# Patient Record
Sex: FEMALE | Race: WHITE | NOT HISPANIC OR LATINO | Employment: UNEMPLOYED | URBAN - METROPOLITAN AREA
[De-identification: names, ages, dates, MRNs, and addresses within clinical notes are randomized per-mention and may not be internally consistent; named-entity substitution may affect disease eponyms.]

---

## 2017-01-02 ENCOUNTER — OFFICE VISIT (OUTPATIENT)
Dept: FAMILY MEDICINE | Facility: CLINIC | Age: 57
End: 2017-01-02
Payer: COMMERCIAL

## 2017-01-02 VITALS
BODY MASS INDEX: 46.98 KG/M2 | DIASTOLIC BLOOD PRESSURE: 80 MMHG | SYSTOLIC BLOOD PRESSURE: 122 MMHG | HEART RATE: 73 BPM | OXYGEN SATURATION: 96 % | TEMPERATURE: 96.7 F | RESPIRATION RATE: 16 BRPM | HEIGHT: 65 IN | WEIGHT: 282 LBS

## 2017-01-02 DIAGNOSIS — E11.9 TYPE 2 DIABETES MELLITUS WITHOUT COMPLICATION, WITHOUT LONG-TERM CURRENT USE OF INSULIN (H): ICD-10-CM

## 2017-01-02 DIAGNOSIS — E66.01 MORBID OBESITY DUE TO EXCESS CALORIES (H): ICD-10-CM

## 2017-01-02 DIAGNOSIS — R10.13 ABDOMINAL PAIN, EPIGASTRIC: Primary | ICD-10-CM

## 2017-01-02 DIAGNOSIS — K21.9 GASTROESOPHAGEAL REFLUX DISEASE, ESOPHAGITIS PRESENCE NOT SPECIFIED: ICD-10-CM

## 2017-01-02 PROCEDURE — 99213 OFFICE O/P EST LOW 20 MIN: CPT | Performed by: NURSE PRACTITIONER

## 2017-01-02 RX ORDER — LANSOPRAZOLE 30 MG/1
CAPSULE, DELAYED RELEASE ORAL
COMMUNITY
Start: 2016-03-20 | End: 2017-01-02

## 2017-01-02 RX ORDER — POTASSIUM CHLORIDE 1500 MG/1
TABLET, EXTENDED RELEASE ORAL
COMMUNITY
Start: 2012-09-15 | End: 2017-01-18

## 2017-01-02 ASSESSMENT — PAIN SCALES - GENERAL: PAINLEVEL: MODERATE PAIN (5)

## 2017-01-02 NOTE — NURSING NOTE
"Chief Complaint   Patient presents with     Abdominal Pain     gas, bloating and burping       Initial /80 mmHg  Pulse 73  Temp(Src) 96.7  F (35.9  C) (Oral)  Resp 16  Ht 5' 5.25\" (1.657 m)  Wt 282 lb (127.914 kg)  BMI 46.59 kg/m2  SpO2 96%  LMP 02/01/2010  Breastfeeding? No Estimated body mass index is 46.59 kg/(m^2) as calculated from the following:    Height as of this encounter: 5' 5.25\" (1.657 m).    Weight as of this encounter: 282 lb (127.914 kg).  BP completed using cuff size: destin Mariscal CMA      "

## 2017-01-02 NOTE — PATIENT INSTRUCTIONS
[facility]    If you have any questions regarding to your visit please contact your care team:     Team Pink:   Clinic Hours Telephone Number   Internal Medicine:  Dr. Octavia Monroe NP       7am-7pm  Monday - Thursday   7am-5pm  Fridays  [phone]  (Appointment scheduling available 24/7)    Questions about your visit?  Team Line  [phone]   Urgent Care - [place] Gomez and [facility] - 11am-9pm Monday-Friday Saturday-Sunday- 9am-5pm   [place] - 5pm-9pm Monday-Friday Saturday-Sunday- 9am-5pm  [phone] - [place]   [phone] - [place]       What options do I have for visits at the clinic other than the traditional office visit?  To expand how we care for you, many of our providers are utilizing electronic visits (e-visits) and telephone visits, when medically appropriate, for interactions with their patients rather than a visit in the clinic.   We also offer nurse visits for many medical concerns. Just like any other service, we will bill your insurance company for this type of visit based on time spent on the phone with your provider. Not all insurance companies cover these visits. Please check with your medical insurance if this type of visit is covered. You will be responsible for any charges that are not paid by your insurance.      E-visits via [email or web address]:  generally incur a $35.00 fee.  Telephone visits:  Time spent on the phone: *charged based on time that is spent on the phone in increments of 10 minutes. Estimated cost:   5-10 mins $30.00   11-20 mins. $59.00   21-30 mins. $85.00   Use The Bakeryt (secure email communication and access to your chart) to send your primary care provider a message or make an appointment. Ask someone on your Team how to sign up for [email or web address].    For a Price Quote for your services, please call our Consumer Price Line at [phone].    As always, Thank you for trusting us with your health care  needs!    BRIT

## 2017-01-02 NOTE — PROGRESS NOTES
SUBJECTIVE:                                                    Gladys Singh is a 56 year old female who presents to clinic today for the following health issues:    Abdominal Pain      Duration: 2 months    Description (location/character/radiation): upper gastric       Associated flank pain: None    Intensity:  moderate    Accompanying signs and symptoms:        Fever/Chills: no        Gas/Bloating: YES       Nausea/vomitting: YES       Diarrhea: NO       Dysuria or Hematuria: no     History (previous similar pain/trauma/previous testing): hiatal hernia    Precipitating or alleviating factors:       Pain worse with eating/BM/urination: eating       Pain relieved by BM: no     Therapies tried and outcome: prevacid, zantac and prilosec    LMP:  not applicable     Patient denies melena, hematochezia.  Patient denies weight loss.  She has loose stools.  She has noticed some improvement with zantac, but wonders if she can take it more frequently during the day.  Patient is a vegan and mostly avoid acidic foods.  She does drink 2 cups of coffee daily.  She last had EGD 5 years ago and has a known hiatal hernia.  Patient had normal ultrasound in 10/16.    Problem list and histories reviewed & adjusted, as indicated.  Additional history: as documented    Patient Active Problem List   Diagnosis     GERD (gastroesophageal reflux disease)     Pedal edema     Major depressive disorder, recurrent episode, severe (H)     Fatigue     Hypothyroidism     Anxiety     Vegetarian     KHUSHI (obstructive sleep apnea)     Hyperparathyroidism (H)     Iron deficiency     Hyperlipidemia LDL goal <100     Papanicolaou smear of cervix with atypical squamous cells cannot exclude high grade squamous intraepithelial lesion (ASC-H)     Constipation, chronic     Hiatal hernia     Thumb pain     New daily persistent headache     Left knee pain     Morbid obesity due to excess calories (H)     Iliotibial band tendinitis of left side      Essential hypertension     Type 2 diabetes mellitus without complication, without long-term current use of insulin (H)     Past Surgical History   Procedure Laterality Date     Arthroscopy knee rt/lt       Tonsillectomy & adenoidectomy       Appendectomy       Hernia repair, umbilical       Hc repair of nasal septum       Pe tubes       Colonoscopy  , ,      early Crohn's     Hc esophagoscopy, diagnostic  , ,      benign, fungal infection     C total knee arthroplasty  2004     bilateral     C  delivery only       x 2     Hc colp cervix/upper vagina w bx cervix  ,      neg       Social History   Substance Use Topics     Smoking status: Former Smoker     Quit date: 1994     Smokeless tobacco: Never Used     Alcohol Use: No     Family History   Problem Relation Age of Onset     DIABETES Mother      CANCER Mother      endometrial     DIABETES Father      Prostate Cancer Father      DIABETES Maternal Grandmother      CEREBROVASCULAR DISEASE Paternal Grandfather      Cancer - colorectal Sister      Neurologic Disorder Daughter      anxiety     Neurologic Disorder Daughter      anxiety, bipolar     C.A.D. Father          Current Outpatient Prescriptions   Medication Sig Dispense Refill     potassium chloride SA (K-DUR/KLOR-CON M) 20 MEQ CR tablet        ranitidine (ZANTAC) 150 MG tablet Take 1 tablet (150 mg) by mouth 2 times daily 60 tablet 3     levothyroxine (SYNTHROID/LEVOTHROID) 75 MCG tablet Take 1 tablet (75 mcg) by mouth daily 90 tablet 3     vitamin D (ERGOCALCIFEROL) 42233 UNIT capsule TAKE 1 CAPSULE BY MOUTH ONCE WEEKLY. 8 capsule 3     blood glucose monitoring (NO BRAND SPECIFIED) meter device kit Use to test blood sugar one times daily or as directed. 1 kit 0     blood glucose (NO BRAND SPECIFIED) lancets standard Use to test blood sugar one times daily or as directed. 1 Box 0     blood glucose monitoring (NO BRAND SPECIFIED) test strip Use to test blood sugars  one times daily or as directed 100 strip 3     lisinopril (PRINIVIL,ZESTRIL) 10 MG tablet TAKE 1 TABLET (10 MG) BY MOUTH DAILY 90 tablet 1     docusate sodium (COLACE) 100 MG capsule TAKE 1 CAPSULE (100 MG) BY MOUTH 2 TIMES DAILY 180 capsule 1     Oyster Shell Calcium (CALCIUM CARBONATE) 500 MG tablet TAKE 1 TABLET BY MOUTH. THREE TIMES DAILY. 270 tablet 1     Ferrous Sulfate (CVS SLOW RELEASE IRON) 143 (45 FE) MG TBCR Take 1 tablet by mouth 2 times daily 180 tablet 1     furosemide (LASIX) 40 MG tablet TAKE 1 TABLET (40 MG) BY MOUTH DAILY 90 tablet 1     omeprazole (PRILOSEC) 20 MG capsule Take 1 capsule (20 mg) by mouth 2 times daily 180 capsule 0     simvastatin (ZOCOR) 20 MG tablet TAKE 1 TABLET (20 MG) BY MOUTH AT BEDTIME 90 tablet 1     cyanocobalamin (VITAMIN  B-12) 1000 MCG tablet TAKE 1 TABLET (1,000 MCG) BY MOUTH DAILY 90 tablet 1     aspirin 81 MG EC tablet TAKE 1 TABLET (81 MG) BY MOUTH DAILY 100 tablet 1     insulin pen needle (B-D U/F) 31G X 5 MM Use once daily or as directed. 100 each prn     order for DME Test strips for pt's glucometer, brand as covered by insurance. Test twice daily or PRN. 1 Box prn     Multiple Vitamins-Minerals (CENTROVITE) TABS TAKE 1 TABLET BY MOUTH DAILY 90 tablet 3     POTASSIUM CHLORIDE 20 MEQ tablet TAKE 1 TABLET (20 MEQ) BY MOUTH 2 TIMES DAILY 180 tablet 1     FLUoxetine (PROZAC) 20 MG capsule Take 40 mg by mouth daily        LORazepam (ATIVAN) 1 MG tablet Take 1 mg by mouth At Bedtime        modafinil (PROVIGIL) 200 MG tablet Take 0.5 tablets by mouth daily.       AMBIEN 10 MG OR TABS 1 TABLET DAILY AT BEDTIME       Allergies   Allergen Reactions     Augmentin GI Disturbance     Victoza Other (See Comments)     Abdominal pain     BP Readings from Last 3 Encounters:   01/02/17 122/80   12/19/16 132/82   11/21/16 130/80    Wt Readings from Last 3 Encounters:   01/02/17 282 lb (127.914 kg)   12/19/16 280 lb 3.2 oz (127.098 kg)   11/21/16 281 lb (127.461 kg)                 "  Problem list, Medication list, Allergies, and Medical/Social/Surgical histories reviewed in [facility] and updated as appropriate.    ROS:  Constitutional, HEENT, cardiovascular, pulmonary, gi and gu systems are negative, except as otherwise noted.    OBJECTIVE:                                                    /80 mmHg  Pulse 73  Temp(Src) 96.7  F (35.9  C) (Oral)  Resp 16  Ht 5' 5.25\" (1.657 m)  Wt 282 lb (127.914 kg)  BMI 46.59 kg/m2  SpO2 96%  LMP 02/01/2010  Breastfeeding? No  Body mass index is 46.59 kg/(m^2).  GENERAL: healthy, alert and no distress  RESP: lungs clear to auscultation - no rales, rhonchi or wheezes  CV: regular rate and rhythm, normal S1 S2, no S3 or S4, no murmur, click or rub, no peripheral edema and peripheral pulses strong  ABDOMEN: soft, nontender, no hepatosplenomegaly, no masses and bowel sounds normal  MS: no gross musculoskeletal defects noted, no edema    Diagnostic Test Results:  none      ASSESSMENT/PLAN:                                                      1. Abdominal pain, epigastric  Will refer to GI for further evaluation- possibly esophagitis, hiatal hernia, uncontrolled GERD.  - GASTROENTEROLOGY ADULT REFERRAL +/- PROCEDURE    2. Morbid obesity due to excess calories (H)  I suspect that weight loss will help with above issues and with diabetes as well.  Patient feels her diet is currently well controlled, but she is unable to exercise due to chronic pain.  Patient was recently taken off her meds due to potential side effects and she is concerned about her blood sugars.  They range from 130-140 fasting and patient will follow-up with endocrinology.  - [facility] PT, HAND, AND CHIROPRACTIC REFERRAL    3. Type 2 diabetes mellitus without complication, without long-term current use of insulin (H)  As above.   - [facility] PT, HAND, AND CHIROPRACTIC REFERRAL    4. Gastroesophageal reflux disease, esophagitis presence not specified  Will try switching to 150 mg BID, in addition to " omeprazole 20 mg BID to see if patient can get increased relief of symptoms.  - ranitidine (ZANTAC) 150 MG tablet; Take 1 tablet (150 mg) by mouth 2 times daily  Dispense: 60 tablet; Refill: 3  - GASTROENTEROLOGY ADULT REFERRAL +/- PROCEDURE    FUTURE APPOINTMENTS:       - Follow-up for annual visit or as needed    JACKELINE Hare CNP  [facility]

## 2017-01-02 NOTE — MR AVS SNAPSHOT
After Visit Summary   1/2/2017    Gladys Singh    MRN: [id number]           Patient Information     Date Of Birth          1960        Visit Information        Provider Department      1/2/2017 9:00 AM Vania Monroe APRN [facility]        Today's Diagnoses     Abdominal pain, epigastric    -  1     Morbid obesity due to excess calories (H)         Type 2 diabetes mellitus without complication, without long-term current use of insulin (H)         Gastroesophageal reflux disease, esophagitis presence not specified           Care Instructions    [facility]    If you have any questions regarding to your visit please contact your care team:     Team Pink:   Clinic Hours Telephone Number   Internal Medicine:  Dr. Octavia Monroe, NP       7am-7pm  Monday - Thursday   7am-5pm  Fridays  [phone]  (Appointment scheduling available 24/7)    Questions about your visit?  Team Line  [phone]   Urgent Care - Michelle Gomez and [facility] - 11am-9pm Monday-Friday Saturday-Sunday- 9am-5pm   [place] - 5pm-9pm Monday-Friday Saturday-Sunday- 9am-5pm  [phone] - Michelle   [phone] - [place]       What options do I have for visits at the clinic other than the traditional office visit?  To expand how we care for you, many of our providers are utilizing electronic visits (e-visits) and telephone visits, when medically appropriate, for interactions with their patients rather than a visit in the clinic.   We also offer nurse visits for many medical concerns. Just like any other service, we will bill your insurance company for this type of visit based on time spent on the phone with your provider. Not all insurance companies cover these visits. Please check with your medical insurance if this type of visit is covered. You will be responsible for any charges that are not paid by your insurance.       E-visits via Touch[employer]2 Technologieshart:  generally incur a $35.00 fee.  Telephone visits:  Time spent on the phone: *charged based on time that is spent on the phone in increments of 10 minutes. Estimated cost:   5-10 mins $30.00   11-20 mins. $59.00   21-30 mins. $85.00   Use Touch[employer]hart (secure email communication and access to your chart) to send your primary care provider a message or make an appointment. Ask someone on your Team how to sign up for [employer].    For a Price Quote for your services, please call our xkoto Price Line at [phone].    As always, Thank you for trusting us with your health care needs!    SERJIO/MA          Follow-ups after your visit        Additional Services     GASTROENTEROLOGY ADULT REFERRAL +/- PROCEDURE       Your provider has referred you to Gastroenterology Services.    English    Procedure/Referral: REFERRAL ONLY - [facility]: [facility] [place] [phone]   [email or web address]    Please be aware that coverage of these services is subject to the terms and limitations of your health insurance plan.  Call member services at your health plan with any benefit or coverage questions.  Any procedures must be performed at a [place] facility OR coordinated by your clinic's referral office.    Please bring the following with you to your appointment:    (1) Any X-Rays, CTs or MRIs which have been performed.  Contact the facility where they were done to arrange for  prior to your scheduled appointment.    (2) List of current medications   (3) This referral request   (4) Any documents/labs given to you for this referral            [facility] PT, HAND, AND CHIROPRACTIC REFERRAL       **This order will print in the [facility] Scheduling Office**    Physical Therapy, Hand Therapy and Chiropractic Care are available through:    *[facility]  *[facility]  *[facility]    Call one number to schedule at any of the above locations: [phone].    Your provider has referred you to: Physical Therapy at [facility] or [facility]    Indication/Reason for Referral: Morbid obesity, chronic pain, diabetes  Onset of Illness:   Therapy Orders: Evaluate and Treat  Special Programs: Diabetes Focus Exercise and None  Special Request: None    Dario Bishop      Additional Comments for the Therapist or Chiropractor:     Please be aware that coverage of these services is subject to the terms and limitations of your health insurance plan.  Call member services at your health plan with any benefit or coverage questions.      Please bring the following to your appointment:    *Your personal calendar for scheduling future appointments  *Comfortable clothing                  Your next 10 appointments already scheduled     Jun 19, 2017 10:50 AM   Return Visit with Roseline Fajardo MD, [facility] ENDO NURSE   [facility]    [id number] [place] [id number] [phone]              Who to contact     If you have questions or need follow up information about today's clinic visit or your schedule please contact [facility] directly at [phone].  Normal or non-critical lab and imaging results will be communicated to you by [employer]op[employer]art, letter or phone within 4 business days after the clinic has received the results. If you do not hear from us within 7 days, please contact the clinic through [employer] or phone. If you have a critical or abnormal lab result, we will notify you by phone as soon as possible.  Submit refill requests through Intentiva or call your pharmacy and they will forward the refill request to us. Please allow 3 business days for your refill to be completed.          Additional Information About Your Visit        [employer]op[employer]ar[employer]ival[employer] Information     [employer] gives you secure access to your electronic health record. If you see a primary care provider, you can also send messages to your care team and make  "appointments. If you have questions, please call your primary care clinic.  If you do not have a primary care provider, please call [phone] and they will assist you.        Your Vitals Were     Pulse Temperature Respirations    73 96.7  F (35.9  C) (Oral) 16    Height BMI (Body Mass Index) Pulse Oximetry    5' 5.25\" (1.657 m) 46.59 kg/m2 96%    Last Period Breastfeeding?       02/01/2010 No        Blood Pressure from Last 3 Encounters:   01/02/17 122/80   12/19/16 132/82   11/21/16 130/80    Weight from Last 3 Encounters:   01/02/17 282 lb (127.914 kg)   12/19/16 280 lb 3.2 oz (127.098 kg)   11/21/16 281 lb (127.461 kg)              We Performed the Following     GASTROENTEROLOGY ADULT REFERRAL +/- PROCEDURE     [facility] PT, HAND, AND CHIROPRACTIC REFERRAL          Today's Medication Changes          These changes are accurate as of: 1/2/17  9:41 AM.  If you have any questions, ask your nurse or doctor.               These medicines have changed or have updated prescriptions.        Dose/Directions    ranitidine 150 MG tablet   Commonly known as:  ZANTAC   This may have changed:    - medication strength  - how much to take  - when to take this   Used for:  Gastroesophageal reflux disease, esophagitis presence not specified   Changed by:  Vania Monroe APRN CNP        Dose:  150 mg   Take 1 tablet (150 mg) by mouth 2 times daily   Quantity:  60 tablet   Refills:  3            Where to get your medicines      These medications were sent to [facility]/pharmacy #[id number] - [place] - [address] [address]     Phone:  [phone]    - ranitidine 150 MG tablet             Primary Care Provider Office Phone # Fax #    JACKELINE [facility] [phone]       [address] [address]        Thank you!     Thank you for choosing [facility]  for your care. Our goal is always to provide you with " excellent care. Hearing back from our patients is one way we can continue to improve our services. Please take a few minutes to complete the written survey that you may receive in the mail after your visit with us. Thank you!             Your Updated Medication List - Protect others around you: Learn how to safely use, store and throw away your medicines at www.disposemymeds.org.          This list is accurate as of: 1/2/17  9:41 AM.  Always use your most recent med list.                   Brand Name Dispense Instructions for use    AMBIEN 10 MG tablet   Generic drug:  zolpidem      1 TABLET DAILY AT BEDTIME       aspirin 81 MG EC tablet     100 tablet    TAKE 1 TABLET (81 MG) BY MOUTH DAILY       blood glucose lancets standard    no brand specified    1 Box    Use to test blood sugar one times daily or as directed.       blood glucose monitoring meter device kit    no brand specified    1 kit    Use to test blood sugar one times daily or as directed.       blood glucose monitoring test strip    no brand specified    100 strip    Use to test blood sugars one times daily or as directed       CENTROVITE Tabs     90 tablet    TAKE 1 TABLET BY MOUTH DAILY       cyanocobalamin 1000 MCG tablet    vitamin  B-12    90 tablet    TAKE 1 TABLET (1,000 MCG) BY MOUTH DAILY       docusate sodium 100 MG capsule    COLACE    180 capsule    TAKE 1 CAPSULE (100 MG) BY MOUTH 2 TIMES DAILY       Ferrous Sulfate 143 (45 FE) MG Tbcr    CVS SLOW RELEASE IRON    180 tablet    Take 1 tablet by mouth 2 times daily       furosemide 40 MG tablet    LASIX    90 tablet    TAKE 1 TABLET (40 MG) BY MOUTH DAILY       insulin pen needle 31G X 5 MM    B-D U/F    100 each    Use once daily or as directed.       levothyroxine 75 MCG tablet    SYNTHROID/LEVOTHROID    90 tablet    Take 1 tablet (75 mcg) by mouth daily       lisinopril 10 MG tablet    PRINIVIL/ZESTRIL    90 tablet    TAKE 1 TABLET (10 MG) BY MOUTH DAILY       LORazepam 1 MG tablet     ATIVAN     Take 1 mg by mouth At Bedtime       omeprazole 20 MG CR capsule    priLOSEC    180 capsule    Take 1 capsule (20 mg) by mouth 2 times daily       order for DME     1 Box    Test strips for pt's glucometer, brand as covered by insurance. Test twice daily or PRN.       Oyster Shell Calcium 500 MG tablet    calcium carbonate    270 tablet    TAKE 1 TABLET BY MOUTH. THREE TIMES DAILY.       * potassium chloride SA 20 MEQ CR tablet    K-DUR/KLOR-CON M         * potassium chloride 20 MEQ CR tablet   Generic drug:  potassium chloride SA     180 tablet    TAKE 1 TABLET (20 MEQ) BY MOUTH 2 TIMES DAILY       PROVIGIL 200 MG tablet   Generic drug:  modafinil      Take 0.5 tablets by mouth daily.       PROZAC 20 MG capsule   Generic drug:  FLUoxetine      Take 40 mg by mouth daily       ranitidine 150 MG tablet    ZANTAC    60 tablet    Take 1 tablet (150 mg) by mouth 2 times daily       simvastatin 20 MG tablet    ZOCOR    90 tablet    TAKE 1 TABLET (20 MG) BY MOUTH AT BEDTIME       vitamin D 53327 UNIT capsule    ERGOCALCIFEROL    8 capsule    TAKE 1 CAPSULE BY MOUTH ONCE WEEKLY.       * Notice:  This list has 2 medication(s) that are the same as other medications prescribed for you. Read the directions carefully, and ask your doctor or other care provider to review them with you.

## 2017-01-03 ENCOUNTER — TELEPHONE (OUTPATIENT)
Dept: ENDOCRINOLOGY | Facility: CLINIC | Age: 57
End: 2017-01-03

## 2017-01-03 NOTE — TELEPHONE ENCOUNTER
Pt faxed in bg log sheet on Monday. I received it after Dr Fajardo had left for the day. Called pt on Tues. Let her know we received bg log sheet but will not be able to give to Dr Fajardo until next Mon, when he is due back in clinic. Bg readings only slightlylty elevated with several in target range. Is she okay with that? Pt verbalized that would be fine. Advised once bg log is reviewed by Dr Fajardo, I will call her with is recomendations.Pt verbalized understading.     Elizabeth Lujan, RN, BSN, CDE   [facility]

## 2017-01-10 ENCOUNTER — TELEPHONE (OUTPATIENT)
Dept: ENDOCRINOLOGY | Facility: CLINIC | Age: 57
End: 2017-01-10

## 2017-01-10 NOTE — TELEPHONE ENCOUNTER
Left vm advising pt, bg log sheet ws reviewed by Dr Fajardo. Per his recommendatioin: okay to remain off all diabetes medication. Please send in more readings in 2 months. Reminded of appt with Dr Fajardo in June, 2017.     Elizabeth Lujan, RN, BSN, CDE   [facility]

## 2017-01-16 DIAGNOSIS — R35.89 DIURESIS: Primary | ICD-10-CM

## 2017-01-16 NOTE — TELEPHONE ENCOUNTER
Potassium       Last Written Prescription Date: 3/4/16  Last Fill Quantity: 180, # refills: 1  Last Office Visit with [facility] or [facility] prescribing provider: 01/02/2017       POTASSIUM   Date Value Ref Range Status   11/21/2016 3.5 3.4 - 5.3 mmol/L Final     CREATININE   Date Value Ref Range Status   11/21/2016 0.81 0.52 - 1.04 mg/dL Final     BP Readings from Last 3 Encounters:   01/02/17 122/80   12/19/16 132/82   11/21/16 130/80

## 2017-01-18 RX ORDER — POTASSIUM CHLORIDE 1500 MG/1
TABLET, EXTENDED RELEASE ORAL
Qty: 180 TABLET | Refills: 0 | Status: SHIPPED | OUTPATIENT
Start: 2017-01-18 | End: 2017-04-15

## 2017-01-18 NOTE — TELEPHONE ENCOUNTER
Prescription approved per [facility] Refill Protocol.  Re Resendiz RN

## 2017-01-19 DIAGNOSIS — K44.9 HIATAL HERNIA: Primary | ICD-10-CM

## 2017-01-19 NOTE — TELEPHONE ENCOUNTER
omeprazole (PRILOSEC) 20 MG capsule     Last Written Prescription Date: 10-27-16  Last Fill Quantity: 180,  # refills: 0   Last Office Visit with [facility], [facility] or [facility] prescribing provider: 1-2-17

## 2017-01-19 NOTE — TELEPHONE ENCOUNTER
Reason for call: Medication   If this is a refill request, has the caller requested the refill from the pharmacy already? Yes  Will the patient be using a [facility]? No  Name of the pharmacy and phone number for the current request: [facility]/PHARMACY #[id number] - [place], [place] - [address]    Name of the medication requested: Omeprazole 20 mg capsule; 1 by mouth 2x daily; qty 180    Other request: send electroniclally    Phone Number Pt can be reached at: Other phone number:  [phone]  Best Time: anytime  Can we leave a detailed message on this number? YES

## 2017-01-20 ENCOUNTER — TRANSFERRED RECORDS (OUTPATIENT)
Dept: HEALTH INFORMATION MANAGEMENT | Facility: CLINIC | Age: 57
End: 2017-01-20

## 2017-01-30 ENCOUNTER — OFFICE VISIT (OUTPATIENT)
Dept: FAMILY MEDICINE | Facility: CLINIC | Age: 57
End: 2017-01-30
Payer: COMMERCIAL

## 2017-01-30 VITALS
TEMPERATURE: 97.8 F | BODY MASS INDEX: 45.89 KG/M2 | HEART RATE: 65 BPM | DIASTOLIC BLOOD PRESSURE: 80 MMHG | WEIGHT: 277.8 LBS | SYSTOLIC BLOOD PRESSURE: 141 MMHG | OXYGEN SATURATION: 95 %

## 2017-01-30 DIAGNOSIS — J01.90 ACUTE SINUSITIS WITH SYMPTOMS > 10 DAYS: Primary | ICD-10-CM

## 2017-01-30 PROCEDURE — 99213 OFFICE O/P EST LOW 20 MIN: CPT | Performed by: PHYSICIAN ASSISTANT

## 2017-01-30 RX ORDER — DOXYCYCLINE 100 MG/1
100 CAPSULE ORAL 2 TIMES DAILY
Qty: 20 CAPSULE | Refills: 0 | Status: SHIPPED | OUTPATIENT
Start: 2017-01-30 | End: 2017-02-09

## 2017-01-30 NOTE — PATIENT INSTRUCTIONS
Doxycycline 100 mg twice a day for 10 days   Increase fluids  Nasal wash  Mucinex DM  F/u if not better after the antibiotic course.       Sinusitis (Antibiotic Treatment)    The sinuses are air-filled spaces within the bones of the face. They connect to the inside of the nose. Sinusitis is an inflammation of the tissue lining the sinus cavity. Sinus inflammation can occur during a cold. It can also be due to allergies to pollens and other particles in the air. Sinusitis can cause symptoms of sinus congestion and fullness. A sinus infection causes fever, headache and facial pain. There is often green or yellow drainage from the nose or into the back of the throat (post-nasal drip). You have been given antibiotics to treat this condition.  Home care:    Take the full course of antibiotics as instructed. Do not stop taking them, even if you feel better.    Drink plenty of water, hot tea, and other liquids. This may help thin mucus. It also may promote sinus drainage.    Heat may help soothe painful areas of the face. Use a towel soaked in hot water. Or,  the shower and direct the hot spray onto your face. Using a vaporizer along with a menthol rub at night may also help.     An expectorant containing guaifenesin may help thin the mucus and promote drainage from the sinuses.    Over-the-counter decongestants may be used unless a similar medicine was prescribed. Nasal sprays work the fastest. Use one that contains phenylephrine or oxymetazoline. First blow the nose gently. Then use the spray. Do not use these medicines more often than directed on the label or symptoms may get worse. You may also use tablets containing pseudoephedrine. Avoid products that combine ingredients, because side effects may be increased. Read labels. You can also ask the pharmacist for help. (NOTE: Persons with high blood pressure should not use decongestants. They can raise blood pressure.)    Over-the-counter antihistamines may help  if allergies contributed to your sinusitis.      Do not use nasal rinses or irrigation during an acute sinus infection, unless told to by your health care provider. Rinsing may spread the infection to other sinuses.    Use acetaminophen or ibuprofen to control pain, unless another pain medicine was prescribed. (If you have chronic liver or kidney disease or ever had a stomach ulcer, talk with your doctor before using these medicines. Aspirin should never be used in anyone under 18 years of age who is ill with a fever. It may cause severe liver damage.)    Don't smoke. This can worsen symptoms.  Follow-up care  Follow up with your healthcare provider or our staff if you are not improving within the next week.  When to seek medical advice  Call your healthcare provider if any of these occur:    Facial pain or headache becoming more severe    Stiff neck    Unusual drowsiness or confusion    Swelling of the forehead or eyelids    Vision problems, including blurred or double vision    Fever of 100.4 F (38 C) or higher, or as directed by your healthcare provider    Seizure    Breathing problems    Symptoms not resolving within 10 days    0929-0425 [employer]. [address] [place] [address], [place] [address]. All rights reserved. This information is not intended as a substitute for professional medical care. Always follow your healthcare professional's instructions.

## 2017-01-30 NOTE — NURSING NOTE
"Chief Complaint   Patient presents with     URI       Initial /80 mmHg  Pulse 65  Temp(Src) 97.8  F (36.6  C) (Oral)  Wt 277 lb 12.8 oz (126.009 kg)  SpO2 95%  LMP 02/01/2010 Estimated body mass index is 45.89 kg/(m^2) as calculated from the following:    Height as of 1/2/17: 5' 5.25\" (1.657 m).    Weight as of this encounter: 277 lb 12.8 oz (126.009 kg).  BP completed using cuff size: liyah Bazan, CMA      "

## 2017-01-30 NOTE — MR AVS SNAPSHOT
After Visit Summary   1/30/2017    Gladys Singh    MRN: [id number]           Patient Information     Date Of Birth          1960        Visit Information        Provider Department      1/30/2017 4:00 PM [facility]        Today's Diagnoses     Acute sinusitis with symptoms > 10 days    -  1       Care Instructions    Doxycycline 100 mg twice a day for 10 days   Increase fluids  Nasal wash  Mucinex DM  F/u if not better after the antibiotic course.       Sinusitis (Antibiotic Treatment)    The sinuses are air-filled spaces within the bones of the face. They connect to the inside of the nose. Sinusitis is an inflammation of the tissue lining the sinus cavity. Sinus inflammation can occur during a cold. It can also be due to allergies to pollens and other particles in the air. Sinusitis can cause symptoms of sinus congestion and fullness. A sinus infection causes fever, headache and facial pain. There is often green or yellow drainage from the nose or into the back of the throat (post-nasal drip). You have been given antibiotics to treat this condition.  Home care:    Take the full course of antibiotics as instructed. Do not stop taking them, even if you feel better.    Drink plenty of water, hot tea, and other liquids. This may help thin mucus. It also may promote sinus drainage.    Heat may help soothe painful areas of the face. Use a towel soaked in hot water. Or,  the shower and direct the hot spray onto your face. Using a vaporizer along with a menthol rub at night may also help.     An expectorant containing guaifenesin may help thin the mucus and promote drainage from the sinuses.    Over-the-counter decongestants may be used unless a similar medicine was prescribed. Nasal sprays work the fastest. Use one that contains phenylephrine or oxymetazoline. First blow the nose gently. Then use the spray. Do not use these  medicines more often than directed on the label or symptoms may get worse. You may also use tablets containing pseudoephedrine. Avoid products that combine ingredients, because side effects may be increased. Read labels. You can also ask the pharmacist for help. (NOTE: Persons with high blood pressure should not use decongestants. They can raise blood pressure.)    Over-the-counter antihistamines may help if allergies contributed to your sinusitis.      Do not use nasal rinses or irrigation during an acute sinus infection, unless told to by your health care provider. Rinsing may spread the infection to other sinuses.    Use acetaminophen or ibuprofen to control pain, unless another pain medicine was prescribed. (If you have chronic liver or kidney disease or ever had a stomach ulcer, talk with your doctor before using these medicines. Aspirin should never be used in anyone under 18 years of age who is ill with a fever. It may cause severe liver damage.)    Don't smoke. This can worsen symptoms.  Follow-up care  Follow up with your healthcare provider or our staff if you are not improving within the next week.  When to seek medical advice  Call your healthcare provider if any of these occur:    Facial pain or headache becoming more severe    Stiff neck    Unusual drowsiness or confusion    Swelling of the forehead or eyelids    Vision problems, including blurred or double vision    Fever of 100.4 F (38 C) or higher, or as directed by your healthcare provider    Seizure    Breathing problems    Symptoms not resolving within 10 days    3232-1526 [employer]. [address] [place] [address]. All rights reserved. This information is not intended as a substitute for professional medical care. Always follow your healthcare professional's instructions.              Follow-ups after your visit        Your next 10 appointments already scheduled     Jun 19, 2017 10:50 AM   Return Visit with Roseline Padilla  MD Scotty, MG ENDO NURSE   [facility]    [address] [address] [place] [id number] [phone]              Who to contact     If you have questions or need follow up information about today's clinic visit or your schedule please contact [facility] directly at [phone].  Normal or non-critical lab and imaging results will be communicated to you by MyChart, letter or phone within 4 business days after the clinic has received the results. If you do not hear from us within 7 days, please contact the clinic through [employer] or phone. If you have a critical or abnormal lab result, we will notify you by phone as soon as possible.  Submit refill requests through [employer] or call your pharmacy and they will forward the refill request to us. Please allow 3 business days for your refill to be completed.          Additional Information About Your Visit        [employer] Information     [employer] gives you secure access to your electronic health record. If you see a primary care provider, you can also send messages to your care team and make appointments. If you have questions, please call your primary care clinic.  If you do not have a primary care provider, please call [phone] and they will assist you.        Care EveryWhere ID     This is your Care EveryWhere ID. This could be used by other organizations to access your [facility] medical records  [id number]        Your Vitals Were     Pulse Temperature Pulse Oximetry Last Period          65 97.8  F (36.6  C) (Oral) 95% 02/01/2010         Blood Pressure from Last 3 Encounters:   01/30/17 141/80   01/02/17 122/80   12/19/16 132/82    Weight from Last 3 Encounters:   01/30/17 277 lb 12.8 oz (126.009 kg)   01/02/17 282 lb (127.914 kg)   12/19/16 280 lb 3.2 oz (127.098 kg)              Today, you had the following     No orders found for display         Today's Medication Changes          These  changes are accurate as of: 1/30/17  4:10 PM.  If you have any questions, ask your nurse or doctor.               Start taking these medicines.        Dose/Directions    doxycycline Monohydrate 100 MG Caps   Used for:  Acute sinusitis with symptoms > 10 days   Started by:  Jahaira Aviles PA-C        Dose:  100 mg   Take 1 capsule (100 mg) by mouth 2 times daily for 10 days   Quantity:  20 capsule   Refills:  0            Where to get your medicines      These medications were sent to [facility]/pharmacy #[id number] - [place] - [address] [facility]  [address] [place], [place] [address] [address]     Phone:  [phone]    - doxycycline Monohydrate 100 MG Caps             Primary Care Provider Office Phone # Fax #    Vania JACKELINE [facility] [phone]       [facility] [address] [facility] [address]        Thank you!     Thank you for choosing [facility]  for your care. Our goal is always to provide you with excellent care. Hearing back from our patients is one way we can continue to improve our services. Please take a few minutes to complete the written survey that you may receive in the mail after your visit with us. Thank you!             Your Updated Medication List - Protect others around you: Learn how to safely use, store and throw away your medicines at www.disposemymeds.org.          This list is accurate as of: 1/30/17  4:10 PM.  Always use your most recent med list.                   Brand Name Dispense Instructions for use    AMBIEN 10 MG tablet   Generic drug:  zolpidem      1 TABLET DAILY AT BEDTIME       aspirin 81 MG EC tablet     100 tablet    TAKE 1 TABLET (81 MG) BY MOUTH DAILY       blood glucose lancets standard    no brand specified    1 Box    Use to test blood sugar one times daily or as directed.       blood glucose monitoring meter device kit    no brand specified    1 kit    Use to test blood sugar one times daily or  as directed.       blood glucose monitoring test strip    no brand specified    100 strip    Use to test blood sugars one times daily or as directed       CENTROVITE Tabs     90 tablet    TAKE 1 TABLET BY MOUTH DAILY       cyanocobalamin 1000 MCG tablet    vitamin  B-12    90 tablet    TAKE 1 TABLET (1,000 MCG) BY MOUTH DAILY       docusate sodium 100 MG capsule    COLACE    180 capsule    TAKE 1 CAPSULE (100 MG) BY MOUTH 2 TIMES DAILY       doxycycline Monohydrate 100 MG Caps     20 capsule    Take 1 capsule (100 mg) by mouth 2 times daily for 10 days       Ferrous Sulfate 143 (45 FE) MG Tbcr    [facility] SLOW RELEASE IRON    180 tablet    Take 1 tablet by mouth 2 times daily       furosemide 40 MG tablet    LASIX    90 tablet    TAKE 1 TABLET (40 MG) BY MOUTH DAILY       insulin pen needle 31G X 5 MM    B-D U/F    100 each    Use once daily or as directed.       levothyroxine 75 MCG tablet    SYNTHROID/LEVOTHROID    90 tablet    Take 1 tablet (75 mcg) by mouth daily       lisinopril 10 MG tablet    PRINIVIL/ZESTRIL    90 tablet    TAKE 1 TABLET (10 MG) BY MOUTH DAILY       LORazepam 1 MG tablet    ATIVAN     Take 1 mg by mouth At Bedtime       omeprazole 20 MG CR capsule    priLOSEC    180 capsule    Take 1 capsule (20 mg) by mouth 2 times daily       order for DME     1 Box    Test strips for pt's glucometer, brand as covered by insurance. Test twice daily or PRN.       Oyster Shell Calcium 500 MG tablet    calcium carbonate    270 tablet    TAKE 1 TABLET BY MOUTH. THREE TIMES DAILY.       potassium chloride 20 MEQ CR tablet   Generic drug:  potassium chloride SA     180 tablet    TAKE 1 TABLET (20 MEQ) BY MOUTH 2 TIMES DAILY       PROVIGIL 200 MG tablet   Generic drug:  modafinil      Take 0.5 tablets by mouth daily.       PROZAC 20 MG capsule   Generic drug:  FLUoxetine      Take 30 mg by mouth daily       ranitidine 150 MG tablet    ZANTAC    60 tablet    Take 1 tablet (150 mg) by mouth 2 times daily        simvastatin 20 MG tablet    ZOCOR    90 tablet    TAKE 1 TABLET (20 MG) BY MOUTH AT BEDTIME       vitamin D 00370 UNIT capsule    ERGOCALCIFEROL    8 capsule    TAKE 1 CAPSULE BY MOUTH ONCE WEEKLY.

## 2017-01-30 NOTE — PROGRESS NOTES
SUBJECTIVE:                                                    Gladys Singh is a 56 year old female who presents to clinic today for the following health issues:    Acute Illness   Acute illness concerns: Chest pressure, sinus press, cough   Onset: x9-11 days      Fever: no     Chills/Sweats: YES- both     Headache (location?): YES    Sinus Pressure:YES    Conjunctivitis:  no    Ear Pain: YES: left    Rhinorrhea: YES    Congestion: YES    Sore Throat: PT states she feels like there is something stuck in her throat. Pt states she has an appt with GI on 2/8/2017   Cough: YES-productive of clear sputum    Wheeze: YES    Decreased Appetite: YES    Nausea: no     Vomiting: no     Diarrhea:  YES- 1/27/2017    Dysuria/Freq.: no     Fatigue/Achiness: YES- fatigue     Sick/Strep Exposure: no      Therapies Tried and outcome: tylenol last taken at 1pm     Problem list and histories reviewed & adjusted, as indicated.  Additional history: as documented    Patient Active Problem List   Diagnosis     GERD (gastroesophageal reflux disease)     Pedal edema     Major depressive disorder, recurrent episode, severe (H)     Fatigue     Hypothyroidism     Anxiety     Vegetarian     KHUSHI (obstructive sleep apnea)     Hyperparathyroidism (H)     Iron deficiency     Hyperlipidemia LDL goal <100     Papanicolaou smear of cervix with atypical squamous cells cannot exclude high grade squamous intraepithelial lesion (ASC-H)     Constipation, chronic     Hiatal hernia     Thumb pain     New daily persistent headache     Left knee pain     Morbid obesity due to excess calories (H)     Iliotibial band tendinitis of left side     Essential hypertension     Type 2 diabetes mellitus without complication, without long-term current use of insulin (H)     Past Surgical History   Procedure Laterality Date     Arthroscopy knee rt/lt       Tonsillectomy & adenoidectomy       Appendectomy       Hernia repair, umbilical       Hc repair of nasal  septum       Pe tubes       Colonoscopy  , ,      early Crohn's     Hc esophagoscopy, diagnostic  , ,      benign, fungal infection     C total knee arthroplasty  2004     bilateral     C  delivery only       x 2     Hc colp cervix/upper vagina w bx cervix  ,      neg       Social History   Substance Use Topics     Smoking status: Former Smoker     Quit date: 1994     Smokeless tobacco: Never Used     Alcohol Use: No     Family History   Problem Relation Age of Onset     DIABETES Mother      CANCER Mother      endometrial     DIABETES Father      Prostate Cancer Father      DIABETES Maternal Grandmother      CEREBROVASCULAR DISEASE Paternal Grandfather      Cancer - colorectal Sister      Neurologic Disorder Daughter      anxiety     Neurologic Disorder Daughter      anxiety, bipolar     C.A.D. Father          Current Outpatient Prescriptions   Medication Sig Dispense Refill     doxycycline Monohydrate 100 MG CAPS Take 1 capsule (100 mg) by mouth 2 times daily for 10 days 20 capsule 0     omeprazole (PRILOSEC) 20 MG CR capsule Take 1 capsule (20 mg) by mouth 2 times daily 180 capsule 3     POTASSIUM CHLORIDE 20 MEQ CR tablet TAKE 1 TABLET (20 MEQ) BY MOUTH 2 TIMES DAILY 180 tablet 0     ranitidine (ZANTAC) 150 MG tablet Take 1 tablet (150 mg) by mouth 2 times daily 60 tablet 3     levothyroxine (SYNTHROID/LEVOTHROID) 75 MCG tablet Take 1 tablet (75 mcg) by mouth daily 90 tablet 3     vitamin D (ERGOCALCIFEROL) 23843 UNIT capsule TAKE 1 CAPSULE BY MOUTH ONCE WEEKLY. 8 capsule 3     blood glucose monitoring (NO BRAND SPECIFIED) meter device kit Use to test blood sugar one times daily or as directed. 1 kit 0     blood glucose (NO BRAND SPECIFIED) lancets standard Use to test blood sugar one times daily or as directed. 1 Box 0     blood glucose monitoring (NO BRAND SPECIFIED) test strip Use to test blood sugars one times daily or as directed 100 strip 3     lisinopril  (PRINIVIL,ZESTRIL) 10 MG tablet TAKE 1 TABLET (10 MG) BY MOUTH DAILY 90 tablet 1     docusate sodium (COLACE) 100 MG capsule TAKE 1 CAPSULE (100 MG) BY MOUTH 2 TIMES DAILY 180 capsule 1     Oyster Shell Calcium (CALCIUM CARBONATE) 500 MG tablet TAKE 1 TABLET BY MOUTH. THREE TIMES DAILY. 270 tablet 1     Ferrous Sulfate (CVS SLOW RELEASE IRON) 143 (45 FE) MG TBCR Take 1 tablet by mouth 2 times daily 180 tablet 1     furosemide (LASIX) 40 MG tablet TAKE 1 TABLET (40 MG) BY MOUTH DAILY 90 tablet 1     simvastatin (ZOCOR) 20 MG tablet TAKE 1 TABLET (20 MG) BY MOUTH AT BEDTIME 90 tablet 1     cyanocobalamin (VITAMIN  B-12) 1000 MCG tablet TAKE 1 TABLET (1,000 MCG) BY MOUTH DAILY 90 tablet 1     aspirin 81 MG EC tablet TAKE 1 TABLET (81 MG) BY MOUTH DAILY 100 tablet 1     insulin pen needle (B-D U/F) 31G X 5 MM Use once daily or as directed. 100 each prn     order for DME Test strips for pt's glucometer, brand as covered by insurance. Test twice daily or PRN. 1 Box prn     Multiple Vitamins-Minerals (CENTROVITE) TABS TAKE 1 TABLET BY MOUTH DAILY 90 tablet 3     FLUoxetine (PROZAC) 20 MG capsule Take 30 mg by mouth daily        LORazepam (ATIVAN) 1 MG tablet Take 1 mg by mouth At Bedtime        modafinil (PROVIGIL) 200 MG tablet Take 0.5 tablets by mouth daily.       AMBIEN 10 MG OR TABS 1 TABLET DAILY AT BEDTIME       Problem list, Medication list, Allergies, and Medical/Social/Surgical histories reviewed in [facility] and updated as appropriate.    ROS:  Constitutional, HEENT, cardiovascular, pulmonary, gi and gu systems are negative, except as otherwise noted.    OBJECTIVE:                                                    /80 mmHg  Pulse 65  Temp(Src) 97.8  F (36.6  C) (Oral)  Wt 277 lb 12.8 oz (126.009 kg)  SpO2 95%  LMP 02/01/2010  Body mass index is 45.89 kg/(m^2).  GENERAL: healthy, alert and no distress  EYES: Eyes grossly normal to inspection, PERRL and conjunctivae and sclerae normal  HENT: normal  cephalic/atraumatic, ear canals and TM's normal, nasal mucosa edematous , rhinorrhea purulent, oral mucous membranes moist,  but thick postanal drainage is present and sinuses: maxillary tenderness on bilaterally  NECK: no adenopathy, no asymmetry, masses, or scars and thyroid normal to palpation  RESP: lungs clear to auscultation - no rales, rhonchi or wheezes  CV: regular rate and rhythm, normal S1 S2, no S3 or S4, no murmur, click or rub, no peripheral edema and peripheral pulses strong  ABDOMEN: soft, nontender, no hepatosplenomegaly, no masses and bowel sounds normal  MS: no gross musculoskeletal defects noted, no edema    Diagnostic Test Results:  none      ASSESSMENT/PLAN:                                                        ICD-10-CM    1. Acute sinusitis with symptoms > 10 days J01.90 doxycycline Monohydrate 100 MG CAPS     Doxycycline 100 mg twice a day for 10 days   Increase fluids  Nasal wash  Mucinex DM  F/u if not better after the antibiotic course.       Jahaira Aviles PA-C  [facility]

## 2017-02-08 ENCOUNTER — TRANSFERRED RECORDS (OUTPATIENT)
Dept: HEALTH INFORMATION MANAGEMENT | Facility: CLINIC | Age: 57
End: 2017-02-08

## 2017-02-09 ENCOUNTER — TELEPHONE (OUTPATIENT)
Dept: FAMILY MEDICINE | Facility: CLINIC | Age: 57
End: 2017-02-09

## 2017-02-09 DIAGNOSIS — E11.9 TYPE 2 DIABETES MELLITUS WITHOUT COMPLICATION, WITHOUT LONG-TERM CURRENT USE OF INSULIN (H): Primary | ICD-10-CM

## 2017-02-09 NOTE — TELEPHONE ENCOUNTER
Per pharmacy, insurance now covers One Touch Ultra. Please send new Rx for test strips, meters, and lancets.  Fariba THOMPSON CMA ([facility])

## 2017-02-10 ENCOUNTER — OFFICE VISIT (OUTPATIENT)
Dept: URGENT CARE | Facility: URGENT CARE | Age: 57
End: 2017-02-10
Payer: COMMERCIAL

## 2017-02-10 VITALS
HEART RATE: 67 BPM | OXYGEN SATURATION: 95 % | TEMPERATURE: 98.3 F | DIASTOLIC BLOOD PRESSURE: 86 MMHG | BODY MASS INDEX: 45.43 KG/M2 | SYSTOLIC BLOOD PRESSURE: 132 MMHG | WEIGHT: 275 LBS

## 2017-02-10 DIAGNOSIS — J34.89 SINUS PRESSURE: Primary | ICD-10-CM

## 2017-02-10 PROCEDURE — 99213 OFFICE O/P EST LOW 20 MIN: CPT | Performed by: INTERNAL MEDICINE

## 2017-02-10 RX ORDER — AZITHROMYCIN 250 MG/1
TABLET, FILM COATED ORAL
Qty: 6 TABLET | Refills: 1 | Status: SHIPPED | OUTPATIENT
Start: 2017-02-10 | End: 2017-08-18

## 2017-02-10 NOTE — PROGRESS NOTES
SUBJECTIVE:                                                    Gladys Singh is a 56 year old female who presents to clinic today for the following health issues:      ED/UC Followup:    Facility:   [facility]  Date of visit: 1/30/2017  Reason for visit: Sinusitis  Current Status: Pt has finished her doxycyline. Pt states that she completed her ABX on Wednesday evening and that her sx seem to be returning.

## 2017-02-10 NOTE — PROGRESS NOTES
[facility]         Truman Zhu MD, MPH  02/10/2017        History:      Gladys Singh is a pleasant 56 year old year old female with a chief complaint of  Nasal congestion,facial and sinus pressure and pain since 4 weeks ago.   No fever or chills.   No dyspnea or chest pain.   No smoking history.   No headache or neck pain.  No GI or  symptoms.   No MSK symptoms.         Assessment and Plan:        Acute sinusitis:  - azithromycin (ZITHROMAX) 250 MG tablet; Two tablets first day, then one tablet daily for four days.  Dispense: 6 tablet; Refill: 1  Discussed supportive care with the patient  Advised to increase fluid intake and rest.  Tylenol for pain q 6 hours prn  F/u w PCP in 1 week, earlier if symptoms worsen.                   Physical Exam:      /86 mmHg  Pulse 67  Temp(Src) 98.3  F (36.8  C) (Oral)  Wt 275 lb (124.739 kg)  SpO2 95%  LMP 02/01/2010  Breastfeeding? No     Constitutional: Patient is in no distress The patient is pleasant and cooperative.   HEENT: Head:  Head is atraumatic, normocephalic.    Eyes: Pupils are equal, round and reactive to light and accomodation.  Sclera is non-icteric. No conjunctival injection, or exudate noted. Extraocular motion is intact. Visual acuity is intact bilaterally.  Ears:  External acoustic canals are patent and clear.  There is no erythema and bulging( exudate)  of the ( R/L ) tympanic membrane(s ).   Nose:  Nasal congestion w/o drainage or mucosal ulceration is noted. The patient experiences pain upon gentle percussion of malar and maxillary prominences causes . No pain upon palpation of mastoid processes.  Throat:  Oral mucosa is moist.  No oral lesions are noted.  No posterior pharyngeal hyperemia nor exudate noted.     Neck Supple.  There is no cervical lymphadenopathy.  No nuchal rigidity noted.  There is no meningismus.     Cardiovascular: Heart is regular to rate and rhythm.  No murmur is noted.     Lungs:  Clear in the anterior and posterior pulmonary fields.   Abdomen: Soft and non-tender.    Back No flank tenderness is noted.   Extremeties No edema, no calf tenderness.   Neuro: No focal deficit.   Skin No petechiae or purpura is noted.  There is no rash.   Mood Normal              Data:      All new lab and imaging data was reviewed.   Results for orders placed or performed in visit on 12/19/16   Tissue transglutaminase antibody IgA   Result Value Ref Range    Tissue Transglutaminase Antibody IgA <1  Negative   <7 U/mL

## 2017-02-10 NOTE — NURSING NOTE
"Chief Complaint   Patient presents with     RECHECK     Pt c/o sinus problem that has returned following completing ABX.       Initial /86 mmHg  Pulse 67  Temp(Src) 98.3  F (36.8  C) (Oral)  Wt 275 lb (124.739 kg)  SpO2 95%  LMP 02/01/2010  Breastfeeding? No Estimated body mass index is 45.43 kg/(m^2) as calculated from the following:    Height as of 1/2/17: 5' 5.25\" (1.657 m).    Weight as of this encounter: 275 lb (124.739 kg).  Medication Reconciliation: complete     Estee Coleman CMA (AAMA)  .    "

## 2017-02-18 DIAGNOSIS — K59.09 CONSTIPATION, CHRONIC: ICD-10-CM

## 2017-02-21 NOTE — TELEPHONE ENCOUNTER
Multiple Vitamins-Minerals (CENTROVITE) TABS      Last Written Prescription Date: 03/22/2016  Last Fill Quantity: 90,  # refills: 3   Last Office Visit with [facility], [facility] or [facility] prescribing provider: 01/30/2017                                             Lynn Bedolla MA

## 2017-02-22 RX ORDER — MULTIVITAMIN/IRON/FOLIC ACID 18MG-0.4MG
TABLET ORAL
Qty: 90 TABLET | Refills: 3 | Status: SHIPPED | OUTPATIENT
Start: 2017-02-22 | End: 2018-02-18

## 2017-02-22 NOTE — TELEPHONE ENCOUNTER
Prescription approved per [facility] Refill Protocol.    Signed Prescriptions:                        Disp   Refills    Multiple Vitamins-Minerals ([facility]S SPECTRAVIT*90 tab*3        Sig: TAKE 1 TABLET BY MOUTH DAILY  Authorizing Provider: CELSO VILLA  Ordering User: JOHANNA PAINTING RN, BSN

## 2017-03-17 DIAGNOSIS — E53.8 VITAMIN B12 DEFICIENCY (NON ANEMIC): ICD-10-CM

## 2017-03-17 RX ORDER — OMEGA-3/DHA/EPA/FISH OIL 35-113.5MG
TABLET,CHEWABLE ORAL
Qty: 90 TABLET | Refills: 1 | Status: SHIPPED | OUTPATIENT
Start: 2017-03-17 | End: 2017-09-01

## 2017-03-17 NOTE — TELEPHONE ENCOUNTER
cyanocobalamin (VITAMIN  B-12) 1000 MCG tablet        Last Written Prescription Date: 8/15/16  Last Fill Quantity: 90,    # refills: 1  Last Office Visit with G, UMP or [facility] prescribing provider:  2/9/17      Lab Results   Component Value Date    WBC 5.9 10/28/2016     Lab Results   Component Value Date    RBC 4.83 10/28/2016     Lab Results   Component Value Date    HGB 14.5 10/28/2016     Lab Results   Component Value Date    HCT 44.8 10/28/2016     No components found for: MCT  Lab Results   Component Value Date    MCV 93 10/28/2016     Lab Results   Component Value Date    MCH 30.0 10/28/2016     Lab Results   Component Value Date    MCHC 32.4 10/28/2016     Lab Results   Component Value Date    RDW 13.4 10/28/2016     Lab Results   Component Value Date     10/28/2016     Lab Results   Component Value Date    AST 22 11/21/2016     Lab Results   Component Value Date    ALT 38 11/21/2016     Creatinine   Date Value Ref Range Status   11/21/2016 0.81 0.52 - 1.04 mg/dL Final

## 2017-03-17 NOTE — TELEPHONE ENCOUNTER
Prescription approved per [facility] Refill Protocol.  Amber Scott RN

## 2017-03-22 ENCOUNTER — OFFICE VISIT (OUTPATIENT)
Dept: ORTHOPEDICS | Facility: CLINIC | Age: 57
End: 2017-03-22
Payer: COMMERCIAL

## 2017-03-22 ENCOUNTER — RADIANT APPOINTMENT (OUTPATIENT)
Dept: GENERAL RADIOLOGY | Facility: CLINIC | Age: 57
End: 2017-03-22
Attending: ORTHOPAEDIC SURGERY
Payer: COMMERCIAL

## 2017-03-22 VITALS — RESPIRATION RATE: 14 BRPM | HEIGHT: 65 IN | WEIGHT: 281.8 LBS | BODY MASS INDEX: 46.95 KG/M2

## 2017-03-22 DIAGNOSIS — M76.32 ILIOTIBIAL BAND FRICTION SYNDROME OF BOTH KNEES: ICD-10-CM

## 2017-03-22 DIAGNOSIS — M76.31 ILIOTIBIAL BAND FRICTION SYNDROME OF BOTH KNEES: ICD-10-CM

## 2017-03-22 DIAGNOSIS — Z96.653 STATUS POST TOTAL BILATERAL KNEE REPLACEMENT: Primary | ICD-10-CM

## 2017-03-22 DIAGNOSIS — Z96.653 STATUS POST TOTAL BILATERAL KNEE REPLACEMENT: ICD-10-CM

## 2017-03-22 PROCEDURE — 73562 X-RAY EXAM OF KNEE 3: CPT | Mod: LT

## 2017-03-22 PROCEDURE — 20610 DRAIN/INJ JOINT/BURSA W/O US: CPT | Mod: LT | Performed by: ORTHOPAEDIC SURGERY

## 2017-03-22 PROCEDURE — 99213 OFFICE O/P EST LOW 20 MIN: CPT | Mod: 25 | Performed by: ORTHOPAEDIC SURGERY

## 2017-03-22 ASSESSMENT — PAIN SCALES - GENERAL: PAINLEVEL: MODERATE PAIN (5)

## 2017-03-22 NOTE — MR AVS SNAPSHOT
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  Normal or non-critical lab and imaging results will be communicated to you by MyChart, letter or phone within 4 business days after the clinic has received the results. If you do not hear from us within 7 days, please contact the clinic through [employer] or phone. If you have a critical or abnormal lab result, we will notify you by phone as soon as possible.  Submit refill requests through [employer] or call your pharmacy and they will forward the refill request to us. Please allow 3 business days for your refill to be completed.          Additional Information About Your Visit        MyChart Information     [employer] gives you secure access to your electronic health record. If you see a primary care provider, you can also send messages to your care team and make appointments. If you have questions, please call your primary care clinic.  If you do not have a primary care provider, please call [phone] and they will assist you.        Care EveryWhere ID     This is your Care EveryWhere ID. This could be used by other  "organizations to access your [place] medical records  [id number]        Your Vitals Were     Respirations Height Last Period BMI (Body Mass Index)          14 1.657 m (5' 5.24\") 02/01/2010 46.55 kg/m2         Blood Pressure from Last 3 Encounters:   02/10/17 132/86   01/30/17 141/80   01/02/17 122/80    Weight from Last 3 Encounters:   03/22/17 127.8 kg (281 lb 12.8 oz)   02/10/17 124.7 kg (275 lb)   01/30/17 126 kg (277 lb 12.8 oz)               Primary Care Provider Office Phone # Fax #    Vania Ramsey JACKELINE Monroe [facility] [phone]       [facility] [address] [place] [phone]        Thank you!     Thank you for choosing [facility]  for your care. Our goal is always to provide you with excellent care. Hearing back from our patients is one way we can continue to improve our services. Please take a few minutes to complete the written survey that you may receive in the mail after your visit with us. Thank you!             Your Updated Medication List - Protect others around you: Learn how to safely use, store and throw away your medicines at www.disposemymeds.org.          This list is accurate as of: 3/22/17  3:24 PM.  Always use your most recent med list.                   Brand Name Dispense Instructions for use    AMBIEN 10 MG tablet   Generic drug:  zolpidem      1 TABLET DAILY AT BEDTIME       aspirin 81 MG EC tablet     100 tablet    TAKE 1 TABLET (81 MG) BY MOUTH DAILY       azithromycin 250 MG tablet    ZITHROMAX    6 tablet    Two tablets first day, then one tablet daily for four days.       * blood glucose lancets standard    no brand specified    1 Box    Use to test blood sugar one times daily or as directed.       * blood glucose lancets standard    no brand specified    1 Box    Use to test blood sugar one times daily or as directed.       * blood glucose monitoring meter device kit    no brand specified    1 kit    Use to test blood sugar " one times daily or as directed.       * blood glucose monitoring meter device kit    no brand specified    1 kit    Use to test blood sugar one times daily or as directed.       * blood glucose monitoring test strip    no brand specified    100 strip    Use to test blood sugars one times daily or as directed       * blood glucose monitoring test strip    no brand specified    100 strip    Use to test blood sugars one times daily or as directed       CVS SPECTRAVITE ULTRA WOMEN Tabs     90 tablet    TAKE 1 TABLET BY MOUTH DAILY       CVS vitamin  B12 1000 MCG Tabs   Generic drug:  cyanocobalamin     90 tablet    TAKE 1 TABLET (1,000 MCG) BY MOUTH DAILY       docusate sodium 100 MG capsule    COLACE    180 capsule    TAKE 1 CAPSULE (100 MG) BY MOUTH 2 TIMES DAILY       Ferrous Sulfate 143 (45 FE) MG Tbcr    CVS SLOW RELEASE IRON    180 tablet    Take 1 tablet by mouth 2 times daily       furosemide 40 MG tablet    LASIX    90 tablet    TAKE 1 TABLET (40 MG) BY MOUTH DAILY       insulin pen needle 31G X 5 MM    B-D U/F    100 each    Use once daily or as directed.       levothyroxine 75 MCG tablet    SYNTHROID/LEVOTHROID    90 tablet    Take 1 tablet (75 mcg) by mouth daily       lisinopril 10 MG tablet    PRINIVIL/ZESTRIL    90 tablet    TAKE 1 TABLET (10 MG) BY MOUTH DAILY       LORazepam 1 MG tablet    ATIVAN     Take 1 mg by mouth At Bedtime       omeprazole 20 MG CR capsule    priLOSEC    180 capsule    Take 1 capsule (20 mg) by mouth 2 times daily       order for DME     1 Box    Test strips for pt's glucometer, brand as covered by insurance. Test twice daily or PRN.       Oyster Shell Calcium 500 MG tablet    calcium carbonate    270 tablet    TAKE 1 TABLET BY MOUTH. THREE TIMES DAILY.       potassium chloride 20 MEQ CR tablet   Generic drug:  potassium chloride SA     180 tablet    TAKE 1 TABLET (20 MEQ) BY MOUTH 2 TIMES DAILY       PROVIGIL 200 MG tablet   Generic drug:  modafinil      Take 0.5 tablets by  mouth daily.       PROZAC 20 MG capsule   Generic drug:  FLUoxetine      Take 30 mg by mouth daily       ranitidine 150 MG tablet    ZANTAC    60 tablet    Take 1 tablet (150 mg) by mouth 2 times daily       simvastatin 20 MG tablet    ZOCOR    90 tablet    TAKE 1 TABLET (20 MG) BY MOUTH AT BEDTIME       vitamin D 99225 UNIT capsule    ERGOCALCIFEROL    8 capsule    TAKE 1 CAPSULE BY MOUTH ONCE WEEKLY.       * Notice:  This list has 6 medication(s) that are the same as other medications prescribed for you. Read the directions carefully, and ask your doctor or other care provider to review them with you.

## 2017-03-22 NOTE — PROGRESS NOTES
"HISTORY OF PRESENT ILLNESS    Gladys Singh is a 57 year old female follow up of painful left TKA 2004. She had a lateral release a couple of years ago on the left knee. We did an IT band injection about 1 year ago that helped until 1.5 week ago.     Present symptoms: same location of pain, but is not as severe compared to last year. She does have occasional hip and low back pain.     Previous history of bilateral TKA in 2004 with Dr. Prasad @ [facility] . Following surgery, had plain films that demonstrated atraumatic patellar dislocation. Had arthroscopic lateral retinacular release with post op hematoma    Resp 14  Ht 1.657 m (5' 5.24\")  Wt 127.8 kg (281 lb 12.8 oz)  LMP 02/01/2010  BMI 46.55 kg/m2    KNEE EXAM:   ROM: 0-120 degrees (L), good (R)  Effusion: mild (bilateral)   Tender: IT band (L)   Patellofemoral joint: minimal crepitations in the patellofemoral joint it seems to be coming from the lateral aspect of the knee.   There maybe some increased lateral laxity (L)  No pain with hip ROM    X-RAY: Obtained today of the BILATERAL KNEE: 3-views, reviewed in the office with the patient by myself today and show the components are all well fixed. No evidence of loosening.      Impression:   1. 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 Another possibility is referred pain from her back since she has had some right hip and leg pain in the past as well.     Plan:   Injection therapy: Discussed findings and diagnosis with patient. We talked about treatment options. We decided that a left knee cortisone injection would be the best option. Thus, With the patient's consent injected the left knee in the area of the IT band with Depo Medrol 80 mg, 4cc anesthetic after sterile prep. I had trouble getting down to the bone of the lateral epicondyle with a 1.5\" needle, but following the injection she felt immediate relief.     Return to clinic PRN.     KTAHRINE Wong MD  Dept. Orthopedic " Surgery  [facility]    This document serves as a record of the services and decisions personally performed and made by Dr. KATHRINE Wnog MD. It was created on his behalf by Luz Dill, a trained medical scribe. The creation of this record is based on the provider's personal observations and the statements of the patient. This document has been checked and approved by the attending provider.   Luz Dill March 22, 2017 3:48 PM

## 2017-03-22 NOTE — NURSING NOTE
"A steroid and or Hylan G - F 20 injection was performed on 3/22/2017 at 4:34 PM. Risks,benefits and complications of the injection were discussed with the patient and the patient has elected to proceed after verbal consent. Using sterile technique, the area was prepped with betadine . A 25 Gauge 1.5\" was used to inject the medication(s) listed below.This was well tolerated.    The following medication(s) was given:     MEDICATION: Depo Medrol 80mg per mL  ROUTE: local  SITE:IT band knee left   : s0cket (Depo-Medrol)  DOSE: 1 ml  LOT #: [id number]  EXPIRATION DATE:  6/2019    MEDICATION: Lidocaine HCL  1% per mL  : Hospira (Marcaine,Lidoncaine)  DOSE: 4 ml  LOT #: [id number]  EXPIRATION DATE:  4/19    Justin DELGADO    "

## 2017-03-22 NOTE — LETTER
"  3/22/2017       RE: Gladys Singh  [address]  [place] [address]           Dear Colleague,    Thank you for referring your patient, Gladys Singh, to the [facility]. Please see a copy of my visit note below.    HISTORY OF PRESENT ILLNESS    Gladys Singh is a 57 year old female follow up of painful left TKA 2004. She had a lateral release a couple of years ago on the left knee. We did an IT band injection about 1 year ago that helped until 1.5 week ago.     Present symptoms: same location of pain, but is not as severe compared to last year. She does have occasional hip and low back pain.     Previous history of bilateral TKA in 2004 with Dr. Prasad @ [facility] . Following surgery, had plain films that demonstrated atraumatic patellar dislocation. Had arthroscopic lateral retinacular release with post op hematoma    Resp 14  Ht 1.657 m (5' 5.24\")  Wt 127.8 kg (281 lb 12.8 oz)  LMP 02/01/2010  BMI 46.55 kg/m2    KNEE EXAM:   ROM: 0-120 degrees (L), good (R)  Effusion: mild (bilateral)   Tender: IT band (L)   Patellofemoral joint: minimal crepitations in the patellofemoral joint it seems to be coming from the lateral aspect of the knee.   There maybe some increased lateral laxity (L)  No pain with hip ROM    X-RAY: Obtained today of the BILATERAL KNEE: 3-views, reviewed in the office with the patient by myself today and show the components are all well fixed. No evidence of loosening.      Impression:   1. 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 Another possibility is referred pain from her back since she has had some right hip and leg pain in the past as well.     Plan:   Injection therapy: Discussed findings and diagnosis with patient. We talked about treatment options. We decided that a left knee cortisone injection would be the best option. Thus, With the patient's consent injected the left knee in the area of the IT band with Depo Medrol " "80 mg, 4cc anesthetic after sterile prep. I had trouble getting down to the bone of the lateral epicondyle with a 1.5\" needle, but following the injection she felt immediate relief.     Return to clinic PRN.     KATHRINE Wong MD  Dept. Orthopedic Surgery  [facility]    This document serves as a record of the services and decisions personally performed and made by Dr. KATHRINE Wong MD. It was created on his behalf by Luz Dill, a trained medical scribe. The creation of this record is based on the provider's personal observations and the statements of the patient. This document has been checked and approved by the attending provider.   Luz Dill March 22, 2017 3:48 PM    Again, thank you for allowing me to participate in the care of your patient.        Sincerely,    Dante Wong MD    "

## 2017-03-29 DIAGNOSIS — I10 BENIGN ESSENTIAL HYPERTENSION: ICD-10-CM

## 2017-03-30 NOTE — TELEPHONE ENCOUNTER
Aspirin 81 mg      Last Written Prescription Date: 8/15/16  Last Fill Quantity: 100,  # refills: 1   Last Office Visit with G, [facility]MP or [facility] prescribing provider: 1/2/17                                         Next 5 appointments (look out 90 days)     Jun 19, 2017 10:50 AM CDT   Return Visit with Roseline Fajardo MD, MG ENDO NURSE   [facility]    [address] [place] [id number]   [phone]

## 2017-04-13 ENCOUNTER — TELEPHONE (OUTPATIENT)
Dept: ENDOCRINOLOGY | Facility: CLINIC | Age: 57
End: 2017-04-13

## 2017-04-13 NOTE — TELEPHONE ENCOUNTER
"Call placed to patient to review that Dr. Fajardo will be able to see her before her originally scheduled appointment in June.  Patient verbalized understanding and stated if it works for him, that would be great.  Reviewed with patient that I will touch base with the endocrinology staff to secure a new appointment.  Patient expressed her gratefulness for Dr. Fajardo and team.    Patient did report that the symptoms that she was experiencing \"thyroid feeling larger\" has gotten better and she doesn't feel \"a lump in my throat anymore.\"  Patient states this feeling comes and goes.    Will follow up with patient once a new appointment date/time has been determined.    Routing to Endocrinology pool.          RE: Thyroid Ultrasound (?)  Received: 1 week ago       Roseline Fajardo MD Blue, Wendy, RN                   i can see her in clinic sooner to review her concerns            Previous Messages       ----- Message -----      From: Elizabeth Lujan RN      Sent: 3/27/2017   5:04 PM        To: Roseline Fajardo MD   Subject: Thyroid Ultrasound (?)                           Hi Dr Fajardo,   I just spoke with this patient who is concerned about seeing you prior to June appt. States she feels \"like her thyroid is getting larger\" and wonders if she should have an ultrasound done. Please advise.   Thanks, Elizabeth            "

## 2017-04-14 NOTE — TELEPHONE ENCOUNTER
Left message for patient to return call. Please transfer to Endocrine Clinic for scheduling of appt.     Alecia Deleon LPN  Adult Endocrinology  [facility]

## 2017-04-15 DIAGNOSIS — R35.89 DIURESIS: ICD-10-CM

## 2017-04-17 RX ORDER — POTASSIUM CHLORIDE 1500 MG/1
TABLET, EXTENDED RELEASE ORAL
Qty: 180 TABLET | Refills: 0 | Status: SHIPPED | OUTPATIENT
Start: 2017-04-17 | End: 2017-08-13

## 2017-04-17 NOTE — TELEPHONE ENCOUNTER
Potassium-chloride       Last Written Prescription Date: 01/18/2017  Last Fill Quantity: 180,  # refills: 0   Last Office Visit with G, [facility] or [facility] prescribing provider: 01/30/2017                                         Next 5 appointments (look out 90 days)     May 08, 2017 11:20 AM CDT   Return Visit with Roseline Fajardo MD, MG ENDO NURSE   [facility] ([facility])    [address] [place] [address]   [phone]

## 2017-04-18 ENCOUNTER — TRANSFERRED RECORDS (OUTPATIENT)
Dept: HEALTH INFORMATION MANAGEMENT | Facility: CLINIC | Age: 57
End: 2017-04-18

## 2017-04-19 ENCOUNTER — TRANSFERRED RECORDS (OUTPATIENT)
Dept: HEALTH INFORMATION MANAGEMENT | Facility: CLINIC | Age: 57
End: 2017-04-19

## 2017-04-22 DIAGNOSIS — R60.0 PEDAL EDEMA: ICD-10-CM

## 2017-04-22 DIAGNOSIS — E78.5 HYPERLIPIDEMIA LDL GOAL <100: ICD-10-CM

## 2017-04-23 DIAGNOSIS — K21.9 GASTROESOPHAGEAL REFLUX DISEASE, ESOPHAGITIS PRESENCE NOT SPECIFIED: ICD-10-CM

## 2017-04-24 RX ORDER — SIMVASTATIN 20 MG
TABLET ORAL
Qty: 90 TABLET | Refills: 0 | Status: SHIPPED | OUTPATIENT
Start: 2017-04-24 | End: 2017-07-18

## 2017-04-24 RX ORDER — FUROSEMIDE 40 MG
TABLET ORAL
Qty: 90 TABLET | Refills: 0 | Status: SHIPPED | OUTPATIENT
Start: 2017-04-24 | End: 2017-07-18

## 2017-04-24 NOTE — TELEPHONE ENCOUNTER
Simvastatin 20mg     Last Written Prescription Date: 3/17/17  Last Fill Quantity: 90, # refills: 3  Last Office Visit with [facility], [facility] or [facility] prescribing provider: 1/2/17  Next 5 appointments (look out 90 days)     May 08, 2017 11:20 AM CDT   Return Visit with Roseline Fajardo MD, MG ENDO NURSE   [facility] ([facility]    [address] [address] [address]   [phone]                   Lab Results   Component Value Date    CHOL 153 07/07/2016     Lab Results   Component Value Date    HDL 39 07/07/2016     Lab Results   Component Value Date    LDL 77 07/07/2016     Lab Results   Component Value Date    TRIG 185 07/07/2016     Lab Results   Component Value Date      CHOLHDLRATIO 3.2 02/27/2015     Furosemide 40mg      Last Written Prescription Date: 3/25/17  Last Fill Quantity: 90, # refills: 1  Last Office Visit with [facility], [facility] or [facility] prescribing provider: 1/2/17  Next 5 appointments (look out 90 days)     May 08, 2017 11:20 AM CDT   Return Visit with Roseline Fajardo MD, MG ENDO NURSE   [facility] ([facility])    [address] [address] [place] [id number] [phone]                   Potassium   Date Value Ref Range Status   11/21/2016 3.5 3.4 - 5.3 mmol/L Final     Creatinine   Date Value Ref Range Status   11/21/2016 0.81 0.52 - 1.04 mg/dL Final     BP Readings from Last 3 Encounters:   02/10/17 132/86   01/30/17 141/80   01/02/17 122/80     SERJIO/MA

## 2017-04-24 NOTE — TELEPHONE ENCOUNTER
Prescription approved per [facility] Refill Protocol.  Judith Cobos RN

## 2017-04-25 DIAGNOSIS — I10 ESSENTIAL HYPERTENSION WITH GOAL BLOOD PRESSURE LESS THAN 140/90: ICD-10-CM

## 2017-04-25 DIAGNOSIS — E11.9 TYPE 2 DIABETES MELLITUS WITHOUT COMPLICATION, WITHOUT LONG-TERM CURRENT USE OF INSULIN (H): ICD-10-CM

## 2017-04-25 DIAGNOSIS — J34.89 SINUS PRESSURE: ICD-10-CM

## 2017-04-25 RX ORDER — AZITHROMYCIN 250 MG/1
TABLET, FILM COATED ORAL
Qty: 6 TABLET | Refills: 1 | OUTPATIENT
Start: 2017-04-25

## 2017-04-25 NOTE — TELEPHONE ENCOUNTER
Prescription approved per [facility] Refill Protocol.    Signed Prescriptions:                        Disp   Refills    ranitidine (ZANTAC) 150 MG tablet          60 tab*5        Sig: TAKE 1 TABLET (150 MG) BY MOUTH 2 TIMES DAILY  Authorizing Provider: NAKUL MONK  Ordering User: JOHANNA PAINTING, RN, BSN

## 2017-04-25 NOTE — TELEPHONE ENCOUNTER
lisinopril (PRINIVIL,ZESTRIL) 10 MG tablet      Last Written Prescription Date: 11/10/16  Last Fill Quantity: 90, # refills: 1  Last Office Visit with [facility], [facility] or [facility] prescribing provider: 1/30/17  Next 5 appointments (look out 90 days)     May 08, 2017 11:20 AM CDT   Return Visit with Roseline Fajardo MD, MG ENDO NURSE   [facility] ([facility])    [address] [place] [address] [phone]                   Potassium   Date Value Ref Range Status   11/21/2016 3.5 3.4 - 5.3 mmol/L Final     Creatinine   Date Value Ref Range Status   11/21/2016 0.81 0.52 - 1.04 mg/dL Final     BP Readings from Last 3 Encounters:   02/10/17 132/86   01/30/17 141/80   01/02/17 122/80

## 2017-04-25 NOTE — TELEPHONE ENCOUNTER
Patient needs an appointment for antibiotics to be prescribed.  They may not be prescribed at an appointment.    Vania Monroe, CNP

## 2017-04-25 NOTE — TELEPHONE ENCOUNTER
ranitidine (ZANTAC) 150 MG tablet      Last Written Prescription Date: 1/2/17  Last Fill Quantity: 60,  # refills: 3   Last Office Visit with [facility], [facility] or [facility] prescribing provider: 1/30/17                                         Next 5 appointments (look out 90 days)     May 08, 2017 11:20 AM CDT   Return Visit with Roseline Fajardo MD, MG ENDO NURSE   [facility] ([facility])    [address] [place] [phone]   [phone]

## 2017-04-25 NOTE — TELEPHONE ENCOUNTER
Spoke to patient in regards to message below. Patient states she tried to order her meds online and meant to take this med off but accidentally reordered. Patient says to please disregard.    Sofía Carter CMA

## 2017-04-26 ENCOUNTER — TELEPHONE (OUTPATIENT)
Dept: ENDOCRINOLOGY | Facility: CLINIC | Age: 57
End: 2017-04-26

## 2017-04-26 ENCOUNTER — TRANSFERRED RECORDS (OUTPATIENT)
Dept: HEALTH INFORMATION MANAGEMENT | Facility: CLINIC | Age: 57
End: 2017-04-26

## 2017-04-26 DIAGNOSIS — R79.89 ELEVATED PTHRP LEVEL: ICD-10-CM

## 2017-04-26 RX ORDER — LISINOPRIL 10 MG/1
TABLET ORAL
Qty: 90 TABLET | Refills: 1 | Status: SHIPPED | OUTPATIENT
Start: 2017-04-26 | End: 2017-10-05

## 2017-04-26 NOTE — TELEPHONE ENCOUNTER
Patient rescheduled for 8/28/17.    Alecia Deleon LPN  Adult Endocrinology  [facility]

## 2017-04-26 NOTE — TELEPHONE ENCOUNTER
[facility] Call Center    Phone Message    Name of Caller: Gladys    Phone Number: Home number on file [phone] (home)    Best time to return call: any    May a detailed message be left on voicemail: yes    Relation to patient: Self    Reason for Call: Other: Patient may have an issue with upcoming scheduled appt with Dr may.  can someone call her to find out if her appt can be changed close to this appt?      Action Taken: Message routed to:  Adult Clinics: Endocrinology p [phone]

## 2017-04-26 NOTE — TELEPHONE ENCOUNTER
Prescription approved per [facility] Refill Protocol.  Sondra Cohen, RN - BC

## 2017-04-27 RX ORDER — IBUPROFEN 200 MG
CAPSULE ORAL
Qty: 270 TABLET | Refills: 0 | Status: SHIPPED | OUTPATIENT
Start: 2017-04-27 | End: 2017-12-14

## 2017-04-27 NOTE — TELEPHONE ENCOUNTER
Calcium Carbonate      Last Written Prescription Date: 11/10/16  Last Fill Quantity: 270,  # refills: 1   Last Office Visit with [facility], [facility] or [facility] prescribing provider: 1/30/17

## 2017-04-27 NOTE — TELEPHONE ENCOUNTER
Prescription approved per [facility] Refill Protocol.  Sondra Cohen, RN - BC

## 2017-05-11 ENCOUNTER — TRANSFERRED RECORDS (OUTPATIENT)
Dept: HEALTH INFORMATION MANAGEMENT | Facility: CLINIC | Age: 57
End: 2017-05-11

## 2017-05-15 ENCOUNTER — TRANSFERRED RECORDS (OUTPATIENT)
Dept: HEALTH INFORMATION MANAGEMENT | Facility: CLINIC | Age: 57
End: 2017-05-15

## 2017-05-19 ENCOUNTER — TRANSFERRED RECORDS (OUTPATIENT)
Dept: HEALTH INFORMATION MANAGEMENT | Facility: CLINIC | Age: 57
End: 2017-05-19

## 2017-05-30 ENCOUNTER — TRANSFERRED RECORDS (OUTPATIENT)
Dept: HEALTH INFORMATION MANAGEMENT | Facility: CLINIC | Age: 57
End: 2017-05-30

## 2017-06-03 DIAGNOSIS — E11.9 TYPE 2 DIABETES MELLITUS WITHOUT COMPLICATION, WITHOUT LONG-TERM CURRENT USE OF INSULIN (H): Primary | ICD-10-CM

## 2017-06-05 NOTE — TELEPHONE ENCOUNTER
blood glucose (NO BRAND SPECIFIED) lancets standard      Last Written Prescription Date: 12/2/2016  Last Fill Quantity: 1 box,  # refills: 0   Last Office Visit with [facility], [facility] or [facility] prescribing provider: 1/30/2017

## 2017-06-06 NOTE — TELEPHONE ENCOUNTER
Prescription approved per [facility] Refill Protocol.  Sondra Cohen, RN - BC

## 2017-06-28 ENCOUNTER — CARE COORDINATION (OUTPATIENT)
Dept: CARE COORDINATION | Facility: CLINIC | Age: 57
End: 2017-06-28

## 2017-06-28 ENCOUNTER — TELEPHONE (OUTPATIENT)
Dept: FAMILY MEDICINE | Facility: CLINIC | Age: 57
End: 2017-06-28

## 2017-06-28 NOTE — LETTER
[address]  [place] [address]  Phone: [phone]            June 28, 2017          Gladys Singh,  [address]  [place] [address]        Dear Gladys Singh      Monitoring and managing your preventative and chronic health conditions are very important to us. Our records indicate that you have not scheduled for Diabetic Check and HgbA1C  which was recommended by Vania Monroe.      If you have received your health care elsewhere, please call the clinic so the information can be documented in your chart.    Please call [phone] or message us through your [employer] account to schedule an appointment or provide information for your chart.     Feel free to contact us if you have any questions or concerns!    I look forward to seeing you and working with you on your health care needs.     Sincerely,         Vania Monroe / Caitlyn CHISHOLM CMA ([facility])

## 2017-06-28 NOTE — LETTER
[facility]  [address]  [place] [address]  [phone]      June 28, 2017      Gladys Singh  [address]  [place] [address]        Dear Gladys,    I am a SW Care Coordinator, working with the care team at your clinic including your primary care provider,Vania Monroe.  I have been trying to reach you to introduce you to [facility] Care Coordination Program. The Care Coordinator is a nurse or  who understands the health care system. The goal of Care Coordination is to help you manage your health and improve access to the [facility] system in the most efficient manner.      The registered nurse (RN) assists you in meeting your health care goals by providing education, coordinating services, and strengthening the communication among your providers. The  (SW) is available to assist in financial, behavioral, psychosocial, and chemical dependency and counseling/psychiatric resources.     Please feel free to contact me at [phone]. I look forward to your call and partnering with you to achieve your optimal state of wellness.  We at [facility] are focused on providing you with the highest-quality healthcare experience possible and that all starts with you.       Sincerely,         Deborah Cortes, BETTYE  Care Coordination  [facility] [place]  [phone]  [email or web address]

## 2017-06-28 NOTE — PROGRESS NOTES
Clinic Care Coordination Contact  [facility]/Voicemail    Referral Source: Pro-Active Outreach  Clinical Data: Care Coordinator Outreach  Outreach attempted x 1.  Left message on voicemail with call back information and requested return call.  Plan: Care Coordinator will mail out care coordination introduction letter with care coordinator contact information and explanation of care coordination services. Care Coordinator will try to reach patient again in 3-5 business days.    BETTYE Cabrera  Care Coordination  [place]  [phone]  [email or web address]

## 2017-06-28 NOTE — TELEPHONE ENCOUNTER
Panel Management Review      Patient has the following on her problem list:     Diabetes    ASA: Failed    Last A1C  Lab Results   Component Value Date    A1C 6.0 12/06/2016    A1C 5.8 08/09/2016    A1C 6.4 04/07/2016    A1C 5.9 06/15/2015    A1C 5.9 02/27/2015     A1C tested: FAILED    Last LDL:    Lab Results   Component Value Date    CHOL 153 07/07/2016     Lab Results   Component Value Date    HDL 39 07/07/2016     Lab Results   Component Value Date    LDL 77 07/07/2016     Lab Results   Component Value Date    TRIG 185 07/07/2016     Lab Results   Component Value Date    CHOLHDLRATIO 3.2 02/27/2015     Lab Results   Component Value Date    NHDL 114 07/07/2016       Is the patient on a Statin? YES             Is the patient on Aspirin? YES    Medications     HMG CoA Reductase Inhibitors    simvastatin (ZOCOR) 20 MG tablet    Salicylates    aspirin 81 MG EC tablet          Last three blood pressure readings:  BP Readings from Last 3 Encounters:   02/10/17 132/86   01/30/17 141/80   01/02/17 122/80       Date of last diabetes office visit: 12/06/2016     Tobacco History:     History   Smoking Status     Former Smoker     Quit date: 1/1/1994   Smokeless Tobacco     Never Used           Composite cancer screening  Chart review shows that this patient is due/due soon for the following None  Summary:    Patient is due/failing the following:   A1C and PHQ9    Action needed:   Patient needs office visit for Diabetes follow up.    Type of outreach:    Sent letter.    Questions for provider review:    None                                                                                                                                    Caitlyn CHISHOLM CMA ([facility])

## 2017-07-18 DIAGNOSIS — E78.5 HYPERLIPIDEMIA LDL GOAL <100: ICD-10-CM

## 2017-07-18 DIAGNOSIS — R60.0 PEDAL EDEMA: ICD-10-CM

## 2017-07-18 NOTE — TELEPHONE ENCOUNTER
simvastatin (ZOCOR) 20 MG tablet  Last Written Prescription Date: 4/24/17  Last Fill Quantity: 90, # refills: 0  Last Office Visit with [facility], [facility] or [facility] prescribing provider: na       Lab Results   Component Value Date    CHOL 153 07/07/2016     Lab Results   Component Value Date    HDL 39 07/07/2016     Lab Results   Component Value Date    LDL 77 07/07/2016     Lab Results   Component Value Date    TRIG 185 07/07/2016     Lab Results   Component Value Date    CHOLHDLRATIO 3.2 02/27/2015

## 2017-07-18 NOTE — TELEPHONE ENCOUNTER
furosemide (LASIX) 40 MG tablet  Last Written Prescription Date: 4/24/17  Last Fill Quantity: 90, # refills: 0  Last Office Visit with [facility] or [facility] prescribing provider: na       Potassium   Date Value Ref Range Status   11/21/2016 3.5 3.4 - 5.3 mmol/L Final     Creatinine   Date Value Ref Range Status   11/21/2016 0.81 0.52 - 1.04 mg/dL Final     BP Readings from Last 3 Encounters:   02/10/17 132/86   01/30/17 141/80   01/02/17 122/80

## 2017-07-19 RX ORDER — SIMVASTATIN 20 MG
TABLET ORAL
Qty: 30 TABLET | Refills: 0 | Status: SHIPPED | OUTPATIENT
Start: 2017-07-19 | End: 2017-08-14

## 2017-07-19 RX ORDER — FUROSEMIDE 40 MG
TABLET ORAL
Qty: 90 TABLET | Refills: 0 | Status: SHIPPED | OUTPATIENT
Start: 2017-07-19 | End: 2017-11-07

## 2017-07-19 NOTE — TELEPHONE ENCOUNTER
Prescription approved per [facility] Refill Protocol.  Sondra Cohen, RN - BC

## 2017-07-19 NOTE — TELEPHONE ENCOUNTER
Prescription approved per [facility] Refill Protocol.  Patient due for labs for further refills.  Sondra Cohen RN - BC

## 2017-08-13 DIAGNOSIS — E21.3 HYPERPARATHYROIDISM (H): ICD-10-CM

## 2017-08-13 DIAGNOSIS — R10.13 ABDOMINAL PAIN, EPIGASTRIC: ICD-10-CM

## 2017-08-13 DIAGNOSIS — E55.9 VITAMIN D DEFICIENCY: ICD-10-CM

## 2017-08-13 DIAGNOSIS — E03.9 ACQUIRED HYPOTHYROIDISM: ICD-10-CM

## 2017-08-13 DIAGNOSIS — R35.89 DIURESIS: ICD-10-CM

## 2017-08-14 DIAGNOSIS — E78.5 HYPERLIPIDEMIA LDL GOAL <100: ICD-10-CM

## 2017-08-14 RX ORDER — ERGOCALCIFEROL 1.25 MG/1
CAPSULE, LIQUID FILLED ORAL
Qty: 8 CAPSULE | Refills: 1 | Status: SHIPPED | OUTPATIENT
Start: 2017-08-14 | End: 2017-11-07

## 2017-08-14 NOTE — TELEPHONE ENCOUNTER
Potassium-Chloride       Last Written Prescription Date: 04/17/2017  Last Fill Quantity: 180,  # refills: 0   Last Office Visit with [facility], [facility] or [facility] prescribing provider: 01/30/2017

## 2017-08-14 NOTE — TELEPHONE ENCOUNTER
Simvastatin      Last Written Prescription Date: 07/19/2017  Last Fill Quantity: 30, # refills: 0  Last Office Visit with [facility] or [facility] prescribing provider: 01/30/2017       Lab Results   Component Value Date    CHOL 153 07/07/2016     Lab Results   Component Value Date    HDL 39 07/07/2016     Lab Results   Component Value Date    LDL 77 07/07/2016     Lab Results   Component Value Date    TRIG 185 07/07/2016     Lab Results   Component Value Date    CHOLHDLRATIO 3.2 02/27/2015

## 2017-08-15 ENCOUNTER — ALLIED HEALTH/NURSE VISIT (OUTPATIENT)
Dept: NURSING | Facility: CLINIC | Age: 57
End: 2017-08-15
Payer: COMMERCIAL

## 2017-08-15 DIAGNOSIS — F33.1 MAJOR DEPRESSIVE DISORDER, RECURRENT EPISODE, MODERATE (H): Primary | ICD-10-CM

## 2017-08-15 PROCEDURE — 93005 ELECTROCARDIOGRAM TRACING: CPT

## 2017-08-15 RX ORDER — SIMVASTATIN 20 MG
TABLET ORAL
Qty: 30 TABLET | Refills: 0 | Status: SHIPPED | OUTPATIENT
Start: 2017-08-15 | End: 2017-09-01

## 2017-08-15 RX ORDER — POTASSIUM CHLORIDE 1500 MG/1
TABLET, EXTENDED RELEASE ORAL
Qty: 180 TABLET | Refills: 0 | Status: SHIPPED | OUTPATIENT
Start: 2017-08-15 | End: 2017-12-18

## 2017-08-15 NOTE — TELEPHONE ENCOUNTER
Spoke to patient in regards to message below. Patient states understanding and will schedule appointment when she comes in today for ancillary appt.      Sofía Carter, CMA

## 2017-08-15 NOTE — TELEPHONE ENCOUNTER
Called patient and left VM to call clinic.  Fariba THOMPSON CMA ([facility])

## 2017-08-15 NOTE — TELEPHONE ENCOUNTER
Routing refill request to provider for review/approval because:  Roxy given x1 and patient did not follow up, please advise  Labs not current:  WINIFRED Cohen RN - BC

## 2017-08-15 NOTE — TELEPHONE ENCOUNTER
Medication is being filled for 1 time refill only due to:  Patient needs to be seen because pt OVERDUE for diabetic recheck. was due in June..   Please call to schedule appointment.  Judith Cobos RN

## 2017-08-15 NOTE — MR AVS SNAPSHOT
After Visit Summary   8/15/2017    Gladys Singh    MRN: [id number]           Patient Information     Date Of Birth          1960        Visit Information        Provider Department      8/15/2017 11:15 AM [facility] ANCILLARY [facility]        Today's Diagnoses     Major depressive disorder, recurrent episode, moderate (H)    -  1       Follow-ups after your visit        Your next 10 appointments already scheduled     Aug 16, 2017  9:40 AM CDT   Office Visit with JACKELINE Arenas CNP   [facility]    [address] [address] [phone]           Bring a current list of meds and any records pertaining to this visit. For Physicals, please bring immunization records and any forms needing to be filled out. Please arrive 10 minutes early to complete paperwork.              Who to contact     If you have questions or need follow up information about today's clinic visit or your schedule please contact [facility] directly at [phone].  Normal or non-critical lab and imaging results will be communicated to you by [employer], letter or phone within 4 business days after the clinic has received the results. If you do not hear from us within 7 days, please contact the clinic through [employer] or phone. If you have a critical or abnormal lab result, we will notify you by phone as soon as possible.  Submit refill requests through [employer] or call your pharmacy and they will forward the refill request to us. Please allow 3 business days for your refill to be completed.          Additional Information About Your Visit        S[employer] Information     [employer] gives you secure access to your electronic health record. If you see a primary care provider, you can also send messages to your care team and make appointments. If you have questions, please call your primary care clinic.  If you do not have a primary care  provider, please call [phone] and they will assist you.        Care EveryWhere ID     This is your Care EveryWhere ID. This could be used by other organizations to access your [facility] medical records  [id number]        Your Vitals Were     Last Period                   02/01/2010            Blood Pressure from Last 3 Encounters:   02/10/17 132/86   01/30/17 141/80   01/02/17 122/80    Weight from Last 3 Encounters:   03/22/17 281 lb 12.8 oz (127.8 kg)   02/10/17 275 lb (124.7 kg)   01/30/17 277 lb 12.8 oz (126 kg)              We Performed the Following     EKG 12-lead complete w/read - Clinics        Primary Care Provider Office Phone # Fax #    Vania Ramsey JACKELINE Monroe [phone] [address] [address]        Equal Access to Services     CAYETANO URBANO : Hadii aad ku hadasho Soomaali, waaxda luqadaha, qaybta kaalmada adeegyada, waxay idiin hayaan adeseamus kharash laArcadioaan . So [facility] [phone].    ATENCIÓN: Si habla español, tiene a eason disposición servicios gratuitos de asistencia lingüística. Anshulame al [phone].    We comply with applicable federal civil rights laws and [place] laws. We do not discriminate on the basis of race, color, national origin, age, disability sex, sexual orientation or gender identity.            Thank you!     Thank you for choosing [facility]  for your care. Our goal is always to provide you with excellent care. Hearing back from our patients is one way we can continue to improve our services. Please take a few minutes to complete the written survey that you may receive in the mail after your visit with us. Thank you!             Your Updated Medication List - Protect others around you: Learn how to safely use, store and throw away your medicines at www.disposemymeds.org.          This list is accurate as of: 8/15/17 11:40 AM.  Always use your most recent med list.                   Brand Name Dispense Instructions  for use Diagnosis    AMBIEN 10 MG tablet   Generic drug:  zolpidem      1 TABLET DAILY AT BEDTIME        aspirin 81 MG EC tablet     100 tablet    TAKE 1 TABLET (81 MG) BY MOUTH DAILY    Benign essential hypertension       azithromycin 250 MG tablet    ZITHROMAX    6 tablet    Two tablets first day, then one tablet daily for four days.    Sinus pressure       * blood glucose lancets standard    no brand specified    1 Box    Use to test blood sugar one times daily or as directed.    Type 2 diabetes mellitus without complication, without long-term current use of insulin (H)       * blood glucose lancets standard    no brand specified    1 Box    Use to test blood sugar one times daily or as directed.    Type 2 diabetes mellitus without complication, without long-term current use of insulin (H)       blood glucose monitoring lancets     1 Box    TEST ONCE DAILY OR AS NEEDED *DISPENSE WHATEVER IS COVERED BY INS*    Type 2 diabetes mellitus without complication, without long-term current use of insulin (H)       * blood glucose monitoring meter device kit    no brand specified    1 kit    Use to test blood sugar one times daily or as directed.    Type 2 diabetes mellitus without complication, without long-term current use of insulin (H)       * blood glucose monitoring meter device kit    no brand specified    1 kit    Use to test blood sugar one times daily or as directed.    Type 2 diabetes mellitus without complication, without long-term current use of insulin (H)       * blood glucose monitoring test strip    no brand specified    100 strip    Use to test blood sugars one times daily or as directed    Type 2 diabetes mellitus without complication, without long-term current use of insulin (H)       * blood glucose monitoring test strip    no brand specified    100 strip    Use to test blood sugars one times daily or as directed    Type 2 diabetes mellitus without complication, without long-term current use of insulin  (H)       calcium carbonate 1250 (500 CA) MG Tabs tablet    OSCAL 500    270 tablet    TAKE 1 TABLET BY MOUTH. THREE TIMES DAILY.    Elevated PTHrP level (H)       CVS SPECTRAVITE ULTRA WOMEN Tabs     90 tablet    TAKE 1 TABLET BY MOUTH DAILY    Constipation, chronic       [facility] vitamin  B12 1000 MCG Tabs   Generic drug:  cyanocobalamin     90 tablet    TAKE 1 TABLET (1,000 MCG) BY MOUTH DAILY    Vitamin B12 deficiency (non anemic)       docusate sodium 100 MG capsule    COLACE    180 capsule    TAKE 1 CAPSULE (100 MG) BY MOUTH 2 TIMES DAILY    Constipation, unspecified constipation type       Ferrous Sulfate 143 (45 FE) MG Tbcr    CVS SLOW RELEASE IRON    180 tablet    Take 1 tablet by mouth 2 times daily    Iron deficiency       furosemide 40 MG tablet    LASIX    90 tablet    TAKE 1 TABLET (40 MG) BY MOUTH DAILY    Pedal edema       insulin pen needle 31G X 5 MM    B-D U/F    100 each    Use once daily or as directed.    Type 2 diabetes mellitus without complication (H)       levothyroxine 75 MCG tablet    SYNTHROID/LEVOTHROID    90 tablet    Take 1 tablet (75 mcg) by mouth daily    Acquired hypothyroidism, Vitamin D deficiency, Hyperparathyroidism (H), Abdominal pain, epigastric       lisinopril 10 MG tablet    PRINIVIL/ZESTRIL    90 tablet    TAKE 1 TABLET (10 MG) BY MOUTH DAILY    Type 2 diabetes mellitus without complication, without long-term current use of insulin (H), Essential hypertension with goal blood pressure less than 140/90       LORazepam 1 MG tablet    ATIVAN     Take 1 mg by mouth At Bedtime        omeprazole 20 MG CR capsule    priLOSEC    180 capsule    Take 1 capsule (20 mg) by mouth 2 times daily    Hiatal hernia       order for DME     1 Box    Test strips for pt's glucometer, brand as covered by insurance. Test twice daily or PRN.    Type 2 diabetes mellitus without complication (H)       potassium chloride 20 MEQ CR tablet   Generic drug:  potassium chloride SA     180 tablet    TAKE 1  TABLET (20 MEQ) BY MOUTH 2 TIMES DAILY    Diuresis       PROVIGIL 200 MG tablet   Generic drug:  modafinil      Take 0.5 tablets by mouth daily.        PROZAC 20 MG capsule   Generic drug:  FLUoxetine      Take 30 mg by mouth daily    New daily persistent headache       ranitidine 150 MG tablet    ZANTAC    60 tablet    TAKE 1 TABLET (150 MG) BY MOUTH 2 TIMES DAILY    Gastroesophageal reflux disease, esophagitis presence not specified       simvastatin 20 MG tablet    ZOCOR    30 tablet    TAKE 1 TABLET (20 MG) BY MOUTH AT BEDTIME NEED LABS    Hyperlipidemia LDL goal <100       vitamin D 80866 UNIT capsule    ERGOCALCIFEROL    8 capsule    TAKE 1 CAPSULE BY MOUTH ONCE WEEKLY.    Vitamin D deficiency, Hyperparathyroidism (H), Acquired hypothyroidism, Abdominal pain, epigastric       * Notice:  This list has 6 medication(s) that are the same as other medications prescribed for you. Read the directions carefully, and ask your doctor or other care provider to review them with you.

## 2017-08-15 NOTE — NURSING NOTE
"Chief Complaint   Patient presents with     EKG     Per Dr. Jhonathan Chance       Initial LMP 02/01/2010 Estimated body mass index is 46.55 kg/(m^2) as calculated from the following:    Height as of 3/22/17: 5' 5.24\" (1.657 m).    Weight as of 3/22/17: 281 lb 12.8 oz (127.8 kg).  Medication Reconciliation: unable or not appropriate to perform   Per Duy Chance at [facility] patient came in for EKG. EKG completed and results faxed to [phone].  Fariba THOMPSON CMA ([facility])      "

## 2017-08-16 NOTE — PROGRESS NOTES
SUBJECTIVE:                                                    Gladys Singh is a 57 year old female who presents to clinic today for the following health issues:      Chief Complaint   Patient presents with     Thyroid Problem     PHQ-9/DAP     Recheck Medication     DM     Chest Pain     EKG two days ago     Palpitations     SERJIO/MA      Diabetes Follow-up    Patient has started bydureon and notes her blood sugars have been well-controlled.  Patient's last HgbA1C was 6.0.    Hyperlipidemia Follow-Up      Rate your low fat/cholesterol diet?: good    Taking statin?  Yes, no muscle aches from statin    Other lipid medications/supplements?:  none    Hypertension Follow-up      Outpatient blood pressures are not being checked.    Low Salt Diet: no added salt    Hypothyroidism Follow-up    Patient has established care with Dr. Marcos at [facility].  She recently had lab work done there.  Per patient, she was taken off her levothyroxine to see if she actually had hypothyroidism and then was restarted on 8/4/17.    Patient recently underwent med changes for her depression and anxiety.  She feels depression is okay, but anxiety is increased.  Patient notes some chest tightness that is intermittent and palpitations.  Her psychiatrist ordered an EKG for patient due to having chest pain and recommended patient follow-up with PCP to review it.  Patient denies nausea, vomiting, diaphoresis, shortness of breath with pain.  Pain does not radiate.  Pain occurs at rest, not with activity.  Symptoms last up to 2-3 hours.        Problem list and histories reviewed & adjusted, as indicated.  Additional history: as documented    Patient Active Problem List   Diagnosis     GERD (gastroesophageal reflux disease)     Pedal edema     Major depressive disorder, recurrent episode, severe (H)     Fatigue     Hypothyroidism     Anxiety     Vegetarian     KHUSHI (obstructive sleep apnea)      Hyperparathyroidism (H)     Iron deficiency     Hyperlipidemia LDL goal <100     Papanicolaou smear of cervix with atypical squamous cells cannot exclude high grade squamous intraepithelial lesion (ASC-H)     Constipation, chronic     Hiatal hernia     Thumb pain     New daily persistent headache     Left knee pain     Morbid obesity due to excess calories (H)     Iliotibial band tendinitis of left side     Essential hypertension     Type 2 diabetes mellitus without complication, without long-term current use of insulin (H)     H/O total knee replacement, bilateral     Past Surgical History:   Procedure Laterality Date     APPENDECTOMY       ARTHROSCOPY KNEE RT/LT       C  DELIVERY ONLY      x 2     C TOTAL KNEE ARTHROPLASTY  2004    bilateral     COLONOSCOPY  , ,     early Crohn's     HC COLP CERVIX/UPPER VAGINA W BX CERVIX  ,     neg     HC ESOPHAGOSCOPY, DIAGNOSTIC  , ,     benign, fungal infection     HC REPAIR OF NASAL SEPTUM       HERNIA REPAIR, UMBILICAL       PE TUBES       TONSILLECTOMY & ADENOIDECTOMY       total knee Bilateral 2004    BRAD Mikael Prasad MD       Social History   Substance Use Topics     Smoking status: Former Smoker     Quit date: 1994     Smokeless tobacco: Never Used     Alcohol use No     Family History   Problem Relation Age of Onset     DIABETES Mother      CANCER Mother      endometrial     DIABETES Father      Prostate Cancer Father      C.A.D. Father      DIABETES Maternal Grandmother      CEREBROVASCULAR DISEASE Paternal Grandfather      Cancer - colorectal Sister      Neurologic Disorder Daughter      anxiety     Neurologic Disorder Daughter      anxiety, bipolar         Current Outpatient Prescriptions   Medication Sig Dispense Refill     BusPIRone HCl (BUSPAR PO) Take 30 mg by mouth 2 times daily       Cholecalciferol (VITAMIN D3) 3000 UNITS TABS        exenatide ER (BYDUREON) 2 MG pen Inject 2 mg Subcutaneous every 7 days        POTASSIUM CHLORIDE 20 MEQ CR tablet TAKE 1 TABLET (20 MEQ) BY MOUTH 2 TIMES DAILY 180 tablet 0     simvastatin (ZOCOR) 20 MG tablet TAKE 1 TABLET (20 MG) BY MOUTH AT BEDTIME NEED LABS 30 tablet 0     vitamin D (ERGOCALCIFEROL) 61865 UNIT capsule TAKE 1 CAPSULE BY MOUTH ONCE WEEKLY. 8 capsule 1     furosemide (LASIX) 40 MG tablet TAKE 1 TABLET (40 MG) BY MOUTH DAILY 90 tablet 0     blood glucose monitoring (SAMUEL MICROLET) lancets TEST ONCE DAILY OR AS NEEDED *DISPENSE WHATEVER IS COVERED BY INS* 1 Box 3     calcium carbonate (OSCAL 500) 1250 (500 CA) MG TABS tablet TAKE 1 TABLET BY MOUTH. THREE TIMES DAILY. 270 tablet 0     lisinopril (PRINIVIL/ZESTRIL) 10 MG tablet TAKE 1 TABLET (10 MG) BY MOUTH DAILY 90 tablet 1     ranitidine (ZANTAC) 150 MG tablet TAKE 1 TABLET (150 MG) BY MOUTH 2 TIMES DAILY 60 tablet 5     aspirin 81 MG EC tablet TAKE 1 TABLET (81 MG) BY MOUTH DAILY 100 tablet 2     CVS VITAMIN  B12 1000 MCG TABS TAKE 1 TABLET (1,000 MCG) BY MOUTH DAILY 90 tablet 1     Multiple Vitamins-Minerals (CVS SPECTRAVITE ULTRA WOMEN) TABS TAKE 1 TABLET BY MOUTH DAILY 90 tablet 3     blood glucose monitoring (NO BRAND SPECIFIED) test strip Use to test blood sugars one times daily or as directed 100 strip 3     blood glucose monitoring (NO BRAND SPECIFIED) meter device kit Use to test blood sugar one times daily or as directed. 1 kit 0     blood glucose (NO BRAND SPECIFIED) lancets standard Use to test blood sugar one times daily or as directed. 1 Box 3     omeprazole (PRILOSEC) 20 MG CR capsule Take 1 capsule (20 mg) by mouth 2 times daily 180 capsule 3     levothyroxine (SYNTHROID/LEVOTHROID) 75 MCG tablet Take 1 tablet (75 mcg) by mouth daily 90 tablet 3     blood glucose monitoring (NO BRAND SPECIFIED) meter device kit Use to test blood sugar one times daily or as directed. 1 kit 0     blood glucose (NO BRAND SPECIFIED) lancets standard Use to test blood sugar one times daily or as directed. 1 Box 0     blood glucose  monitoring (NO BRAND SPECIFIED) test strip Use to test blood sugars one times daily or as directed 100 strip 3     docusate sodium (COLACE) 100 MG capsule TAKE 1 CAPSULE (100 MG) BY MOUTH 2 TIMES DAILY 180 capsule 1     Ferrous Sulfate (CVS SLOW RELEASE IRON) 143 (45 FE) MG TBCR Take 1 tablet by mouth 2 times daily 180 tablet 1     insulin pen needle (B-D U/F) 31G X 5 MM Use once daily or as directed. 100 each prn     order for DME Test strips for pt's glucometer, brand as covered by insurance. Test twice daily or PRN. 1 Box prn     FLUoxetine (PROZAC) 20 MG capsule Take 20 mg by mouth daily        LORazepam (ATIVAN) 1 MG tablet Take 1 mg by mouth At Bedtime        modafinil (PROVIGIL) 200 MG tablet Take 0.5 tablets by mouth daily.       AMBIEN 10 MG OR TABS 1 TABLET DAILY AT BEDTIME       Allergies   Allergen Reactions     Augmentin GI Disturbance     Victoza Other (See Comments)     Abdominal pain     BP Readings from Last 3 Encounters:   08/18/17 130/78   02/10/17 132/86   01/30/17 141/80    Wt Readings from Last 3 Encounters:   08/18/17 286 lb (129.7 kg)   03/22/17 281 lb 12.8 oz (127.8 kg)   02/10/17 275 lb (124.7 kg)                  Labs reviewed in EPIC        Reviewed and updated as needed this visit by clinical staff       Reviewed and updated as needed this visit by Provider         ROS:  Constitutional, HEENT, cardiovascular, pulmonary, gi and gu systems are negative, except as otherwise noted.      OBJECTIVE:   /78  Pulse 70  Temp 98  F (36.7  C) (Oral)  Wt 286 lb (129.7 kg)  LMP 02/01/2010  SpO2 97%  Breastfeeding? No  BMI 47.24 kg/m2  Body mass index is 47.24 kg/(m^2).  GENERAL: alert, no distress and obese  RESP: lungs clear to auscultation - no rales, rhonchi or wheezes  CV: regular rate and rhythm, normal S1 S2, no S3 or S4, no murmur, click or rub, no peripheral edema and peripheral pulses strong  MS: no gross musculoskeletal defects noted, no edema    Diagnostic Test  Results:  pending    ASSESSMENT/PLAN:     1. Severe episode of recurrent major depressive disorder, without psychotic features (H)  Stable.  Continue current treatment plan and medications.  Patient to continue to follow-up with psychiatry.      2. Hyperparathyroidism (H)  Patient encouraged to continue follow-up with endocrinology.  Will obtain records.    3. Type 2 diabetes mellitus without complication, without long-term current use of insulin (H)  Stable.  Continue current treatment plan and medications.  Will obtain records.      4. Acquired hypothyroidism  Patient to continue to follow-up with endocrinology.  Will obtain records.    5. Acute chest pain  No acute changes on EKG, frequent PAC's noted.  Given patient's risk factors will obtain stress test.  Patient advised to seek emergency care for recurrent chest pain.  Patient verbalized understanding.   - CBC with platelets differential  - Comprehensive metabolic panel  - XR Chest 2 Views; Future  - NM Lexiscan stress test; Future    6. Palpitations  Will evaluate further.  - CBC with platelets differential  - Comprehensive metabolic panel  - Zio Patch Holter; Future    7. Hyperlipidemia LDL goal <100  Stable.  Continue current treatment plan and medications.      8. Essential hypertension  Stable.  Continue current treatment plan and medications.        FUTURE APPOINTMENTS:       - Follow-up visit pending test results.    JACKELINE Hare [facility]

## 2017-08-18 ENCOUNTER — RADIANT APPOINTMENT (OUTPATIENT)
Dept: GENERAL RADIOLOGY | Facility: CLINIC | Age: 57
End: 2017-08-18
Attending: NURSE PRACTITIONER
Payer: COMMERCIAL

## 2017-08-18 ENCOUNTER — OFFICE VISIT (OUTPATIENT)
Dept: INTERNAL MEDICINE | Facility: CLINIC | Age: 57
End: 2017-08-18
Payer: COMMERCIAL

## 2017-08-18 VITALS
TEMPERATURE: 98 F | SYSTOLIC BLOOD PRESSURE: 130 MMHG | HEART RATE: 70 BPM | DIASTOLIC BLOOD PRESSURE: 78 MMHG | BODY MASS INDEX: 47.24 KG/M2 | OXYGEN SATURATION: 97 % | WEIGHT: 286 LBS

## 2017-08-18 DIAGNOSIS — R07.9 ACUTE CHEST PAIN: ICD-10-CM

## 2017-08-18 DIAGNOSIS — F33.2 SEVERE EPISODE OF RECURRENT MAJOR DEPRESSIVE DISORDER, WITHOUT PSYCHOTIC FEATURES (H): Primary | ICD-10-CM

## 2017-08-18 DIAGNOSIS — E21.3 HYPERPARATHYROIDISM (H): ICD-10-CM

## 2017-08-18 DIAGNOSIS — I10 ESSENTIAL HYPERTENSION: ICD-10-CM

## 2017-08-18 DIAGNOSIS — E78.5 HYPERLIPIDEMIA LDL GOAL <100: ICD-10-CM

## 2017-08-18 DIAGNOSIS — E11.9 TYPE 2 DIABETES MELLITUS WITHOUT COMPLICATION, WITHOUT LONG-TERM CURRENT USE OF INSULIN (H): ICD-10-CM

## 2017-08-18 DIAGNOSIS — E03.9 ACQUIRED HYPOTHYROIDISM: ICD-10-CM

## 2017-08-18 DIAGNOSIS — R00.2 PALPITATIONS: ICD-10-CM

## 2017-08-18 LAB
ALBUMIN SERPL-MCNC: 4.2 G/DL (ref 3.4–5)
ALP SERPL-CCNC: 126 U/L (ref 40–150)
ALT SERPL W P-5'-P-CCNC: 44 U/L (ref 0–50)
ANION GAP SERPL CALCULATED.3IONS-SCNC: 1 MMOL/L (ref 3–14)
AST SERPL W P-5'-P-CCNC: 24 U/L (ref 0–45)
BASOPHILS # BLD AUTO: 0 10E9/L (ref 0–0.2)
BASOPHILS NFR BLD AUTO: 0.4 %
BILIRUB SERPL-MCNC: 0.4 MG/DL (ref 0.2–1.3)
BUN SERPL-MCNC: 6 MG/DL (ref 7–30)
CALCIUM SERPL-MCNC: 9.6 MG/DL (ref 8.5–10.1)
CHLORIDE SERPL-SCNC: 108 MMOL/L (ref 94–109)
CO2 SERPL-SCNC: 33 MMOL/L (ref 20–32)
CREAT SERPL-MCNC: 0.9 MG/DL (ref 0.52–1.04)
DIFFERENTIAL METHOD BLD: NORMAL
EOSINOPHIL # BLD AUTO: 0.1 10E9/L (ref 0–0.7)
EOSINOPHIL NFR BLD AUTO: 2.5 %
ERYTHROCYTE [DISTWIDTH] IN BLOOD BY AUTOMATED COUNT: 13.4 % (ref 10–15)
GFR SERPL CREATININE-BSD FRML MDRD: 64 ML/MIN/1.7M2
GLUCOSE SERPL-MCNC: 94 MG/DL (ref 70–99)
HCT VFR BLD AUTO: 43.9 % (ref 35–47)
HGB BLD-MCNC: 14.8 G/DL (ref 11.7–15.7)
LYMPHOCYTES # BLD AUTO: 2.1 10E9/L (ref 0.8–5.3)
LYMPHOCYTES NFR BLD AUTO: 38.3 %
MCH RBC QN AUTO: 31.2 PG (ref 26.5–33)
MCHC RBC AUTO-ENTMCNC: 33.7 G/DL (ref 31.5–36.5)
MCV RBC AUTO: 93 FL (ref 78–100)
MONOCYTES # BLD AUTO: 0.5 10E9/L (ref 0–1.3)
MONOCYTES NFR BLD AUTO: 8.3 %
NEUTROPHILS # BLD AUTO: 2.8 10E9/L (ref 1.6–8.3)
NEUTROPHILS NFR BLD AUTO: 50.5 %
PLATELET # BLD AUTO: 223 10E9/L (ref 150–450)
POTASSIUM SERPL-SCNC: 4.4 MMOL/L (ref 3.4–5.3)
PROT SERPL-MCNC: 7.4 G/DL (ref 6.8–8.8)
RBC # BLD AUTO: 4.74 10E12/L (ref 3.8–5.2)
SODIUM SERPL-SCNC: 142 MMOL/L (ref 133–144)
WBC # BLD AUTO: 5.6 10E9/L (ref 4–11)

## 2017-08-18 PROCEDURE — 99214 OFFICE O/P EST MOD 30 MIN: CPT | Performed by: NURSE PRACTITIONER

## 2017-08-18 PROCEDURE — 71020 XR CHEST 2 VW: CPT

## 2017-08-18 PROCEDURE — 85025 COMPLETE CBC W/AUTO DIFF WBC: CPT | Performed by: NURSE PRACTITIONER

## 2017-08-18 PROCEDURE — 80053 COMPREHEN METABOLIC PANEL: CPT | Performed by: NURSE PRACTITIONER

## 2017-08-18 PROCEDURE — 36415 COLL VENOUS BLD VENIPUNCTURE: CPT | Performed by: NURSE PRACTITIONER

## 2017-08-18 PROCEDURE — 0298T ZZC EXT ECG > 48HR TO 21 DAY REVIEW AND INTERPRETATN: CPT | Performed by: INTERNAL MEDICINE

## 2017-08-18 RX ORDER — GLUCOSAMINE HCL 500 MG
TABLET ORAL
COMMUNITY

## 2017-08-18 ASSESSMENT — PAIN SCALES - GENERAL: PAINLEVEL: NO PAIN (0)

## 2017-08-18 NOTE — NURSING NOTE
"Chief Complaint   Patient presents with     Thyroid Problem     PHQ-9/DAP     Recheck Medication     DM     Chest Pain     EKG two days ago     Palpitations       Initial /78  Pulse 70  Temp 98  F (36.7  C) (Oral)  Wt 286 lb (129.7 kg)  LMP 02/01/2010  SpO2 97%  Breastfeeding? No  BMI 47.24 kg/m2 Estimated body mass index is 47.24 kg/(m^2) as calculated from the following:    Height as of 3/22/17: 5' 5.24\" (1.657 m).    Weight as of this encounter: 286 lb (129.7 kg).  Medication Reconciliation: complete   SERJIO/MA      "

## 2017-08-18 NOTE — PROGRESS NOTES
Dear Gladys,    Your recent test results are attached.      Normal chest x-ray.    If you have any questions please feel free to contact [phone] or myself via [employer].    Sincerely,  Vania Monroe, CNP

## 2017-08-18 NOTE — PATIENT INSTRUCTIONS
For scheduling at [facility] ([facility] call [phone] or [phone]       [facility]    If you have any questions regarding to your visit please contact your care team:     Team Pink:   Clinic Hours Telephone Number   Internal Medicine:  Dr. Octavia Monroe, NP       7am-7pm  Monday - Thursday   7am-5pm  Fridays  [phone]  (Appointment scheduling available 24/7)    Questions about your visit?  Team Line  [phone]   Urgent Care - [place] and [place] - 11am-9pm Monday-Friday Saturday-Sunday- 9am-5pm   [place] - 5pm-9pm Monday-Friday Saturday-Sunday- 9am-5pm  [phone] - [place]   [phone] - [place]       What options do I have for visits at the clinic other than the traditional office visit?  To expand how we care for you, many of our providers are utilizing electronic visits (e-visits) and telephone visits, when medically appropriate, for interactions with their patients rather than a visit in the clinic.   We also offer nurse visits for many medical concerns. Just like any other service, we will bill your insurance company for this type of visit based on time spent on the phone with your provider. Not all insurance companies cover these visits. Please check with your medical insurance if this type of visit is covered. You will be responsible for any charges that are not paid by your insurance.      E-visits via [email or web address]:  generally incur a $35.00 fee.  Telephone visits:  Time spent on the phone: *charged based on time that is spent on the phone in increments of 10 minutes. Estimated cost:   5-10 mins $30.00   11-20 mins. $59.00   21-30 mins. $85.00   Use [employer] (secure email communication and access to your chart) to send your primary care provider a message or make an appointment. Ask someone on your Team how to sign up for [email or web address].    For a Price Quote  for your services, please call our Consumer Price Line at [phone].    As always, Thank you for trusting us with your health care needs!    SERJIO/MA

## 2017-08-18 NOTE — MR AVS SNAPSHOT
After Visit Summary   8/18/2017    Gladys Singh    MRN: [id number]           Patient Information     Date Of Birth          1960        Visit Information        Provider Department      8/18/2017 9:20 AM Vania Monroe APRN [facility]        Today's Diagnoses     Severe episode of recurrent major depressive disorder, without psychotic features (H)    -  1    Hyperparathyroidism (H)        Type 2 diabetes mellitus without complication, without long-term current use of insulin (H)        Acquired hypothyroidism        Acute chest pain        Palpitations        Hyperlipidemia LDL goal <100        Essential hypertension          Care Instructions    For scheduling at [facility] ([facility], [facility]), call [phone] or [phone]       [facility]    If you have any questions regarding to your visit please contact your care team:     Team Pink:   Clinic Hours Telephone Number   Internal Medicine:  Dr. Octavia Monroe, NP       7am-7pm  Monday - Thursday   7am-5pm  Fridays  [phone]  (Appointment scheduling available 24/7)    Questions about your visit?  Team Line  [phone]   Urgent Care - [place] and [place] - 11am-9pm Monday-Friday Saturday-Sunday- 9am-5pm   [place] - 5pm-9pm Monday-Friday Saturday-Sunday- 9am-5pm  [phone] - [place]   [phone] - [place]       What options do I have for visits at the clinic other than the traditional office visit?  To expand how we care for you, many of our providers are utilizing electronic visits (e-visits) and telephone visits, when medically appropriate, for interactions with their patients rather than a visit in the clinic.   We also offer nurse visits for many medical concerns. Just like any other service, we will bill your insurance company for this type of visit based on  time spent on the phone with your provider. Not all insurance companies cover these visits. Please check with your medical insurance if this type of visit is covered. You will be responsible for any charges that are not paid by your insurance.      E-visits via [employer]:  generally incur a $35.00 fee.  Telephone visits:  Time spent on the phone: *charged based on time that is spent on the phone in increments of 10 minutes. Estimated cost:   5-10 mins $30.00   11-20 mins. $59.00   21-30 mins. $85.00   Use [employer] (secure email communication and access to your chart) to send your primary care provider a message or make an appointment. Ask someone on your Team how to sign up for [employer].    For a Price Quote for your services, please call our Enable In[employer] Line at [phone].    As always, Thank you for trusting us with your health care needs!    SERJIO/MA            Follow-ups after your visit        Future tests that were ordered for you today     Open Future Orders        Priority Expected Expires Ordered    XR Chest 2 Views Routine 8/18/2017 8/18/2018 8/18/2017    NM Lexiscan stress test Routine  8/18/2018 8/18/2017    Zio Patch Holter Routine  10/2/2017 8/18/2017            Who to contact     If you have questions or need follow up information about today's clinic visit or your schedule please contact [facility] directly at [phone].  Normal or non-critical lab and imaging results will be communicated to you by [employer], letter or phone within 4 business days after the clinic has received the results. If you do not hear from us within 7 days, please contact the clinic through [employer] or phone. If you have a critical or abnormal lab result, we will notify you by phone as soon as possible.  Submit refill requests through [employer] or call your pharmacy and they will forward the refill request to us. Please allow 3 business days for your refill to be completed.          Additional Information  About Your Visit        [employer] Information     [employer] gives you secure access to your electronic health record. If you see a primary care provider, you can also send messages to your care team and make appointments. If you have questions, please call your primary care clinic.  If you do not have a primary care provider, please call [phone] and they will assist you.        Care EveryWhere ID     This is your Care EveryWhere ID. This could be used by other organizations to access your [place] medical records  [id number]        Your Vitals Were     Pulse Temperature Last Period Pulse Oximetry Breastfeeding? BMI (Body Mass Index)    70 98  F (36.7  C) (Oral) 02/01/2010 97% No 47.24 kg/m2       Blood Pressure from Last 3 Encounters:   08/18/17 130/78   02/10/17 132/86   01/30/17 141/80    Weight from Last 3 Encounters:   08/18/17 286 lb (129.7 kg)   03/22/17 281 lb 12.8 oz (127.8 kg)   02/10/17 275 lb (124.7 kg)              We Performed the Following     CBC with platelets differential     Comprehensive metabolic panel        Primary Care Provider Office Phone # Fax #    Vania Roxy Monroe, JACKELINE [facility] [phone] [facility] [address]        Equal Access to Services     OSCAR URBANO : Hadii aad ku hadasho Soomaali, waaxda luqadaha, qaybta kaalmada adeegyada, waxay idiin haynellien neelima arguello . So [facility] [phone].    ATENCIÓN: Si habla español, tiene a eason disposición servicios gratuitos de asistencia lingüística. Llame al [phone].    We comply with applicable federal civil rights laws and [place] laws. We do not discriminate on the basis of race, color, national origin, age, disability sex, sexual orientation or gender identity.            Thank you!     Thank you for choosing [facility]  for your care. Our goal is always to provide you with excellent care. Hearing back from our patients is one way we can continue to improve our services.  Please take a few minutes to complete the written survey that you may receive in the mail after your visit with us. Thank you!             Your Updated Medication List - Protect others around you: Learn how to safely use, store and throw away your medicines at www.disposemymeds.org.          This list is accurate as of: 8/18/17 10:02 AM.  Always use your most recent med list.                   Brand Name Dispense Instructions for use Diagnosis    AMBIEN 10 MG tablet   Generic drug:  zolpidem      1 TABLET DAILY AT BEDTIME        aspirin 81 MG EC tablet     100 tablet    TAKE 1 TABLET (81 MG) BY MOUTH DAILY    Benign essential hypertension       * blood glucose lancets standard    no brand specified    1 Box    Use to test blood sugar one times daily or as directed.    Type 2 diabetes mellitus without complication, without long-term current use of insulin (H)       * blood glucose lancets standard    no brand specified    1 Box    Use to test blood sugar one times daily or as directed.    Type 2 diabetes mellitus without complication, without long-term current use of insulin (H)       blood glucose monitoring lancets     1 Box    TEST ONCE DAILY OR AS NEEDED *DISPENSE WHATEVER IS COVERED BY INS*    Type 2 diabetes mellitus without complication, without long-term current use of insulin (H)       * blood glucose monitoring meter device kit    no brand specified    1 kit    Use to test blood sugar one times daily or as directed.    Type 2 diabetes mellitus without complication, without long-term current use of insulin (H)       * blood glucose monitoring meter device kit    no brand specified    1 kit    Use to test blood sugar one times daily or as directed.    Type 2 diabetes mellitus without complication, without long-term current use of insulin (H)       * blood glucose monitoring test strip    no brand specified    100 strip    Use to test blood sugars one times daily or as directed    Type 2 diabetes mellitus  without complication, without long-term current use of insulin (H)       * blood glucose monitoring test strip    no brand specified    100 strip    Use to test blood sugars one times daily or as directed    Type 2 diabetes mellitus without complication, without long-term current use of insulin (H)       BUSPAR PO      Take 30 mg by mouth 2 times daily        BYDUREON 2 MG pen   Generic drug:  exenatide ER      Inject 2 mg Subcutaneous every 7 days        calcium carbonate 1250 (500 CA) MG Tabs tablet    OSCAL 500    270 tablet    TAKE 1 TABLET BY MOUTH. THREE TIMES DAILY.    Elevated PTHrP level (H)       CVS SPECTRAVITE ULTRA WOMEN Tabs     90 tablet    TAKE 1 TABLET BY MOUTH DAILY    Constipation, chronic       CVS vitamin  B12 1000 MCG Tabs   Generic drug:  cyanocobalamin     90 tablet    TAKE 1 TABLET (1,000 MCG) BY MOUTH DAILY    Vitamin B12 deficiency (non anemic)       docusate sodium 100 MG capsule    COLACE    180 capsule    TAKE 1 CAPSULE (100 MG) BY MOUTH 2 TIMES DAILY    Constipation, unspecified constipation type       Ferrous Sulfate 143 (45 FE) MG Tbcr    CVS SLOW RELEASE IRON    180 tablet    Take 1 tablet by mouth 2 times daily    Iron deficiency       furosemide 40 MG tablet    LASIX    90 tablet    TAKE 1 TABLET (40 MG) BY MOUTH DAILY    Pedal edema       insulin pen needle 31G X 5 MM    B-D U/F    100 each    Use once daily or as directed.    Type 2 diabetes mellitus without complication (H)       levothyroxine 75 MCG tablet    SYNTHROID/LEVOTHROID    90 tablet    Take 1 tablet (75 mcg) by mouth daily    Acquired hypothyroidism, Vitamin D deficiency, Hyperparathyroidism (H), Abdominal pain, epigastric       lisinopril 10 MG tablet    PRINIVIL/ZESTRIL    90 tablet    TAKE 1 TABLET (10 MG) BY MOUTH DAILY    Type 2 diabetes mellitus without complication, without long-term current use of insulin (H), Essential hypertension with goal blood pressure less than 140/90       LORazepam 1 MG tablet     ATIVAN     Take 1 mg by mouth At Bedtime        omeprazole 20 MG CR capsule    priLOSEC    180 capsule    Take 1 capsule (20 mg) by mouth 2 times daily    Hiatal hernia       order for DME     1 Box    Test strips for pt's glucometer, brand as covered by insurance. Test twice daily or PRN.    Type 2 diabetes mellitus without complication (H)       potassium chloride 20 MEQ CR tablet   Generic drug:  potassium chloride SA     180 tablet    TAKE 1 TABLET (20 MEQ) BY MOUTH 2 TIMES DAILY    Diuresis       PROVIGIL 200 MG tablet   Generic drug:  modafinil      Take 0.5 tablets by mouth daily.        PROZAC 20 MG capsule   Generic drug:  FLUoxetine      Take 20 mg by mouth daily    New daily persistent headache       ranitidine 150 MG tablet    ZANTAC    60 tablet    TAKE 1 TABLET (150 MG) BY MOUTH 2 TIMES DAILY    Gastroesophageal reflux disease, esophagitis presence not specified       simvastatin 20 MG tablet    ZOCOR    30 tablet    TAKE 1 TABLET (20 MG) BY MOUTH AT BEDTIME NEED LABS    Hyperlipidemia LDL goal <100       vitamin D 18131 UNIT capsule    ERGOCALCIFEROL    8 capsule    TAKE 1 CAPSULE BY MOUTH ONCE WEEKLY.    Vitamin D deficiency, Hyperparathyroidism (H), Acquired hypothyroidism, Abdominal pain, epigastric       Vitamin D3 3000 UNITS Tabs           * Notice:  This list has 6 medication(s) that are the same as other medications prescribed for you. Read the directions carefully, and ask your doctor or other care provider to review them with you.

## 2017-08-18 NOTE — LETTER
[address] [place] [address]    August 18, 2017    Gladysdunia Singh  [address]  [place] [address]          Dear Gladys,    Normal chest x-ray.     If you have any questions please feel free to contact [phone] or myself via [employer]    Enclosed is a copy of your results.     Results for orders placed or performed in visit on 08/18/17   XR Chest 2 Views    Narrative    XR CHEST 2 VW   8/18/2017 10:30 AM     HISTORY: Chest pain.    COMPARISON: None.    FINDINGS: Heart size normal. Lungs clear.      Impression    IMPRESSION: Negative.    MARILY PIKE MD       If you have any questions or concerns, please call myself or my nurse at [phone].      Sincerely,        Vania Monroe, NAIMA/SANTIAGO

## 2017-08-21 NOTE — PROGRESS NOTES
Dear Gladys,    Your recent test results are attached.      Normal kidney function and electrolytes.  No anemia.      If you have any questions please feel free to contact [phone] or myself via [employer].    Sincerely,  Vania Monroe, CNP

## 2017-08-30 ENCOUNTER — RADIANT APPOINTMENT (OUTPATIENT)
Dept: NUCLEAR MEDICINE | Facility: CLINIC | Age: 57
End: 2017-08-30
Attending: NURSE PRACTITIONER
Payer: COMMERCIAL

## 2017-08-30 ENCOUNTER — OFFICE VISIT (OUTPATIENT)
Dept: CARDIOLOGY | Facility: CLINIC | Age: 57
End: 2017-08-30
Attending: NURSE PRACTITIONER
Payer: COMMERCIAL

## 2017-08-30 VITALS — SYSTOLIC BLOOD PRESSURE: 117 MMHG | HEART RATE: 62 BPM | DIASTOLIC BLOOD PRESSURE: 82 MMHG

## 2017-08-30 DIAGNOSIS — R00.2 PALPITATIONS: ICD-10-CM

## 2017-08-30 DIAGNOSIS — R07.9 ACUTE CHEST PAIN: ICD-10-CM

## 2017-08-30 DIAGNOSIS — R07.9 ACUTE CHEST PAIN: Primary | ICD-10-CM

## 2017-08-30 PROCEDURE — A9502 TC99M TETROFOSMIN: HCPCS | Performed by: INTERNAL MEDICINE

## 2017-08-30 PROCEDURE — 0296T ZIO PATCH HOLTER: CPT | Performed by: NURSE PRACTITIONER

## 2017-08-30 PROCEDURE — 93016 CV STRESS TEST SUPVJ ONLY: CPT | Performed by: FAMILY MEDICINE

## 2017-08-30 PROCEDURE — 93017 CV STRESS TEST TRACING ONLY: CPT | Performed by: FAMILY MEDICINE

## 2017-08-30 PROCEDURE — 0298T ZZC EXT ECG > 48HR TO 21 DAY REVIEW AND INTERPRETATN: CPT | Performed by: NURSE PRACTITIONER

## 2017-08-30 PROCEDURE — 93018 CV STRESS TEST I&R ONLY: CPT | Performed by: INTERNAL MEDICINE

## 2017-08-30 PROCEDURE — 78452 HT MUSCLE IMAGE SPECT MULT: CPT | Performed by: INTERNAL MEDICINE

## 2017-08-30 RX ORDER — REGADENOSON 0.08 MG/ML
0.4 INJECTION, SOLUTION INTRAVENOUS ONCE
Status: COMPLETED | OUTPATIENT
Start: 2017-08-30 | End: 2017-08-30

## 2017-08-30 RX ADMIN — REGADENOSON 0.4 MG: 0.08 INJECTION, SOLUTION INTRAVENOUS at 10:48

## 2017-08-30 NOTE — LETTER
[address] [place] [address]    August 31, 2017    Gladysdunia Dolanie Francisco  [address]  [place] [address]          Dear Gladys,    Normal stress test.     If you have any questions please feel free to contact [phone] or myself via [employer]    Enclosed is a copy of your results.     Results for orders placed or performed in visit on 08/30/17   NM Lexiscan stress test    Narrative    INDICATION:  Evaluation of chest pain.     PROTOCOL:    Rest and stress myocardial perfusion imaging was performed using 11  and 37 mCi of Tc-99m tetrafosmin intravenously. Pharmacological stress  was performed with 0.4 mg of regadenoson intravenously. The EKG showed  normal sinus rhythm at rest with diffuse, nonspecific ST-T wave  changes. No significant abnormalities were noted with infusion of  regadenoson.    FINDINGS:  1. Overall quality of the study: Reduced due to body habitus.   2. Left ventricular cavity is normal on the rest and stress studies.  3. SPECT images demonstrate overall homogeneous radiotracer uptake of  the myocardium on both stress and rest images within the limitations  of poor image quality.These results suggest a low post-scan likelihood  of obstructive coronary artery disease. The summed stress score is 0.   4. Gated SPECT images reveal normal left ventricular systolic function  with without regional wall motion abnormalities. The left ventricular  ejection fraction is 71%. The left ventricular end-diastolic volume is  99 mL, the left ventricular end-systolic volume is 29 mL.       Impression    IMPRESSION:  1. Normal myocardial SPECT study within the limitations of poor image  quality. The summed stress score is  0. A summed stress score of <4 is  associated with an annual event rate of 0.8% and 0.9% for myocardial  infarction and cardiac death, respectively (chamovitch.  Circulation  1998;98:535-43).  2. Normal left ventricular systolic function with a left ventricular  ejection fraction of  71%.   3. No significant perfusion abnormalities.   4. No prior study available for comparison.    FEI SMITH MD       If you have any questions or concerns, please call myself or my nurse at [phone].      Sincerely,        Vania Monroe APRN CNP /SANTIAGO

## 2017-08-30 NOTE — PROGRESS NOTES
Dear Gladys,    Your recent test results are attached.      Normal stress test.    If you have any questions please feel free to contact [phone] or myself via [employer].    Sincerely,  Vania Mnoroe, CNP

## 2017-08-30 NOTE — MR AVS SNAPSHOT
MRN:[id number]                      After Visit Summary   8/30/2017    Gladys Singh    MRN: [id number]           Visit Information        Provider Department      8/30/2017 10:45 AM MG CV TECH; MG PC CARD; MG IMAGING NURSE; MG STRESS RM [facility]        Your next 10 appointments already scheduled     Aug 30, 2017 11:15 AM CDT   NM MPI WITH LEXISCAN with MGNM1   [facility] ([facility])    [id number] [place] [id number] [phone]           For a ONE day exam: Allow 3-4 hours for test. For a TWO day exam: Allow 2 hours PER day for test.  You may need to stop some medicines before the test. Follow your doctor s orders. - If you take a beta blocker: Follow your doctor s specific instructions on taking it prior to and on the day of your exam. - If you take Aggrenox or dipyridamole (Persantine, Permole), stop taking it 48 hours before your test. - If you take Viagra, Cialis or Levitra, stop taking it 48 hours before your test. - If you take theophylline or aminophylline, stop taking it 12 hours before your test.  For patients with diabetes: - If you take insulin, call your diabetes care team. Ask if you should take a 1/2 dose the morning of your test. - If you take diabetes medicine by mouth, don t take it on the morning of your test. Bring it with you to take after the test. (If you have questions, call your diabetes care team.)  Do not take nitrates on the day of your test. Do not wear your Nitro-Patch.  Stop all caffeine 12 hours before the test. This includes coffee, tea, soda pop, chocolate and certain medicines (such as Anacin, Excedrin and NoDoz). Also avoid decaf coffee and tea, as these contain small amounts of caffeine.  No alcohol, smoking or other tobacco for 12 hours before the test.  Stop eating 3 hours before the test. You may drink water.  Please wear a loose two-piece outfit. If you will have an exercise  test, bring rubber-soled walking shoes.  When you arrive, please tell us if you: - Have diabetes - Are breastfeeding - May be pregnant - Have a pacemaker of ICD (implantable defibrillator).  Please call your Imaging Department at your exam site with any questions.              [employer] Information     [employer] gives you secure access to your electronic health record. If you see a primary care provider, you can also send messages to your care team and make appointments. If you have questions, please call your primary care clinic.  If you do not have a primary care provider, please call [phone] and they will assist you.      [employer] is an electronic gateway that provides easy, online access to your medical records. With [employer], you can request a clinic appointment, read your test results, renew a prescription or communicate with your care team.     To access your existing account, please contact your [facility] or call [phone] for assistance.        Care EveryWhere ID     This is your Care EveryWhere ID. This could be used by other organizations to access your [place] medical records  [id number]        Equal Access to Services     OSCAR URBANO : [place]ford [place], waamarilysda venecia, qatiffanie baxter, chelsie mata. So [facility] [phone].    ATENCIÓN: Si habla español, tiene a eason disposición servicios gratuitos de asistencia lingüística. Llame al [phone].    We comply with applicable federal civil rights laws and [place] laws. We do not discriminate on the basis of race, color, national origin, age, disability sex, sexual orientation or gender identity.

## 2017-08-30 NOTE — PROGRESS NOTES
IV was started in the left AC with a 22g Jelco catheter and resting images were completed.      Patient's IV site was injected by RN with 0.4mg's of Lexiscan (Regadenoson) over a 15 second period, followed by a 5-mL saline flush.  The Nuclear Tech injected the Myoview tracer through the IV site 20 seconds later.      Patient offered C/O: short of breath      Total dose of Lexiscan was 0.4mg's.   Lexiscan NDC# 4991-1614-53   Total dose of Aminophylline was 0 mg  Aminophylline NDC# 1507-1433-93  Total dose of Metoprolol was 0 mg  Metoprolol NDC#  [address]-10  Total dose of Labetalol was 0 mg   Labetalol NDC# 8176-0412-77    Dr. Kent provided supervision of the tests performed today.

## 2017-08-31 NOTE — PROGRESS NOTES
Please call patient-    Her HOlter monitor shows she is having intermittent runs of an arrhythmia called atrial fibrillation.  Please have her schedule a follow-up appointment to be seen to discuss further treatment tomorrow if possible.    Thanks,  Vania Monroe, CNP

## 2017-09-01 ENCOUNTER — OFFICE VISIT (OUTPATIENT)
Dept: INTERNAL MEDICINE | Facility: CLINIC | Age: 57
End: 2017-09-01
Payer: COMMERCIAL

## 2017-09-01 VITALS
OXYGEN SATURATION: 96 % | WEIGHT: 283.4 LBS | TEMPERATURE: 96.8 F | HEART RATE: 87 BPM | SYSTOLIC BLOOD PRESSURE: 100 MMHG | DIASTOLIC BLOOD PRESSURE: 70 MMHG | BODY MASS INDEX: 46.81 KG/M2

## 2017-09-01 DIAGNOSIS — I47.10 SVT (SUPRAVENTRICULAR TACHYCARDIA) (H): ICD-10-CM

## 2017-09-01 DIAGNOSIS — E61.1 IRON DEFICIENCY: ICD-10-CM

## 2017-09-01 DIAGNOSIS — E78.5 HYPERLIPIDEMIA LDL GOAL <100: ICD-10-CM

## 2017-09-01 DIAGNOSIS — E53.8 VITAMIN B12 DEFICIENCY (NON ANEMIC): ICD-10-CM

## 2017-09-01 DIAGNOSIS — I48.0 PAROXYSMAL ATRIAL FIBRILLATION (H): Primary | ICD-10-CM

## 2017-09-01 DIAGNOSIS — Z23 NEED FOR PROPHYLACTIC VACCINATION AND INOCULATION AGAINST INFLUENZA: ICD-10-CM

## 2017-09-01 LAB
INR PPP: 0.93 (ref 0.86–1.14)
LDLC SERPL DIRECT ASSAY-MCNC: 85 MG/DL

## 2017-09-01 PROCEDURE — 99214 OFFICE O/P EST MOD 30 MIN: CPT | Mod: 25 | Performed by: NURSE PRACTITIONER

## 2017-09-01 PROCEDURE — 90686 IIV4 VACC NO PRSV 0.5 ML IM: CPT | Performed by: NURSE PRACTITIONER

## 2017-09-01 PROCEDURE — 36415 COLL VENOUS BLD VENIPUNCTURE: CPT | Performed by: NURSE PRACTITIONER

## 2017-09-01 PROCEDURE — 85610 PROTHROMBIN TIME: CPT | Performed by: NURSE PRACTITIONER

## 2017-09-01 PROCEDURE — 90471 IMMUNIZATION ADMIN: CPT | Performed by: NURSE PRACTITIONER

## 2017-09-01 PROCEDURE — 83721 ASSAY OF BLOOD LIPOPROTEIN: CPT | Performed by: NURSE PRACTITIONER

## 2017-09-01 RX ORDER — METOPROLOL SUCCINATE 25 MG/1
12.5 TABLET, EXTENDED RELEASE ORAL DAILY
Qty: 15 TABLET | Refills: 1 | Status: SHIPPED | OUTPATIENT
Start: 2017-09-01 | End: 2017-09-08

## 2017-09-01 RX ORDER — DABIGATRAN ETEXILATE 150 MG/1
CAPSULE ORAL
Qty: 60 CAPSULE | Refills: 5 | Status: SHIPPED | OUTPATIENT
Start: 2017-09-01 | End: 2018-02-06

## 2017-09-01 RX ORDER — IBUPROFEN 200 MG
CAPSULE ORAL
Qty: 270 TABLET | Refills: 0 | Status: CANCELLED | OUTPATIENT
Start: 2017-09-01

## 2017-09-01 RX ORDER — DOCUSATE SODIUM 100 MG/1
CAPSULE, LIQUID FILLED ORAL
Qty: 180 CAPSULE | Refills: 1 | Status: CANCELLED | OUTPATIENT
Start: 2017-09-01

## 2017-09-01 RX ORDER — SIMVASTATIN 20 MG
TABLET ORAL
Qty: 90 TABLET | Refills: 1 | Status: SHIPPED | OUTPATIENT
Start: 2017-09-01 | End: 2018-03-01

## 2017-09-01 RX ORDER — ESCITALOPRAM OXALATE 20 MG/1
20 TABLET ORAL DAILY
Qty: 90 TABLET | Refills: 1 | COMMUNITY
Start: 2017-09-01 | End: 2018-03-28

## 2017-09-01 RX ORDER — LEVOTHYROXINE SODIUM 75 UG/1
75 TABLET ORAL DAILY
Qty: 90 TABLET | Refills: 3 | Status: CANCELLED | OUTPATIENT
Start: 2017-09-01

## 2017-09-01 RX ORDER — LISINOPRIL 10 MG/1
TABLET ORAL
Qty: 90 TABLET | Refills: 1 | Status: CANCELLED | OUTPATIENT
Start: 2017-09-01

## 2017-09-01 RX ORDER — FUROSEMIDE 40 MG
TABLET ORAL
Qty: 90 TABLET | Refills: 0 | Status: CANCELLED | OUTPATIENT
Start: 2017-09-01

## 2017-09-01 RX ORDER — POTASSIUM CHLORIDE 1500 MG/1
TABLET, EXTENDED RELEASE ORAL
Qty: 180 TABLET | Refills: 0 | Status: CANCELLED | OUTPATIENT
Start: 2017-09-01

## 2017-09-01 ASSESSMENT — PATIENT HEALTH QUESTIONNAIRE - PHQ9: SUM OF ALL RESPONSES TO PHQ QUESTIONS 1-9: 3

## 2017-09-01 ASSESSMENT — PAIN SCALES - GENERAL: PAINLEVEL: NO PAIN (0)

## 2017-09-01 NOTE — PROGRESS NOTES
SUBJECTIVE:   Gladys Singh is a 57 year old female who presents to clinic today for the following health issues:    Patient presents with:  Results: Discuss holter monitor results  Flu Shot    Patient is here to discuss Holter monitor results, which showed paroxysmal afib with rate of  and sustained for nearly 3 min and supraventricular tachycardia with rate up to 158, with longest run of 14 beats.  Patient notes continued palpitations.  Recent stress test was negative for ischemia.  Patient feels well overall.  She had feel that her anxiety was worsening and was the cause of her palpitations.      Problem list and histories reviewed & adjusted, as indicated.  Additional history: as documented    Patient Active Problem List   Diagnosis     GERD (gastroesophageal reflux disease)     Pedal edema     Major depressive disorder, recurrent episode, severe (H)     Fatigue     Hypothyroidism     Anxiety     Vegetarian     KHUSHI (obstructive sleep apnea)     Hyperparathyroidism (H)     Iron deficiency     Hyperlipidemia LDL goal <100     Papanicolaou smear of cervix with atypical squamous cells cannot exclude high grade squamous intraepithelial lesion (ASC-H)     Constipation, chronic     Hiatal hernia     Thumb pain     New daily persistent headache     Left knee pain     Morbid obesity due to excess calories (H)     Iliotibial band tendinitis of left side     Essential hypertension     Type 2 diabetes mellitus without complication, without long-term current use of insulin (H)     H/O total knee replacement, bilateral     Past Surgical History:   Procedure Laterality Date     APPENDECTOMY       ARTHROSCOPY KNEE RT/LT       C  DELIVERY ONLY      x 2     C TOTAL KNEE ARTHROPLASTY  2004    bilateral     COLONOSCOPY  , ,     early Crohn's     HC COLP CERVIX/UPPER VAGINA W BX CERVIX  ,     neg     HC ESOPHAGOSCOPY, DIAGNOSTIC  , ,     benign, fungal infection     HC REPAIR  OF NASAL SEPTUM       HERNIA REPAIR, UMBILICAL       PE TUBES       TONSILLECTOMY & ADENOIDECTOMY       total knee Bilateral 2004    BRAD Mikael Prasad MD       Social History   Substance Use Topics     Smoking status: Former Smoker     Quit date: 1/1/1994     Smokeless tobacco: Never Used     Alcohol use No     Family History   Problem Relation Age of Onset     DIABETES Mother      CANCER Mother      endometrial     DIABETES Father      Prostate Cancer Father      C.A.D. Father      DIABETES Maternal Grandmother      CEREBROVASCULAR DISEASE Paternal Grandfather      Cancer - colorectal Sister      Neurologic Disorder Daughter      anxiety     Neurologic Disorder Daughter      anxiety, bipolar         Current Outpatient Prescriptions   Medication Sig Dispense Refill     simvastatin (ZOCOR) 20 MG tablet TAKE 1 TABLET (20 MG) BY MOUTH AT BEDTIME NEED LABS 90 tablet 1     cyanocobalamin (CVS VITAMIN  B12) 1000 MCG TABS TAKE 1 TABLET (1,000 MCG) BY MOUTH DAILY 90 tablet 1     Ferrous Sulfate (CVS SLOW RELEASE IRON) 143 (45 FE) MG TBCR Take 1 tablet by mouth 2 times daily 180 tablet 1     dabigatran ANTICOAGULANT (PRADAXA) 150 MG capsule Take 1 capsule (150 mg) by mouth twice daily. Store in original 's bottle or blister pack; use within 120 days of opening. 60 capsule 5     escitalopram (LEXAPRO) 20 MG tablet Take 1 tablet (20 mg) by mouth daily 90 tablet 1     ASPIRIN NOT PRESCRIBED (INTENTIONAL) Please choose reason not prescribed, below 0 each 0     metoprolol (TOPROL-XL) 25 MG 24 hr tablet Take 0.5 tablets (12.5 mg) by mouth daily 15 tablet 1     BusPIRone HCl (BUSPAR PO) Take 30 mg by mouth 2 times daily       Cholecalciferol (VITAMIN D3) 3000 UNITS TABS        exenatide ER (BYDUREON) 2 MG pen Inject 2 mg Subcutaneous every 7 days       POTASSIUM CHLORIDE 20 MEQ CR tablet TAKE 1 TABLET (20 MEQ) BY MOUTH 2 TIMES DAILY 180 tablet 0     vitamin D (ERGOCALCIFEROL) 83832 UNIT capsule TAKE 1 CAPSULE BY MOUTH ONCE  WEEKLY. 8 capsule 1     furosemide (LASIX) 40 MG tablet TAKE 1 TABLET (40 MG) BY MOUTH DAILY 90 tablet 0     blood glucose monitoring (SAMUEL MICROLET) lancets TEST ONCE DAILY OR AS NEEDED *DISPENSE WHATEVER IS COVERED BY INS* 1 Box 3     calcium carbonate (OSCAL 500) 1250 (500 CA) MG TABS tablet TAKE 1 TABLET BY MOUTH. THREE TIMES DAILY. 270 tablet 0     lisinopril (PRINIVIL/ZESTRIL) 10 MG tablet TAKE 1 TABLET (10 MG) BY MOUTH DAILY 90 tablet 1     ranitidine (ZANTAC) 150 MG tablet TAKE 1 TABLET (150 MG) BY MOUTH 2 TIMES DAILY 60 tablet 5     Multiple Vitamins-Minerals (CVS SPECTRAVITE ULTRA WOMEN) TABS TAKE 1 TABLET BY MOUTH DAILY 90 tablet 3     blood glucose monitoring (NO BRAND SPECIFIED) test strip Use to test blood sugars one times daily or as directed 100 strip 3     blood glucose monitoring (NO BRAND SPECIFIED) meter device kit Use to test blood sugar one times daily or as directed. 1 kit 0     blood glucose (NO BRAND SPECIFIED) lancets standard Use to test blood sugar one times daily or as directed. 1 Box 3     omeprazole (PRILOSEC) 20 MG CR capsule Take 1 capsule (20 mg) by mouth 2 times daily 180 capsule 3     levothyroxine (SYNTHROID/LEVOTHROID) 75 MCG tablet Take 1 tablet (75 mcg) by mouth daily 90 tablet 3     blood glucose monitoring (NO BRAND SPECIFIED) meter device kit Use to test blood sugar one times daily or as directed. 1 kit 0     blood glucose (NO BRAND SPECIFIED) lancets standard Use to test blood sugar one times daily or as directed. 1 Box 0     blood glucose monitoring (NO BRAND SPECIFIED) test strip Use to test blood sugars one times daily or as directed 100 strip 3     docusate sodium (COLACE) 100 MG capsule TAKE 1 CAPSULE (100 MG) BY MOUTH 2 TIMES DAILY 180 capsule 1     insulin pen needle (B-D U/F) 31G X 5 MM Use once daily or as directed. 100 each prn     order for DME Test strips for pt's glucometer, brand as covered by insurance. Test twice daily or PRN. 1 Box prn     LORazepam  (ATIVAN) 1 MG tablet Take 1 mg by mouth At Bedtime        modafinil (PROVIGIL) 200 MG tablet Take 0.5 tablets by mouth daily.       AMBIEN 10 MG OR TABS 1 TABLET DAILY AT BEDTIME       Allergies   Allergen Reactions     Augmentin GI Disturbance     Victoza Other (See Comments)     Abdominal pain     BP Readings from Last 3 Encounters:   09/01/17 100/70   08/30/17 117/82   08/18/17 130/78    Wt Readings from Last 3 Encounters:   09/01/17 283 lb 6.4 oz (128.5 kg)   08/18/17 286 lb (129.7 kg)   03/22/17 281 lb 12.8 oz (127.8 kg)                  Labs reviewed in EPIC        Reviewed and updated as needed this visit by clinical staffTobacco  Allergies  Meds  Med Hx  Surg Hx  Fam Hx  Soc Hx      Reviewed and updated as needed this visit by Provider         ROS:  Constitutional, HEENT, cardiovascular, pulmonary, gi and gu systems are negative, except as otherwise noted.      OBJECTIVE:   /70 (BP Location: Left arm, Cuff Size: Adult Large)  Pulse 87  Temp 96.8  F (36  C) (Oral)  Wt 283 lb 6.4 oz (128.5 kg)  LMP 02/01/2010  SpO2 96%  Breastfeeding? No  BMI 46.81 kg/m2  Body mass index is 46.81 kg/(m^2).  GENERAL: alert, no distress and obese  RESP: lungs clear to auscultation - no rales, rhonchi or wheezes  CV: regular rate and rhythm, normal S1 S2, no S3 or S4, no murmur, click or rub, no peripheral edema and peripheral pulses strong  MS: no gross musculoskeletal defects noted, no edema    Diagnostic Test Results:  pending    ASSESSMENT/PLAN:     1. Paroxysmal atrial fibrillation (H)  Pathophysiology of afib discussed in depth with patient.  Patient would also benefit from anticoagulation due to CHADS-VASC2 score of 3.  We discussed warfarin vs NOAC and patient elected to start pradaxa.  Due to elevated rates in afib and SVT, will have patient start metoprolol to control rate.  - dabigatran ANTICOAGULANT (PRADAXA) 150 MG capsule; Take 1 capsule (150 mg) by mouth twice daily. Store in original  's bottle or blister pack; use within 120 days of opening.  Dispense: 60 capsule; Refill: 5  - escitalopram (LEXAPRO) 20 MG tablet; Take 1 tablet (20 mg) by mouth daily  Dispense: 90 tablet; Refill: 1  - ASPIRIN NOT PRESCRIBED (INTENTIONAL); Please choose reason not prescribed, below  Dispense: 0 each; Refill: 0  - INR  - metoprolol (TOPROL-XL) 25 MG 24 hr tablet; Take 0.5 tablets (12.5 mg) by mouth daily  Dispense: 15 tablet; Refill: 1    2. Hyperlipidemia LDL goal <100    - simvastatin (ZOCOR) 20 MG tablet; TAKE 1 TABLET (20 MG) BY MOUTH AT BEDTIME NEED LABS  Dispense: 90 tablet; Refill: 1  - LDL cholesterol direct    3. Vitamin B12 deficiency (non anemic)    - cyanocobalamin (CVS VITAMIN  B12) 1000 MCG TABS; TAKE 1 TABLET (1,000 MCG) BY MOUTH DAILY  Dispense: 90 tablet; Refill: 1    4. Iron deficiency    - Ferrous Sulfate (CVS SLOW RELEASE IRON) 143 (45 FE) MG TBCR; Take 1 tablet by mouth 2 times daily  Dispense: 180 tablet; Refill: 1    5. Need for prophylactic vaccination and inoculation against influenza    - FLU VACCINE, 3 YRS +, IM (QUADRIVALENT W/PRESERVATIVES/MULTI-DOSE) [34746]  - Vaccine Administration, Initial [06847]    6. SVT (supraventricular tachycardia) (H)  As above.     Greater than 25 min with greater than 50 % in counseling were spent with Gladys Singh.        FUTURE APPOINTMENTS:       - Follow-up visit in 1-2 weeks.    JACKELINE Hare [facility]

## 2017-09-01 NOTE — PATIENT INSTRUCTIONS
[facility]    Understanding Atrial Fibrillation    An arrhythmia is any problem with the speed or pattern of the heartbeat. Atrial fibrillation (AFib) is a common type of arrhythmia. It causes fast, chaotic electrical signals in the atria. This leads to poor functioning of the heart. It also affects how much blood your heart can pump out to the body.  Afib may occur once in a while and go away on its own. Or it may continue for longer periods and need treatment.  AFib can lead to serious problems, such as stroke. Your healthcare provider will need to monitor and manage it.  What happens during atrial fibrillation?   The heart has an electrical system that sends signals to control the heartbeat. As signals move through the heart, they tell the heart s upper chambers (atria) and lower chambers (ventricles) when to squeeze (contract) and relax. This lets blood move through the heart and out to the body and lungs.  With AFib, the atria receive abnormal signals. This causes them to contract in a fast and irregular way, and out of sync with the ventricles. When this happens, the atria also have a harder time moving blood into the ventricles. Blood may then pool in the atria, which increases the risk for blood clots and stroke. The ventricles also may contract too quickly and irregularly. As a result, they may not pump blood to the body and lungs as well as they should. This can weaken the heart muscle over time and cause heart failure.  What causes atrial fibrillation?  AFib is more common in older adults. It has many possible causes including:    Coronary artery disease    Heart valve disease    Heart attack    Heart surgery    High blood pressure    Thyroid disease    Diabetes    Lung disease    Sleep apnea    Heavy alcohol use  In some cases of AFib, doctors do not know the cause.  What are the symptoms of atrial fibrillation?  AFib may or may not cause symptoms. If symptoms do occur, they may include:    A fast,  pounding, irregular heartbeat    Shortness of breath    Tiredness    Dizziness or fainting    Chest pain  How is atrial fibrillation treated?  Treatments for AFib can include any of the options below.    Medicines. You may be prescribed:    Heart rate medicines to help slow down the heartbeat    Heart rhythm medicines to help the heart beat more regularly    Anti-clotting medicines to help reduce the risk for blood clots and stroke    Electrical cardioversion. Your healthcare provider uses special pads or paddles to send one or more brief electrical shocks to the heart. This can help reset the heartbeat to normal.    Ablation. Long, thin tubes called catheters are threaded through a blood vessel to the heart. There, the catheters send out hot or cold energy to the areas causing the abnormal signals. This energy destroys the problem tissue or cells. This improves the chances that your heart will stay in normal rhythm without using medicines. If your heart rate and rhythm can t be controlled, you may need ablation and a pacemaker. These will help control the heart rate and regularity of the heartbeat.    Surgery. During surgery, your healthcare provider may use different methods to create scar tissue in the areas of the heart causing the abnormal signals. The scar tissue disrupts the abnormal signals and may stop AFib from occurring.  What are the complications of atrial fibrillation?  These can include:    Blood clots    Stroke    Heart failure. This problem occurs when the heart muscle weakens so much that it can no longer pump blood well.  When should I call my healthcare provider?  Call your healthcare provider right away if you have any of these:    Symptoms that don t get better with treatment, or get worse    New symptoms   Date Last Reviewed: 5/1/2016 2000-2017 [employer]. [address] [place] [address], [place] [address]. All rights reserved. This information is not intended as a substitute for  professional medical care. Always follow your healthcare professional's instructions.        Understanding Supraventricular Tachycardia  Supraventricular tachycardia (SVT) is a type of abnormal heart rhythm that results in fast heartbeats. The heart normally beats 60 to 100 beats per minute while you are at rest and awake. With SVT, the heart beats more than 100 times a minute. It may even beat over 200 times a minute. This is caused by a problem in the electrical system of the heart. It can lessen the amount of blood pumped through the heart.  How the heart beats  A heartbeat is the rhythm of the heart as it contracts to squeeze blood through the body. It s caused by electrical signals in the heart. A beat starts when a special group of cells give off an electrical signal. These cells are in the sinoatrial (SA) node. The SA node is in the upper right chamber of your heart (atrium). The signal from the SA node travels down to the 2 lower chambers of your heart (ventricles). On the way, the signal goes through the atrioventricular (AV) node. This is a special group of cells between the atria and ventricles. From there, the signal travels to your left and right ventricles. As it travels, the signal tells nearby parts of your heart to contract. This causes your heart to pump in a coordinated way.  What is SVT?  When you have SVT, the signal to start your heartbeat doesn t come from the SA node. Instead it comes from another part of the left or right atrium. Or it comes from the AV node. Some area outside the SA node begins to fire quickly, causing a rapid heartbeat of over 100 beats per minute or the electrical signals are caught up in an abnormal looping circuit. This shortens the time your ventricles have to fill. If your heartbeat is fast enough, your heart may be unable to pump enough blood forward to the rest of your body. The abnormal heartbeat may last for a few seconds to a few hours before your heart returns  to its normal rhythm. Some SVT rhythms can last for days or weeks, or even become permanent.  Types of SVT  There are several types of SVT. They include:    Atrial fibrillation. This is most common type of SVT. The upper chambers of the heart quiver very fast instead of pumping due to disorganized electrical activity in the atria.    Atrial flutter. This type of SVT is a milder form of fibrillation. The upper chambers of the heart flutter instead of pumping normally.    Atrioventricular octavio reentrant tachycardia. It occurs when you have two channels through the AV node, instead of just one. The signal goes down one channel and up the other.    Atrioventricular reciprocating tachycardia. With this condition, there is an extra connection of muscle between the atrium and the ventricle. This is known as an accessory pathway and can conduct electricity upwards and downwards. The signal goes down the AV node and back to the atrium through the accessory pathway. It then goes down the AV node again. In rare cases, this condition leads to an abnormal heart rhythm that causes sudden death. This is a congenital defect, which means you were born with it.    Atrial tachycardia. This is another common type of SVT. A small group of cells in the atria begin to fire abnormally and trigger the fast heartbeat.    Multifocal atrial tachycardia. Multiple groups of cells in your atria fire abnormally and trigger a fast heartbeat.  What causes SVT?  Some types of SVT run in families. Some people have heart problems from birth that cause SVT. High blood pressure, heart failure, mitral valve disease, sleep apnea, thyroid problems, and heart attacks can cause SVT. Smoking, excess caffeine or alcohol, and some medicines can increase your risk of having SVT.  Symptoms of SVT  When SVT happens, you may feel no symptoms. Or you may have:    Fluttering feelings in your chest (palpitations)    A tight feeling or pain in your chest    A pulsing  feeling in your neck    Dizziness    Shortness of breath    Tiredness    Fainting    Nausea  In very rare cases, SVT can cause sudden death.  Diagnosing SVT  Your primary healthcare provider may diagnose you. Or you may see a heart doctor (cardiologist). The doctor will ask about your health history. He or she will also give you a physical exam. You may also have tests. These help show what kind of SVT you have, and what may cause it. They also help check for other problems. The tests may include:    Electrocardiogram (ECG), to analyze the abnormal rhythm    Continuous heart monitors such as a holter monitor or an event recorder to watch your heart rhythm over a longer period in an attempt to catch the SVT rhythm    Blood tests, to look for various causes such as thyroid problems or electrolyte abnormalities    Chest X-ray, to check for lung problems and look at the size of your heart    Exercise stress test, to see how well your heart works under stress    Echocardiography, to check your heart structure and function    Electrophysiologic study (EPS), an invasive procedure using wires in the heart to check the heart's electrical signals and diagnose the SVT  Date Last Reviewed: 5/1/2016 2000-2017 [email or web address]. [address] [place] [address]. All rights reserved. This information is not intended as a substitute for professional medical care. Always follow your healthcare professional's instructions.        Dabigatran etexilate Oral capsule  What is this medicine?  DABIGATRAN (DA bi GAT ran) is an anticoagulant (blood thinner). It is used to lower the chance of stroke in people with a medical condition called atrial fibrillation. It is also used to treat or prevent blood clots in the lungs or in the veins.  This medicine may be used for other purposes; ask your health care provider or pharmacist if you have questions.  What should I tell my health care provider before I take this  medicine?  They need to know if you have any of these conditions:    bleeding disorders    history of stomach bleeding    mechanical heart valve    kidney disease    recent or planned spinal or epidural procedure    an allergic reaction to dabigatran, other medicines, foods, dyes, or preservatives    pregnant or trying to get pregnant    breast-feeding  How should I use this medicine?  Take this medicine by mouth with a full glass of water. Follow the directions on the prescription label. Swallow the capsules whole. Do not break, chew, or empty the contents from the capsule. You can take it with or without food. If it upsets your stomach, take it with food. Take your medicine at regular intervals. Do not take it more often than directed. Do not stop taking except on your doctor's advice. Stopping this medicine may increase your risk of a blot clot. Be sure to refill your prescription before you run out of medicine.  A special MedGuide will be given to you by the pharmacist with each prescription and refill. Be sure to read this information carefully each time.  Talk to your pediatrician regarding the use of this medicine in children. Special care may be needed.  Overdosage: If you think you have taken too much of this medicine contact a poison control center or emergency room at once.  NOTE: This medicine is only for you. Do not share this medicine with others.  What if I miss a dose?  If you miss a dose, take it as soon as you can. If your next dose is less than 6 hours away, skip the missed dose. Do not take double or extra doses.  What may interact with this medicine?  Do not take this medicine with any of the following medications:    certain medicines that treat or prevent blood clots like warfarin, enoxaparin, and dalteparin    mifepristone, RU-486  This medicine may also interact with the following:    carbamazepine    certain medicines for depression    dronedarone    ketoconazole    NSAIDs, medicines for  pain and inflammation, like ibuprofen or naproxen    quinidine    rifampin    tipranavir  This list may not describe all possible interactions. Give your health care provider a list of all the medicines, herbs, non-prescription drugs, or dietary supplements you use. Also tell them if you smoke, drink alcohol, or use illegal drugs. Some items may interact with your medicine.  What should I watch for while using this medicine?  Visit your doctor or health care professional for regular check ups.  Notify your doctor or health care professional and seek emergency treatment if you develop breathing problems; changes in vision; chest pain; severe, sudden headache; pain, swelling, warmth in the leg; trouble speaking; sudden numbness or weakness of the face, arm, or leg. These can be signs that your condition has gotten worse.  If you are going to have surgery, tell your doctor or health care professional that you are taking this medicine.  Tell your health care professional that you use this medicine before you have a spinal or epidural procedure. Sometimes people who take this medicine have bleeding problems around the spine when they have a spinal or epidural procedure. This bleeding is very rare. If you have a spinal or epidural procedure while on this medicine, call your health care professional immediately if you have back pain, numbness or tingling (especially in your legs and feet), muscle weakness, paralysis, or loss of bladder or bowel control.  Avoid sports and activities that might cause injury while you are using this medicine. Severe falls or injuries can cause unseen bleeding. Be careful when using sharp tools or knives. Consider using an electric razor. Take special care brushing or flossing your teeth. Report any injuries, bruising, or red spots on the skin to your doctor or health care professional.  What side effects may I notice from receiving this medicine?  Side effects that you should report to your  doctor or health care professional as soon as possible:    allergic reactions like skin rash, itching or hives, swelling of the face, lips, or tongue    feeling faint or lightheaded, falls    signs and symptoms of bleeding such as bloody or black, tarry stools; red or dark-brown urine; spitting up blood or brown material that looks like coffee grounds; red spots on the skin; unusual bruising or bleeding from the eye, gums, or nose  Side effects that usually do not require medical attention (report these to your doctor or health care professional if they continue or are bothersome):    stomach pain    upset stomach  This list may not describe all possible side effects. Call your doctor for medical advice about side effects. You may report side effects to FDA at 2-536-FDA-1377.  Where should I keep my medicine?  Keep out of the reach of children.  Store at room temperature between 15 and 30 degrees C (59 and 86 degrees F). Keep capsules in the original container. Do not store or place capsules in any other container, such as pill boxes or pill organizers. After opening the bottle, use within 4 months. Throw away any unused medicine after 4 months.  NOTE: This sheet is a summary. It may not cover all possible information. If you have questions about this medicine, talk to your doctor, pharmacist, or health care provider.  NOTE:This sheet is a summary. It may not cover all possible information. If you have questions about this medicine, talk to your doctor, pharmacist, or health care provider. Copyright  2016 [employer]        Patient Education    Metoprolol Succinate Oral tablet, extended-release    Metoprolol Tartrate Oral tablet    Metoprolol Tartrate Solution for injection  Metoprolol Succinate Oral tablet, extended-release  What is this medicine?  METOPROLOL (me TOE proe lole) is a beta-blocker. Beta-blockers reduce the workload on the heart and help it to beat more regularly. This medicine is used to treat high  blood pressure and to prevent chest pain. It is also used to after a heart attack and to prevent an additional heart attack from occurring.  This medicine may be used for other purposes; ask your health care provider or pharmacist if you have questions.  What should I tell my health care provider before I take this medicine?  They need to know if you have any of these conditions:    diabetes    heart or vessel disease like slow heart rate, worsening heart failure, heart block, sick sinus syndrome or Raynaud's disease    kidney disease    liver disease    lung or breathing disease, like asthma or emphysema    pheochromocytoma    thyroid disease    an unusual or allergic reaction to metoprolol, other beta-blockers, medicines, foods, dyes, or preservatives    pregnant or trying to get pregnant    breast-feeding  How should I use this medicine?  Take this medicine by mouth with a glass of water. Follow the directions on the prescription label. Do not crush or chew. Take this medicine with or immediately after meals. Take your doses at regular intervals. Do not take more medicine than directed. Do not stop taking this medicine suddenly. This could lead to serious heart-related effects.  Talk to your pediatrician regarding the use of this medicine in children. While this drug may be prescribed for children as young as 6 years for selected conditions, precautions do apply.  Overdosage: If you think you have taken too much of this medicine contact a poison control center or emergency room at once.  NOTE: This medicine is only for you. Do not share this medicine with others.  What if I miss a dose?  If you miss a dose, take it as soon as you can. If it is almost time for your next dose, take only that dose. Do not take double or extra doses.  What may interact with this medicine?  This medicine may interact with the following medications:    certain medicines for blood pressure, heart disease, irregular heart beat    certain  medicines for depression, like monoamine oxidase (MAO) inhibitors, fluoxetine, or paroxetine    clonidine    dobutamine    epinephrine    isoproterenol    reserpine  This list may not describe all possible interactions. Give your health care provider a list of all the medicines, herbs, non-prescription drugs, or dietary supplements you use. Also tell them if you smoke, drink alcohol, or use illegal drugs. Some items may interact with your medicine.  What should I watch for while using this medicine?  Visit your doctor or health care professional for regular check ups. Contact your doctor right away if your symptoms worsen. Check your blood pressure and pulse rate regularly. Ask your health care professional what your blood pressure and pulse rate should be, and when you should contact them.  You may get drowsy or dizzy. Do not drive, use machinery, or do anything that needs mental alertness until you know how this medicine affects you. Do not sit or stand up quickly, especially if you are an older patient. This reduces the risk of dizzy or fainting spells. Contact your doctor if these symptoms continue. Alcohol may interfere with the effect of this medicine. Avoid alcoholic drinks.  What side effects may I notice from receiving this medicine?  Side effects that you should report to your doctor or health care professional as soon as possible:    allergic reactions like skin rash, itching or hives    cold or numb hands or feet    depression    difficulty breathing    faint    fever with sore throat    irregular heartbeat, chest pain    rapid weight gain    swollen legs or ankles  Side effects that usually do not require medical attention (report to your doctor or health care professional if they continue or are bothersome):    anxiety or nervousness    change in sex drive or performance    dry skin    headache    nightmares or trouble sleeping    short term memory loss    stomach upset or diarrhea    unusually  tired  This list may not describe all possible side effects. Call your doctor for medical advice about side effects. You may report side effects to FDA at 0-247-IWE-4661.  Where should I keep my medicine?  Keep out of the reach of children.  Store at room temperature between 15 and 30 degrees C (59 and 86 degrees F). Throw away any unused medicine after the expiration date.  NOTE:This sheet is a summary. It may not cover all possible information. If you have questions about this medicine, talk to your doctor, pharmacist, or health care provider. Copyright  2016 [employer]      [facility]    If you have any questions regarding to your visit please contact your care team:     Team Pink:   Clinic Hours Telephone Number   Internal Medicine:  Dr. Octavia Monroe, NP       7am-7pm  Monday - Thursday   7am-5pm  Fridays  [phone]  (Appointment scheduling available 24/7)    Questions about your visit?  Team Line  [phone]   Urgent Care - [place] [facility] and [place] - 11am-9pm Monday-Friday Saturday-Sunday- 9am-5pm   [place] - 5pm-9pm Monday-Friday Saturday-Sunday- 9am-5pm  [phone] - [place]   [phone] - [place]       What options do I have for visits at the clinic other than the traditional office visit?  To expand how we care for you, many of our providers are utilizing electronic visits (e-visits) and telephone visits, when medically appropriate, for interactions with their patients rather than a visit in the clinic.   We also offer nurse visits for many medical concerns. Just like any other service, we will bill your insurance company for this type of visit based on time spent on the phone with your provider. Not all insurance companies cover these visits. Please check with your medical insurance if this type of visit is covered. You will be responsible for any charges that are not paid by your insurance.      E-visits via [employer]:   generally incur a $35.00 fee.  Telephone visits:  Time spent on the phone: *charged based on time that is spent on the phone in increments of 10 minutes. Estimated cost:   5-10 mins $30.00   11-20 mins. $59.00   21-30 mins. $85.00   Use [employer] (secure email communication and access to your chart) to send your primary care provider a message or make an appointment. Ask someone on your Team how to sign up for [employer].    For a Price Quote for your services, please call our Consumer Price Line at [phone].    As always, Thank you for trusting us with your health care needs!    Discharged by Caitlyn CHISHOLM CMA ([facility])

## 2017-09-01 NOTE — MR AVS SNAPSHOT
After Visit Summary   9/1/2017    Gladys Singh    MRN: [id number]           Patient Information     Date Of Birth          1960        Visit Information        Provider Department      9/1/2017 2:20 PM Vania Monroe APRN [facility]        Today's Diagnoses     Paroxysmal atrial fibrillation (H)    -  1    Hyperlipidemia LDL goal <100        Vitamin B12 deficiency (non anemic)        Iron deficiency        Need for prophylactic vaccination and inoculation against influenza        SVT (supraventricular tachycardia) (H)          Care Instructions        STOP ASPIRIN    Understanding Atrial Fibrillation    An arrhythmia is any problem with the speed or pattern of the heartbeat. Atrial fibrillation (AFib) is a common type of arrhythmia. It causes fast, chaotic electrical signals in the atria. This leads to poor functioning of the heart. It also affects how much blood your heart can pump out to the body.  Afib may occur once in a while and go away on its own. Or it may continue for longer periods and need treatment.  AFib can lead to serious problems, such as stroke. Your healthcare provider will need to monitor and manage it.  What happens during atrial fibrillation?   The heart has an electrical system that sends signals to control the heartbeat. As signals move through the heart, they tell the heart s upper chambers (atria) and lower chambers (ventricles) when to squeeze (contract) and relax. This lets blood move through the heart and out to the body and lungs.  With AFib, the atria receive abnormal signals. This causes them to contract in a fast and irregular way, and out of sync with the ventricles. When this happens, the atria also have a harder time moving blood into the ventricles. Blood may then pool in the atria, which increases the risk for blood clots and stroke. The ventricles also may contract too quickly and irregularly. As a result, they may not  pump blood to the body and lungs as well as they should. This can weaken the heart muscle over time and cause heart failure.  What causes atrial fibrillation?  AFib is more common in older adults. It has many possible causes including:    Coronary artery disease    Heart valve disease    Heart attack    Heart surgery    High blood pressure    Thyroid disease    Diabetes    Lung disease    Sleep apnea    Heavy alcohol use  In some cases of AFib, doctors do not know the cause.  What are the symptoms of atrial fibrillation?  AFib may or may not cause symptoms. If symptoms do occur, they may include:    A fast, pounding, irregular heartbeat    Shortness of breath    Tiredness    Dizziness or fainting    Chest pain  How is atrial fibrillation treated?  Treatments for AFib can include any of the options below.    Medicines. You may be prescribed:    Heart rate medicines to help slow down the heartbeat    Heart rhythm medicines to help the heart beat more regularly    Anti-clotting medicines to help reduce the risk for blood clots and stroke    Electrical cardioversion. Your healthcare provider uses special pads or paddles to send one or more brief electrical shocks to the heart. This can help reset the heartbeat to normal.    Ablation. Long, thin tubes called catheters are threaded through a blood vessel to the heart. There, the catheters send out hot or cold energy to the areas causing the abnormal signals. This energy destroys the problem tissue or cells. This improves the chances that your heart will stay in normal rhythm without using medicines. If your heart rate and rhythm can t be controlled, you may need ablation and a pacemaker. These will help control the heart rate and regularity of the heartbeat.    Surgery. During surgery, your healthcare provider may use different methods to create scar tissue in the areas of the heart causing the abnormal signals. The scar tissue disrupts the abnormal signals and may stop  AFib from occurring.  What are the complications of atrial fibrillation?  These can include:    Blood clots    Stroke    Heart failure. This problem occurs when the heart muscle weakens so much that it can no longer pump blood well.  When should I call my healthcare provider?  Call your healthcare provider right away if you have any of these:    Symptoms that don t get better with treatment, or get worse    New symptoms   Date Last Reviewed: 5/1/2016 2000-2017 [employer]. [address] [place] [address], [place] [address]. All rights reserved. This information is not intended as a substitute for professional medical care. Always follow your healthcare professional's instructions.        Understanding Supraventricular Tachycardia  Supraventricular tachycardia (SVT) is a type of abnormal heart rhythm that results in fast heartbeats. The heart normally beats 60 to 100 beats per minute while you are at rest and awake. With SVT, the heart beats more than 100 times a minute. It may even beat over 200 times a minute. This is caused by a problem in the electrical system of the heart. It can lessen the amount of blood pumped through the heart.  How the heart beats  A heartbeat is the rhythm of the heart as it contracts to squeeze blood through the body. It s caused by electrical signals in the heart. A beat starts when a special group of cells give off an electrical signal. These cells are in the sinoatrial (SA) node. The SA node is in the upper right chamber of your heart (atrium). The signal from the SA node travels down to the 2 lower chambers of your heart (ventricles). On the way, the signal goes through the atrioventricular (AV) node. This is a special group of cells between the atria and ventricles. From there, the signal travels to your left and right ventricles. As it travels, the signal tells nearby parts of your heart to contract. This causes your heart to pump in a coordinated way.  What is SVT?  When  you have SVT, the signal to start your heartbeat doesn t come from the SA node. Instead it comes from another part of the left or right atrium. Or it comes from the AV node. Some area outside the SA node begins to fire quickly, causing a rapid heartbeat of over 100 beats per minute or the electrical signals are caught up in an abnormal looping circuit. This shortens the time your ventricles have to fill. If your heartbeat is fast enough, your heart may be unable to pump enough blood forward to the rest of your body. The abnormal heartbeat may last for a few seconds to a few hours before your heart returns to its normal rhythm. Some SVT rhythms can last for days or weeks, or even become permanent.  Types of SVT  There are several types of SVT. They include:    Atrial fibrillation. This is most common type of SVT. The upper chambers of the heart quiver very fast instead of pumping due to disorganized electrical activity in the atria.    Atrial flutter. This type of SVT is a milder form of fibrillation. The upper chambers of the heart flutter instead of pumping normally.    Atrioventricular octavio reentrant tachycardia. It occurs when you have two channels through the AV node, instead of just one. The signal goes down one channel and up the other.    Atrioventricular reciprocating tachycardia. With this condition, there is an extra connection of muscle between the atrium and the ventricle. This is known as an accessory pathway and can conduct electricity upwards and downwards. The signal goes down the AV node and back to the atrium through the accessory pathway. It then goes down the AV node again. In rare cases, this condition leads to an abnormal heart rhythm that causes sudden death. This is a congenital defect, which means you were born with it.    Atrial tachycardia. This is another common type of SVT. A small group of cells in the atria begin to fire abnormally and trigger the fast heartbeat.    Multifocal atrial  tachycardia. Multiple groups of cells in your atria fire abnormally and trigger a fast heartbeat.  What causes SVT?  Some types of SVT run in families. Some people have heart problems from birth that cause SVT. High blood pressure, heart failure, mitral valve disease, sleep apnea, thyroid problems, and heart attacks can cause SVT. Smoking, excess caffeine or alcohol, and some medicines can increase your risk of having SVT.  Symptoms of SVT  When SVT happens, you may feel no symptoms. Or you may have:    Fluttering feelings in your chest (palpitations)    A tight feeling or pain in your chest    A pulsing feeling in your neck    Dizziness    Shortness of breath    Tiredness    Fainting    Nausea  In very rare cases, SVT can cause sudden death.  Diagnosing SVT  Your primary healthcare provider may diagnose you. Or you may see a heart doctor (cardiologist). The doctor will ask about your health history. He or she will also give you a physical exam. You may also have tests. These help show what kind of SVT you have, and what may cause it. They also help check for other problems. The tests may include:    Electrocardiogram (ECG), to analyze the abnormal rhythm    Continuous heart monitors such as a holter monitor or an event recorder to watch your heart rhythm over a longer period in an attempt to catch the SVT rhythm    Blood tests, to look for various causes such as thyroid problems or electrolyte abnormalities    Chest X-ray, to check for lung problems and look at the size of your heart    Exercise stress test, to see how well your heart works under stress    Echocardiography, to check your heart structure and function    Electrophysiologic study (EPS), an invasive procedure using wires in the heart to check the heart's electrical signals and diagnose the SVT  Date Last Reviewed: 5/1/2016 2000-2017 [employer]. [address] [place] [address], [place] [address]. All rights reserved. This information is not  intended as a substitute for professional medical care. Always follow your healthcare professional's instructions.        Dabigatran etexilate Oral capsule  What is this medicine?  DABIGATRAN (DA bi GAT ran) is an anticoagulant (blood thinner). It is used to lower the chance of stroke in people with a medical condition called atrial fibrillation. It is also used to treat or prevent blood clots in the lungs or in the veins.  This medicine may be used for other purposes; ask your health care provider or pharmacist if you have questions.  What should I tell my health care provider before I take this medicine?  They need to know if you have any of these conditions:    bleeding disorders    history of stomach bleeding    mechanical heart valve    kidney disease    recent or planned spinal or epidural procedure    an allergic reaction to dabigatran, other medicines, foods, dyes, or preservatives    pregnant or trying to get pregnant    breast-feeding  How should I use this medicine?  Take this medicine by mouth with a full glass of water. Follow the directions on the prescription label. Swallow the capsules whole. Do not break, chew, or empty the contents from the capsule. You can take it with or without food. If it upsets your stomach, take it with food. Take your medicine at regular intervals. Do not take it more often than directed. Do not stop taking except on your doctor's advice. Stopping this medicine may increase your risk of a blot clot. Be sure to refill your prescription before you run out of medicine.  A special MedGuide will be given to you by the pharmacist with each prescription and refill. Be sure to read this information carefully each time.  Talk to your pediatrician regarding the use of this medicine in children. Special care may be needed.  Overdosage: If you think you have taken too much of this medicine contact a poison control center or emergency room at once.  NOTE: This medicine is only for you.  Do not share this medicine with others.  What if I miss a dose?  If you miss a dose, take it as soon as you can. If your next dose is less than 6 hours away, skip the missed dose. Do not take double or extra doses.  What may interact with this medicine?  Do not take this medicine with any of the following medications:    certain medicines that treat or prevent blood clots like warfarin, enoxaparin, and dalteparin    mifepristone, RU-486  This medicine may also interact with the following:    carbamazepine    certain medicines for depression    dronedarone    ketoconazole    NSAIDs, medicines for pain and inflammation, like ibuprofen or naproxen    quinidine    rifampin    tipranavir  This list may not describe all possible interactions. Give your health care provider a list of all the medicines, herbs, non-prescription drugs, or dietary supplements you use. Also tell them if you smoke, drink alcohol, or use illegal drugs. Some items may interact with your medicine.  What should I watch for while using this medicine?  Visit your doctor or health care professional for regular check ups.  Notify your doctor or health care professional and seek emergency treatment if you develop breathing problems; changes in vision; chest pain; severe, sudden headache; pain, swelling, warmth in the leg; trouble speaking; sudden numbness or weakness of the face, arm, or leg. These can be signs that your condition has gotten worse.  If you are going to have surgery, tell your doctor or health care professional that you are taking this medicine.  Tell your health care professional that you use this medicine before you have a spinal or epidural procedure. Sometimes people who take this medicine have bleeding problems around the spine when they have a spinal or epidural procedure. This bleeding is very rare. If you have a spinal or epidural procedure while on this medicine, call your health care professional immediately if you have back  pain, numbness or tingling (especially in your legs and feet), muscle weakness, paralysis, or loss of bladder or bowel control.  Avoid sports and activities that might cause injury while you are using this medicine. Severe falls or injuries can cause unseen bleeding. Be careful when using sharp tools or knives. Consider using an electric razor. Take special care brushing or flossing your teeth. Report any injuries, bruising, or red spots on the skin to your doctor or health care professional.  What side effects may I notice from receiving this medicine?  Side effects that you should report to your doctor or health care professional as soon as possible:    allergic reactions like skin rash, itching or hives, swelling of the face, lips, or tongue    feeling faint or lightheaded, falls    signs and symptoms of bleeding such as bloody or black, tarry stools; red or dark-brown urine; spitting up blood or brown material that looks like coffee grounds; red spots on the skin; unusual bruising or bleeding from the eye, gums, or nose  Side effects that usually do not require medical attention (report these to your doctor or health care professional if they continue or are bothersome):    stomach pain    upset stomach  This list may not describe all possible side effects. Call your doctor for medical advice about side effects. You may report side effects to FDA at 0-280-FDA-8866.  Where should I keep my medicine?  Keep out of the reach of children.  Store at room temperature between 15 and 30 degrees C (59 and 86 degrees F). Keep capsules in the original container. Do not store or place capsules in any other container, such as pill boxes or pill organizers. After opening the bottle, use within 4 months. Throw away any unused medicine after 4 months.  NOTE: This sheet is a summary. It may not cover all possible information. If you have questions about this medicine, talk to your doctor, pharmacist, or health care  provider.  NOTE:This sheet is a summary. It may not cover all possible information. If you have questions about this medicine, talk to your doctor, pharmacist, or health care provider. Copyright  2016 [employer]        Patient Education    Metoprolol Succinate Oral tablet, extended-release    Metoprolol Tartrate Oral tablet    Metoprolol Tartrate Solution for injection  Metoprolol Succinate Oral tablet, extended-release  What is this medicine?  METOPROLOL (me TOE proe lole) is a beta-blocker. Beta-blockers reduce the workload on the heart and help it to beat more regularly. This medicine is used to treat high blood pressure and to prevent chest pain. It is also used to after a heart attack and to prevent an additional heart attack from occurring.  This medicine may be used for other purposes; ask your health care provider or pharmacist if you have questions.  What should I tell my health care provider before I take this medicine?  They need to know if you have any of these conditions:    diabetes    heart or vessel disease like slow heart rate, worsening heart failure, heart block, sick sinus syndrome or Raynaud's disease    kidney disease    liver disease    lung or breathing disease, like asthma or emphysema    pheochromocytoma    thyroid disease    an unusual or allergic reaction to metoprolol, other beta-blockers, medicines, foods, dyes, or preservatives    pregnant or trying to get pregnant    breast-feeding  How should I use this medicine?  Take this medicine by mouth with a glass of water. Follow the directions on the prescription label. Do not crush or chew. Take this medicine with or immediately after meals. Take your doses at regular intervals. Do not take more medicine than directed. Do not stop taking this medicine suddenly. This could lead to serious heart-related effects.  Talk to your pediatrician regarding the use of this medicine in children. While this drug may be prescribed for children as  young as 6 years for selected conditions, precautions do apply.  Overdosage: If you think you have taken too much of this medicine contact a poison control center or emergency room at once.  NOTE: This medicine is only for you. Do not share this medicine with others.  What if I miss a dose?  If you miss a dose, take it as soon as you can. If it is almost time for your next dose, take only that dose. Do not take double or extra doses.  What may interact with this medicine?  This medicine may interact with the following medications:    certain medicines for blood pressure, heart disease, irregular heart beat    certain medicines for depression, like monoamine oxidase (MAO) inhibitors, fluoxetine, or paroxetine    clonidine    dobutamine    epinephrine    isoproterenol    reserpine  This list may not describe all possible interactions. Give your health care provider a list of all the medicines, herbs, non-prescription drugs, or dietary supplements you use. Also tell them if you smoke, drink alcohol, or use illegal drugs. Some items may interact with your medicine.  What should I watch for while using this medicine?  Visit your doctor or health care professional for regular check ups. Contact your doctor right away if your symptoms worsen. Check your blood pressure and pulse rate regularly. Ask your health care professional what your blood pressure and pulse rate should be, and when you should contact them.  You may get drowsy or dizzy. Do not drive, use machinery, or do anything that needs mental alertness until you know how this medicine affects you. Do not sit or stand up quickly, especially if you are an older patient. This reduces the risk of dizzy or fainting spells. Contact your doctor if these symptoms continue. Alcohol may interfere with the effect of this medicine. Avoid alcoholic drinks.  What side effects may I notice from receiving this medicine?  Side effects that you should report to your doctor or health  care professional as soon as possible:    allergic reactions like skin rash, itching or hives    cold or numb hands or feet    depression    difficulty breathing    faint    fever with sore throat    irregular heartbeat, chest pain    rapid weight gain    swollen legs or ankles  Side effects that usually do not require medical attention (report to your doctor or health care professional if they continue or are bothersome):    anxiety or nervousness    change in sex drive or performance    dry skin    headache    nightmares or trouble sleeping    short term memory loss    stomach upset or diarrhea    unusually tired  This list may not describe all possible side effects. Call your doctor for medical advice about side effects. You may report side effects to FDA at 7-754-UJY-6535.  Where should I keep my medicine?  Keep out of the reach of children.  Store at room temperature between 15 and 30 degrees C (59 and 86 degrees F). Throw away any unused medicine after the expiration date.  NOTE:This sheet is a summary. It may not cover all possible information. If you have questions about this medicine, talk to your doctor, pharmacist, or health care provider. Copyright  2016 [employer]      [facility]    If you have any questions regarding to your visit please contact your care team:     Team Pink:   Clinic Hours Telephone Number   Internal Medicine:  Dr. Octavia Monroe, NP       7am-7pm  Monday - Thursday   7am-5pm  Fridays  [phone]  (Appointment scheduling available 24/7)    Questions about your visit?  Team Line  [phone]   Urgent Care - [place] - 11am-9pm Monday-Friday Saturday-Sunday- 9am-5pm   [place] - 5pm-9pm Monday-Friday Saturday-Sunday- 9am-5pm  [phone] - Michelle   [phone] - [place]       What options do I have for visits at the clinic other than the traditional office visit?  To expand how we care for  you, many of our providers are utilizing electronic visits (e-visits) and telephone visits, when medically appropriate, for interactions with their patients rather than a visit in the clinic.   We also offer nurse visits for many medical concerns. Just like any other service, we will bill your insurance company for this type of visit based on time spent on the phone with your provider. Not all insurance companies cover these visits. Please check with your medical insurance if this type of visit is covered. You will be responsible for any charges that are not paid by your insurance.      E-visits via [employer]:  generally incur a $35.00 fee.  Telephone visits:  Time spent on the phone: *charged based on time that is spent on the phone in increments of 10 minutes. Estimated cost:   5-10 mins $30.00   11-20 mins. $59.00   21-30 mins. $85.00   Use [employer] (secure email communication and access to your chart) to send your primary care provider a message or make an appointment. Ask someone on your Team how to sign up for [employer].    For a Price Quote for your services, please call our [employer] Line at [phone].    As always, Thank you for trusting us with your health care needs!    Discharged by Caitlyn CHISHOLM CMA ([facility])            Follow-ups after your visit        Who to contact     If you have questions or need follow up information about today's clinic visit or your schedule please contact [facility] directly at [phone].  Normal or non-critical lab and imaging results will be communicated to you by [employer], letter or phone within 4 business days after the clinic has received the results. If you do not hear from us within 7 days, please contact the clinic through [employer] or phone. If you have a critical or abnormal lab result, we will notify you by phone as soon as possible.  Submit refill requests through [employer] or call your pharmacy and they will forward the refill request to us. Please allow 3  business days for your refill to be completed.          Additional Information About Your Visit        [employer] Information     "[employer] gives you secure access to your electronic health record. If you see a primary care provider, you can also send messages to your care team and make appointments. If you have questions, please call your primary care clinic.  If you do not have a primary care provider, please call [phone] and they will assist you.        Care EveryWhere ID     This is your Care EveryWhere ID. This could be used by other organizations to access your [place] medical records  [id number]        Your Vitals Were     Pulse Temperature Last Period Pulse Oximetry Breastfeeding? BMI (Body Mass Index)    87 96.8  F (36  C) (Oral) 02/01/2010 96% No 46.81 kg/m2       Blood Pressure from Last 3 Encounters:   09/01/17 100/70   08/30/17 117/82   08/18/17 130/78    Weight from Last 3 Encounters:   09/01/17 283 lb 6.4 oz (128.5 kg)   08/18/17 286 lb (129.7 kg)   03/22/17 281 lb 12.8 oz (127.8 kg)              We Performed the Following     FLU VACCINE, 3 YRS +, IM (QUADRIVALENT W/PRESERVATIVES/MULTI-DOSE) [22257]     INR     LDL cholesterol direct     Vaccine Administration, Initial [82546]          Today's Medication Changes          These changes are accurate as of: 9/1/17  3:10 PM.  If you have any questions, ask your nurse or doctor.               Start taking these medicines.        Dose/Directions    ASPIRIN NOT PRESCRIBED   Commonly known as:  INTENTIONAL   Used for:  Paroxysmal atrial fibrillation (H)   Started by:  Vania Monroe APRN CNP        Please choose reason not prescribed, below   Quantity:  0 each   Refills:  0       dabigatran ANTICOAGULANT 150 MG capsule   Commonly known as:  PRADAXA   Used for:  Paroxysmal atrial fibrillation (H)   Started by:  Vania Monroe APRN CNP        Take 1 capsule (150 mg) by mouth twice daily. Store in original 's bottle or  blister pack; use within 120 days of opening.   Quantity:  60 capsule   Refills:  5       metoprolol 25 MG 24 hr tablet   Commonly known as:  TOPROL-XL   Used for:  Paroxysmal atrial fibrillation (H)   Started by:  Vania Monroe APRN CNP        Dose:  12.5 mg   Take 0.5 tablets (12.5 mg) by mouth daily   Quantity:  15 tablet   Refills:  1         These medicines have changed or have updated prescriptions.        Dose/Directions    cyanocobalamin 1000 MCG Tabs   Commonly known as:  [facility] vitamin  B12   This may have changed:  See the new instructions.   Used for:  Vitamin B12 deficiency (non anemic)   Changed by:  Vania Monroe APRN CNP        TAKE 1 TABLET (1,000 MCG) BY MOUTH DAILY   Quantity:  90 tablet   Refills:  1       simvastatin 20 MG tablet   Commonly known as:  ZOCOR   This may have changed:  See the new instructions.   Used for:  Hyperlipidemia LDL goal <100   Changed by:  Vania Monroe APRN CNP        TAKE 1 TABLET (20 MG) BY MOUTH AT BEDTIME NEED LABS   Quantity:  90 tablet   Refills:  1            Where to get your medicines      These medications were sent to [facility]/pharmacy [id number] - [place], [place] - [address] [address]     Phone:  [phone]     cyanocobalamin 1000 MCG Tabs    dabigatran ANTICOAGULANT 150 MG capsule    Ferrous Sulfate 143 (45 FE) MG Tbcr    metoprolol 25 MG 24 hr tablet    simvastatin 20 MG tablet         Some of these will need a paper prescription and others can be bought over the counter.  Ask your nurse if you have questions.     You don't need a prescription for these medications     ASPIRIN NOT PRESCRIBED                Primary Care Provider Office Phone # Fax #    JACKELINE Arneas [phone] [address] [facility] [address]        Equal Access to Services     OSCAR URBANO AH: Luis Alfredo Land, jacoby cuadra, lance baxter, chelsie guadarrama  ralph isadoraseamus lindsayrajani laerica ah. So [facility] [phone].    ATENCIÓN: Si shawneela srinivas, tiene a eason disposición servicios gratuitos de asistencia lingüística. Cathryn al [phone].    We comply with applicable federal civil rights laws and [place] laws. We do not discriminate on the basis of race, color, national origin, age, disability sex, sexual orientation or gender identity.            Thank you!     Thank you for choosing [facility] [place] [facility]\""  for your care. Our goal is always to provide you with excellent care. Hearing back from our patients is one way we can continue to improve our services. Please take a few minutes to complete the written survey that you may receive in the mail after your visit with us. Thank you!             Your Updated Medication List - Protect others around you: Learn how to safely use, store and throw away your medicines at www.disposemymeds.org.          This list is accurate as of: 9/1/17  3:10 PM.  Always use your most recent med list.                   Brand Name Dispense Instructions for use Diagnosis    AMBIEN 10 MG tablet   Generic drug:  zolpidem      1 TABLET DAILY AT BEDTIME        ASPIRIN NOT PRESCRIBED    INTENTIONAL    0 each    Please choose reason not prescribed, below    Paroxysmal atrial fibrillation (H)       * blood glucose lancets standard    no brand specified    1 Box    Use to test blood sugar one times daily or as directed.    Type 2 diabetes mellitus without complication, without long-term current use of insulin (H)       * blood glucose lancets standard    no brand specified    1 Box    Use to test blood sugar one times daily or as directed.    Type 2 diabetes mellitus without complication, without long-term current use of insulin (H)       blood glucose monitoring lancets     1 Box    TEST ONCE DAILY OR AS NEEDED *DISPENSE WHATEVER IS COVERED BY INS*    Type 2 diabetes mellitus without complication, without long-term current use of insulin (H)       *  blood glucose monitoring meter device kit    no brand specified    1 kit    Use to test blood sugar one times daily or as directed.    Type 2 diabetes mellitus without complication, without long-term current use of insulin (H)       * blood glucose monitoring meter device kit    no brand specified    1 kit    Use to test blood sugar one times daily or as directed.    Type 2 diabetes mellitus without complication, without long-term current use of insulin (H)       * blood glucose monitoring test strip    no brand specified    100 strip    Use to test blood sugars one times daily or as directed    Type 2 diabetes mellitus without complication, without long-term current use of insulin (H)       * blood glucose monitoring test strip    no brand specified    100 strip    Use to test blood sugars one times daily or as directed    Type 2 diabetes mellitus without complication, without long-term current use of insulin (H)       BUSPAR PO      Take 30 mg by mouth 2 times daily        BYDUREON 2 MG pen   Generic drug:  exenatide ER      Inject 2 mg Subcutaneous every 7 days        calcium carbonate 1250 (500 CA) MG Tabs tablet    OSCAL 500    270 tablet    TAKE 1 TABLET BY MOUTH. THREE TIMES DAILY.    Elevated PTHrP level (H)       CVS SPECTRAVITE ULTRA WOMEN Tabs     90 tablet    TAKE 1 TABLET BY MOUTH DAILY    Constipation, chronic       cyanocobalamin 1000 MCG Tabs    [facility]S vitamin  B12    90 tablet    TAKE 1 TABLET (1,000 MCG) BY MOUTH DAILY    Vitamin B12 deficiency (non anemic)       dabigatran ANTICOAGULANT 150 MG capsule    PRADAXA    60 capsule    Take 1 capsule (150 mg) by mouth twice daily. Store in original 's bottle or blister pack; use within 120 days of opening.    Paroxysmal atrial fibrillation (H)       docusate sodium 100 MG capsule    COLACE    180 capsule    TAKE 1 CAPSULE (100 MG) BY MOUTH 2 TIMES DAILY    Constipation, unspecified constipation type       escitalopram 20 MG tablet    LEXAPRO     90 tablet    Take 1 tablet (20 mg) by mouth daily    Paroxysmal atrial fibrillation (H)       Ferrous Sulfate 143 (45 FE) MG Tbcr    CVS SLOW RELEASE IRON    180 tablet    Take 1 tablet by mouth 2 times daily    Iron deficiency       furosemide 40 MG tablet    LASIX    90 tablet    TAKE 1 TABLET (40 MG) BY MOUTH DAILY    Pedal edema       insulin pen needle 31G X 5 MM    B-D U/F    100 each    Use once daily or as directed.    Type 2 diabetes mellitus without complication (H)       levothyroxine 75 MCG tablet    SYNTHROID/LEVOTHROID    90 tablet    Take 1 tablet (75 mcg) by mouth daily    Acquired hypothyroidism, Vitamin D deficiency, Hyperparathyroidism (H), Abdominal pain, epigastric       lisinopril 10 MG tablet    PRINIVIL/ZESTRIL    90 tablet    TAKE 1 TABLET (10 MG) BY MOUTH DAILY    Type 2 diabetes mellitus without complication, without long-term current use of insulin (H), Essential hypertension with goal blood pressure less than 140/90       LORazepam 1 MG tablet    ATIVAN     Take 1 mg by mouth At Bedtime        metoprolol 25 MG 24 hr tablet    TOPROL-XL    15 tablet    Take 0.5 tablets (12.5 mg) by mouth daily    Paroxysmal atrial fibrillation (H)       omeprazole 20 MG CR capsule    priLOSEC    180 capsule    Take 1 capsule (20 mg) by mouth 2 times daily    Hiatal hernia       order for DME     1 Box    Test strips for pt's glucometer, brand as covered by insurance. Test twice daily or PRN.    Type 2 diabetes mellitus without complication (H)       potassium chloride 20 MEQ CR tablet   Generic drug:  potassium chloride SA     180 tablet    TAKE 1 TABLET (20 MEQ) BY MOUTH 2 TIMES DAILY    Diuresis       PROVIGIL 200 MG tablet   Generic drug:  modafinil      Take 0.5 tablets by mouth daily.        ranitidine 150 MG tablet    ZANTAC    60 tablet    TAKE 1 TABLET (150 MG) BY MOUTH 2 TIMES DAILY    Gastroesophageal reflux disease, esophagitis presence not specified       simvastatin 20 MG tablet    ZOCOR     90 tablet    TAKE 1 TABLET (20 MG) BY MOUTH AT BEDTIME NEED LABS    Hyperlipidemia LDL goal <100       vitamin D 39351 UNIT capsule    ERGOCALCIFEROL    8 capsule    TAKE 1 CAPSULE BY MOUTH ONCE WEEKLY.    Vitamin D deficiency, Hyperparathyroidism (H), Acquired hypothyroidism, Abdominal pain, epigastric       Vitamin D3 3000 UNITS Tabs           * Notice:  This list has 6 medication(s) that are the same as other medications prescribed for you. Read the directions carefully, and ask your doctor or other care provider to review them with you.

## 2017-09-01 NOTE — PROGRESS NOTES
Injectable Influenza Immunization Documentation    1.  Is the person to be vaccinated sick today?  No    2. Does the person to be vaccinated have an allergy to eggs or to a component of the vaccine?  No    3. Has the person to be vaccinated today ever had a serious reaction to influenza vaccine in the past?  No    4. Has the person to be vaccinated ever had Guillain-[place] syndrome?  No     Form completed by Sofía Carter CMA

## 2017-09-01 NOTE — NURSING NOTE
"Chief Complaint   Patient presents with     Results     Discuss holter monitor results     Flu Shot       Initial /70 (BP Location: Left arm, Cuff Size: Adult Large)  Pulse 87  Temp 96.8  F (36  C) (Oral)  Wt 283 lb 6.4 oz (128.5 kg)  LMP 02/01/2010  SpO2 96%  Breastfeeding? No  BMI 46.81 kg/m2 Estimated body mass index is 46.81 kg/(m^2) as calculated from the following:    Height as of 3/22/17: 5' 5.24\" (1.657 m).    Weight as of this encounter: 283 lb 6.4 oz (128.5 kg).  Medication Reconciliation: complete       Sofía Carter, JAQUELIN      "

## 2017-09-05 NOTE — PROGRESS NOTES
Dear Gladys,    Your recent test results are attached.      Good cholesterol.  Normal bleeding time.    If you have any questions please feel free to contact [phone] or myself via AnonymAs[employer].    Sincerely,  Vania Monroe, CNP

## 2017-09-05 NOTE — PROGRESS NOTES
"    SUBJECTIVE:   Gladys Singh is a 57 year old female who presents to clinic today for the following health issues:        Medication Followup of Metoprolol     Taking Medication as prescribed: yes    Side Effects:  Some chest pain, anxiety/ unknown    Medication Helping Symptoms:  unknown     Patient complains of continued substernal chest pain, left shoulder pain, upper back pain.  She is unsure if it's related to anxiety or if something else is going on.  She has had the chest pain for \"awhile\".  She recently had a normal stress test.  She also notes continued shortness of breath, particularly with exertion.  She notes the pain more in the afternoon and evening.  Patient notes her palpitations have decreased on metoprolol.  She denies any bleeding episodes from the dabigatran.    Problem list and histories reviewed & adjusted, as indicated.  Additional history: as documented    Patient Active Problem List   Diagnosis     GERD (gastroesophageal reflux disease)     Pedal edema     Major depressive disorder, recurrent episode, severe (H)     Fatigue     Hypothyroidism     Anxiety     Vegetarian     KHUSHI (obstructive sleep apnea)     Hyperparathyroidism (H)     Iron deficiency     Hyperlipidemia LDL goal <100     Papanicolaou smear of cervix with atypical squamous cells cannot exclude high grade squamous intraepithelial lesion (ASC-H)     Constipation, chronic     Hiatal hernia     Thumb pain     New daily persistent headache     Left knee pain     Morbid obesity due to excess calories (H)     Iliotibial band tendinitis of left side     Essential hypertension     Type 2 diabetes mellitus without complication, without long-term current use of insulin (H)     H/O total knee replacement, bilateral     Paroxysmal supraventricular tachycardia (H)     Paroxysmal atrial fibrillation (H)     Current use of long term anticoagulation     Past Surgical History:   Procedure Laterality Date     APPENDECTOMY       " ARTHROSCOPY KNEE RT/LT       C  DELIVERY ONLY      x 2     C TOTAL KNEE ARTHROPLASTY  2004    bilateral     COLONOSCOPY  , ,     early Crohn's     HC COLP CERVIX/UPPER VAGINA W BX CERVIX  ,     neg     HC ESOPHAGOSCOPY, DIAGNOSTIC  , ,     benign, fungal infection     HC REPAIR OF NASAL SEPTUM       HERNIA REPAIR, UMBILICAL       PE TUBES       TONSILLECTOMY & ADENOIDECTOMY       total knee Bilateral 2004    BRAD Mikael Prasad MD       Social History   Substance Use Topics     Smoking status: Former Smoker     Quit date: 1994     Smokeless tobacco: Never Used     Alcohol use No     Family History   Problem Relation Age of Onset     DIABETES Mother      CANCER Mother      endometrial     DIABETES Father      Prostate Cancer Father      C.A.D. Father      DIABETES Maternal Grandmother      CEREBROVASCULAR DISEASE Paternal Grandfather      Cancer - colorectal Sister      Neurologic Disorder Daughter      anxiety     Neurologic Disorder Daughter      anxiety, bipolar         Current Outpatient Prescriptions   Medication Sig Dispense Refill     metoprolol (TOPROL-XL) 25 MG 24 hr tablet Take 0.5 tablets (12.5 mg) by mouth daily 45 tablet 1     simvastatin (ZOCOR) 20 MG tablet TAKE 1 TABLET (20 MG) BY MOUTH AT BEDTIME NEED LABS 90 tablet 1     cyanocobalamin (CVS VITAMIN  B12) 1000 MCG TABS TAKE 1 TABLET (1,000 MCG) BY MOUTH DAILY 90 tablet 1     Ferrous Sulfate (CVS SLOW RELEASE IRON) 143 (45 FE) MG TBCR Take 1 tablet by mouth 2 times daily 180 tablet 1     dabigatran ANTICOAGULANT (PRADAXA) 150 MG capsule Take 1 capsule (150 mg) by mouth twice daily. Store in original 's bottle or blister pack; use within 120 days of opening. 60 capsule 5     escitalopram (LEXAPRO) 20 MG tablet Take 1 tablet (20 mg) by mouth daily 90 tablet 1     ASPIRIN NOT PRESCRIBED (INTENTIONAL) Please choose reason not prescribed, below 0 each 0     BusPIRone HCl (BUSPAR PO) Take 30 mg by mouth 2  times daily       Cholecalciferol (VITAMIN D3) 3000 UNITS TABS        exenatide ER (BYDUREON) 2 MG pen Inject 2 mg Subcutaneous every 7 days       POTASSIUM CHLORIDE 20 MEQ CR tablet TAKE 1 TABLET (20 MEQ) BY MOUTH 2 TIMES DAILY 180 tablet 0     vitamin D (ERGOCALCIFEROL) 09956 UNIT capsule TAKE 1 CAPSULE BY MOUTH ONCE WEEKLY. 8 capsule 1     furosemide (LASIX) 40 MG tablet TAKE 1 TABLET (40 MG) BY MOUTH DAILY 90 tablet 0     blood glucose monitoring (SAMUEL MICROLET) lancets TEST ONCE DAILY OR AS NEEDED *DISPENSE WHATEVER IS COVERED BY INS* 1 Box 3     calcium carbonate (OSCAL 500) 1250 (500 CA) MG TABS tablet TAKE 1 TABLET BY MOUTH. THREE TIMES DAILY. 270 tablet 0     lisinopril (PRINIVIL/ZESTRIL) 10 MG tablet TAKE 1 TABLET (10 MG) BY MOUTH DAILY 90 tablet 1     ranitidine (ZANTAC) 150 MG tablet TAKE 1 TABLET (150 MG) BY MOUTH 2 TIMES DAILY 60 tablet 5     Multiple Vitamins-Minerals (CVS SPECTRAVITE ULTRA WOMEN) TABS TAKE 1 TABLET BY MOUTH DAILY 90 tablet 3     blood glucose monitoring (NO BRAND SPECIFIED) test strip Use to test blood sugars one times daily or as directed 100 strip 3     blood glucose monitoring (NO BRAND SPECIFIED) meter device kit Use to test blood sugar one times daily or as directed. 1 kit 0     blood glucose (NO BRAND SPECIFIED) lancets standard Use to test blood sugar one times daily or as directed. 1 Box 3     omeprazole (PRILOSEC) 20 MG CR capsule Take 1 capsule (20 mg) by mouth 2 times daily 180 capsule 3     levothyroxine (SYNTHROID/LEVOTHROID) 75 MCG tablet Take 1 tablet (75 mcg) by mouth daily 90 tablet 3     blood glucose monitoring (NO BRAND SPECIFIED) meter device kit Use to test blood sugar one times daily or as directed. 1 kit 0     blood glucose (NO BRAND SPECIFIED) lancets standard Use to test blood sugar one times daily or as directed. 1 Box 0     blood glucose monitoring (NO BRAND SPECIFIED) test strip Use to test blood sugars one times daily or as directed 100 strip 3      docusate sodium (COLACE) 100 MG capsule TAKE 1 CAPSULE (100 MG) BY MOUTH 2 TIMES DAILY 180 capsule 1     insulin pen needle (B-D U/F) 31G X 5 MM Use once daily or as directed. 100 each prn     order for DME Test strips for pt's glucometer, brand as covered by insurance. Test twice daily or PRN. 1 Box prn     LORazepam (ATIVAN) 1 MG tablet Take 1 mg by mouth At Bedtime        modafinil (PROVIGIL) 200 MG tablet Take 0.5 tablets by mouth daily.       AMBIEN 10 MG OR TABS 1 TABLET DAILY AT BEDTIME       [DISCONTINUED] metoprolol (TOPROL-XL) 25 MG 24 hr tablet Take 0.5 tablets (12.5 mg) by mouth daily 15 tablet 1     Allergies   Allergen Reactions     Augmentin GI Disturbance     Victoza Other (See Comments)     Abdominal pain     Recent Labs   Lab Test  09/01/17   1520  08/18/17   1010  12/06/16   0754  11/21/16   1538  10/27/16   1151 08/09/16 07/07/16   0730  04/07/16   0743   06/15/15   1427  02/27/15   1112   A1C   --    --   6.0   --    --   5.8   --   6.4*   --   5.9  5.9   LDL  85   --    --    --    --    --   77  113*   --    --   66   HDL   --    --    --    --    --    --   39*  46*   --    --   48*   TRIG   --    --    --    --    --    --   185*  133   --    --   191*   ALT   --   44   --   38  35   --    --    --    < >   --    --    CR   --   0.90   --   0.81  0.77   --   0.65   --    < >   --   0.74   GFRESTIMATED   --   64   --   72  78   --   >90  Non  GFR Calc     --    < >   --   81   GFRESTBLACK   --   78   --   87  >90   GFR Calc     --   >90   GFR Calc     --    < >   --   >90   GFR Calc     POTASSIUM   --   4.4   --   3.5  4.3   --   4.2   --    < >   --   4.0   TSH   --    --    --    --    --    --   1.97   --    --   1.12  1.69    < > = values in this interval not displayed.      BP Readings from Last 3 Encounters:   09/08/17 116/76   09/01/17 100/70   08/30/17 117/82    Wt Readings from Last 3 Encounters:   09/08/17 285 lb  "(129.3 kg)   09/01/17 283 lb 6.4 oz (128.5 kg)   08/18/17 286 lb (129.7 kg)                  Labs reviewed in EPIC          Reviewed and updated as needed this visit by clinical staffAllergies       Reviewed and updated as needed this visit by Provider         ROS:  Constitutional, HEENT, cardiovascular, pulmonary, gi and gu systems are negative, except as otherwise noted.      OBJECTIVE:   /76  Pulse 64  Temp 97.3  F (36.3  C) (Oral)  Ht 5' 5.25\" (1.657 m)  Wt 285 lb (129.3 kg)  LMP 02/01/2010  SpO2 97%  BMI 47.06 kg/m2  Body mass index is 47.06 kg/(m^2).  GENERAL: healthy, alert and no distress  RESP: lungs clear to auscultation - no rales, rhonchi or wheezes  CV: regular rate and rhythm, normal S1 S2, no S3 or S4, no murmur, click or rub, no peripheral edema and peripheral pulses strong  MS: no gross musculoskeletal defects noted, no edema  PSYCH: mentation appears normal and anxious    Diagnostic Test Results:  pending    ASSESSMENT/PLAN:     1. Atypical chest pain  Will continue further workup with CT chest and consider echocardiogram in the future.  Possibly anxiety as well.  Patient to continue follow-up with psychiatry for anxiety as well.  - CT Chest w Contrast; Future    2. SOB (shortness of breath)  As above.   - CT Chest w Contrast; Future    3. Paroxysmal supraventricular tachycardia (H)  Rate well controlled today.  Patient reassured.    4. Paroxysmal atrial fibrillation (H)  As above.   - metoprolol (TOPROL-XL) 25 MG 24 hr tablet; Take 0.5 tablets (12.5 mg) by mouth daily  Dispense: 45 tablet; Refill: 1    5. Current use of long term anticoagulation  Continue dabigatran.      FUTURE APPOINTMENTS:       - Follow-up for annual visit or as needed    JACKELINE [facility] CHEN    "

## 2017-09-08 ENCOUNTER — OFFICE VISIT (OUTPATIENT)
Dept: INTERNAL MEDICINE | Facility: CLINIC | Age: 57
End: 2017-09-08
Payer: COMMERCIAL

## 2017-09-08 ENCOUNTER — RADIANT APPOINTMENT (OUTPATIENT)
Dept: CT IMAGING | Facility: CLINIC | Age: 57
End: 2017-09-08
Attending: NURSE PRACTITIONER
Payer: COMMERCIAL

## 2017-09-08 VITALS
SYSTOLIC BLOOD PRESSURE: 116 MMHG | DIASTOLIC BLOOD PRESSURE: 76 MMHG | HEIGHT: 65 IN | BODY MASS INDEX: 47.48 KG/M2 | TEMPERATURE: 97.3 F | WEIGHT: 285 LBS | HEART RATE: 64 BPM | OXYGEN SATURATION: 97 %

## 2017-09-08 DIAGNOSIS — R07.89 ATYPICAL CHEST PAIN: Primary | ICD-10-CM

## 2017-09-08 DIAGNOSIS — I47.10 PAROXYSMAL SUPRAVENTRICULAR TACHYCARDIA (H): ICD-10-CM

## 2017-09-08 DIAGNOSIS — R06.02 SOB (SHORTNESS OF BREATH): ICD-10-CM

## 2017-09-08 DIAGNOSIS — R07.89 ATYPICAL CHEST PAIN: ICD-10-CM

## 2017-09-08 DIAGNOSIS — Z79.01 CURRENT USE OF LONG TERM ANTICOAGULATION: ICD-10-CM

## 2017-09-08 DIAGNOSIS — I48.0 PAROXYSMAL ATRIAL FIBRILLATION (H): ICD-10-CM

## 2017-09-08 PROCEDURE — 71260 CT THORAX DX C+: CPT | Mod: TC

## 2017-09-08 PROCEDURE — 99214 OFFICE O/P EST MOD 30 MIN: CPT | Performed by: NURSE PRACTITIONER

## 2017-09-08 RX ORDER — IOPAMIDOL 755 MG/ML
80 INJECTION, SOLUTION INTRAVASCULAR ONCE
Status: COMPLETED | OUTPATIENT
Start: 2017-09-08 | End: 2017-09-08

## 2017-09-08 RX ORDER — METOPROLOL SUCCINATE 25 MG/1
12.5 TABLET, EXTENDED RELEASE ORAL DAILY
Qty: 45 TABLET | Refills: 1 | Status: SHIPPED | OUTPATIENT
Start: 2017-09-08 | End: 2017-10-04 | Stop reason: ALTCHOICE

## 2017-09-08 RX ADMIN — IOPAMIDOL 80 ML: 755 INJECTION, SOLUTION INTRAVASCULAR at 14:06

## 2017-09-08 NOTE — PATIENT INSTRUCTIONS
[facility]    If you have any questions regarding to your visit please contact your care team:     Team Pink:   Clinic Hours Telephone Number   Internal Medicine:  Dr. Octavia Monroe NP       7am-7pm  Monday - Thursday   7am-5pm  Fridays  [phone]  (Appointment scheduling available 24/7)    Questions about your visit?  Team Line  [phone]   Urgent Care - [place] [facility] - 11am-9pm Monday-Friday Saturday-Sunday- 9am-5pm   [place] - 5pm-9pm Monday-Friday Saturday-Sunday- 9am-5pm  [phone] - [place]   [phone] - [place]       What options do I have for visits at the clinic other than the traditional office visit?  To expand how we care for you, many of our providers are utilizing electronic visits (e-visits) and telephone visits, when medically appropriate, for interactions with their patients rather than a visit in the clinic.   We also offer nurse visits for many medical concerns. Just like any other service, we will bill your insurance company for this type of visit based on time spent on the phone with your provider. Not all insurance companies cover these visits. Please check with your medical insurance if this type of visit is covered. You will be responsible for any charges that are not paid by your insurance.      E-visits via beqom:  generally incur a $35.00 fee.  Telephone visits:  Time spent on the phone: *charged based on time that is spent on the phone in increments of 10 minutes. Estimated cost:   5-10 mins $30.00   11-20 mins. $59.00   21-30 mins. $85.00   Use SeeClickFixt (secure email communication and access to your chart) to send your primary care provider a message or make an appointment. Ask someone on your Team how to sign up for beqom.    For a Price Quote for your services, please call our Consumer Price Line at [phone].    As always, Thank you for trusting us with your health care  needs!    Discharged by Caitlyn CHISHOLM CMA ([facility])

## 2017-09-08 NOTE — Clinical Note
Patient had labs from [facility] scanned in from 5/15/17.  Can we abstract TSH, HGBA1c into chart at all to satisfy quality?  Thanks, Vania Monroe, CNP

## 2017-09-08 NOTE — NURSING NOTE
"Chief Complaint   Patient presents with     RECHECK     1 week follow-up for A-Fib       Initial /76  Pulse 64  Temp 97.3  F (36.3  C) (Oral)  Ht 5' 5.25\" (1.657 m)  Wt 285 lb (129.3 kg)  LMP 02/01/2010  SpO2 97%  BMI 47.06 kg/m2 Estimated body mass index is 47.06 kg/(m^2) as calculated from the following:    Height as of this encounter: 5' 5.25\" (1.657 m).    Weight as of this encounter: 285 lb (129.3 kg).  Medication Reconciliation: complete   Caitlyn CHISHOLM CMA ([facility])      "

## 2017-09-08 NOTE — MR AVS SNAPSHOT
After Visit Summary   9/8/2017    Gladys Singh    MRN: [id number]           Patient Information     Date Of Birth          1960        Visit Information        Provider Department      9/8/2017 10:40 AM Vania Monroe APRN [facility]        Today's Diagnoses     Atypical chest pain    -  1    SOB (shortness of breath)        Paroxysmal supraventricular tachycardia (H)        Paroxysmal atrial fibrillation (H)        Current use of long term anticoagulation          Care Instructions    [facility]    If you have any questions regarding to your visit please contact your care team:     Team Pink:   Clinic Hours Telephone Number   Internal Medicine:  Dr. Octavia Monroe, NP       7am-7pm  Monday - Thursday   7am-5pm  Fridays  [phone]  (Appointment scheduling available 24/7)    Questions about your visit?  Team Line  [phone]   Ur[facility] - [place] [facility] and [facility] - 11am-9pm Monday-Friday Saturday-Sunday- 9am-5pm   [place] - 5pm-9pm Monday-Friday Saturday-Sunday- 9am-5pm  [phone] - Michelle   [phone] - [place]       What options do I have for visits at the clinic other than the traditional office visit?  To expand how we care for you, many of our providers are utilizing electronic visits (e-visits) and telephone visits, when medically appropriate, for interactions with their patients rather than a visit in the clinic.   We also offer nurse visits for many medical concerns. Just like any other service, we will bill your insurance company for this type of visit based on time spent on the phone with your provider. Not all insurance companies cover these visits. Please check with your medical insurance if this type of visit is covered. You will be responsible for any charges that are not paid by your insurance.      E-visits via Hello Universe:  generally incur a $35.00 fee.  Telephone  visits:  Time spent on the phone: *charged based on time that is spent on the phone in increments of 10 minutes. Estimated cost:   5-10 mins $30.00   11-20 mins. $59.00   21-30 mins. $85.00   Use [employer] (secure email communication and access to your chart) to send your primary care provider a message or make an appointment. Ask someone on your Team how to sign up for [employer].    For a Price Quote for your services, please call our EZ-Ticket Line at [phone].    As always, Thank you for trusting us with your health care needs!    Discharged by Caitlyn CHISHOLM CMA ([facility])            Follow-ups after your visit        Future tests that were ordered for you today     Open Future Orders        Priority Expected Expires Ordered    CT Chest w Contrast Routine  9/8/2018 9/8/2017            Who to contact     If you have questions or need follow up information about today's clinic visit or your schedule please contact [facility] directly at [phone].  Normal or non-critical lab and imaging results will be communicated to you by MyChart, letter or phone within 4 business days after the clinic has received the results. If you do not hear from us within 7 days, please contact the clinic through TimeBridgehart or phone. If you have a critical or abnormal lab result, we will notify you by phone as soon as possible.  Submit refill requests through [employer] or call your pharmacy and they will forward the refill request to us. Please allow 3 business days for your refill to be completed.          Additional Information About Your Visit        TimeBridgehart Information     [employer] gives you secure access to your electronic health record. If you see a primary care provider, you can also send messages to your care team and make appointments. If you have questions, please call your primary care clinic.  If you do not have a primary care provider, please call [phone] and they will assist you.        Care EveryWhere ID      "This is your Care EveryWhere ID. This could be used by other organizations to access your [place] medical records  [id number]        Your Vitals Were     Pulse Temperature Height Last Period Pulse Oximetry BMI (Body Mass Index)    64 97.3  F (36.3  C) (Oral) 5' 5.25\" (1.657 m) 02/01/2010 97% 47.06 kg/m2       Blood Pressure from Last 3 Encounters:   09/08/17 116/76   09/01/17 100/70   08/30/17 117/82    Weight from Last 3 Encounters:   09/08/17 285 lb (129.3 kg)   09/01/17 283 lb 6.4 oz (128.5 kg)   08/18/17 286 lb (129.7 kg)                 Where to get your medicines      These medications were sent to [facility] #[id number] - [place], [place] - [address] [address]     Phone:  [phone]     metoprolol 25 MG 24 hr tablet          Primary Care Provider Office Phone # Fax #    Vania Roxy Monroe, APRN [facility] [phone] [address] [facility] [address]        Equal Access to Services     CAYETANO URBANO AH: Hadii nemo blandon hadalexiso Soomaali, waaxda luqadaha, qaybta kaalmada adeegyada, chelsie mata. So [facility] [phone].    ATENCIÓN: Si habla español, tiene a eason disposición servicios gratuitos de asistencia lingüística. [facility] [phone].    We comply with applicable federal civil rights laws and [place] laws. We do not discriminate on the basis of race, color, national origin, age, disability sex, sexual orientation or gender identity.            Thank you!     Thank you for choosing [facility]  for your care. Our goal is always to provide you with excellent care. Hearing back from our patients is one way we can continue to improve our services. Please take a few minutes to complete the written survey that you may receive in the mail after your visit with us. Thank you!             Your Updated Medication List - Protect others around you: Learn how to safely use, store and throw away your medicines at " www.disposemymeds.org.          This list is accurate as of: 9/8/17 11:19 AM.  Always use your most recent med list.                   Brand Name Dispense Instructions for use Diagnosis    AMBIEN 10 MG tablet   Generic drug:  zolpidem      1 TABLET DAILY AT BEDTIME        ASPIRIN NOT PRESCRIBED    INTENTIONAL    0 each    Please choose reason not prescribed, below    Paroxysmal atrial fibrillation (H)       * blood glucose lancets standard    no brand specified    1 Box    Use to test blood sugar one times daily or as directed.    Type 2 diabetes mellitus without complication, without long-term current use of insulin (H)       * blood glucose lancets standard    no brand specified    1 Box    Use to test blood sugar one times daily or as directed.    Type 2 diabetes mellitus without complication, without long-term current use of insulin (H)       blood glucose monitoring lancets     1 Box    TEST ONCE DAILY OR AS NEEDED *DISPENSE WHATEVER IS COVERED BY INS*    Type 2 diabetes mellitus without complication, without long-term current use of insulin (H)       * blood glucose monitoring meter device kit    no brand specified    1 kit    Use to test blood sugar one times daily or as directed.    Type 2 diabetes mellitus without complication, without long-term current use of insulin (H)       * blood glucose monitoring meter device kit    no brand specified    1 kit    Use to test blood sugar one times daily or as directed.    Type 2 diabetes mellitus without complication, without long-term current use of insulin (H)       * blood glucose monitoring test strip    no brand specified    100 strip    Use to test blood sugars one times daily or as directed    Type 2 diabetes mellitus without complication, without long-term current use of insulin (H)       * blood glucose monitoring test strip    no brand specified    100 strip    Use to test blood sugars one times daily or as directed    Type 2 diabetes mellitus without  complication, without long-term current use of insulin (H)       BUSPAR PO      Take 30 mg by mouth 2 times daily        BYDUREON 2 MG pen   Generic drug:  exenatide ER      Inject 2 mg Subcutaneous every 7 days        calcium carbonate 1250 (500 CA) MG Tabs tablet    OSCAL 500    270 tablet    TAKE 1 TABLET BY MOUTH. THREE TIMES DAILY.    Elevated PTHrP level (H)       CVS SPECTRAVITE ULTRA WOMEN Tabs     90 tablet    TAKE 1 TABLET BY MOUTH DAILY    Constipation, chronic       cyanocobalamin 1000 MCG Tabs    CVS vitamin  B12    90 tablet    TAKE 1 TABLET (1,000 MCG) BY MOUTH DAILY    Vitamin B12 deficiency (non anemic)       dabigatran ANTICOAGULANT 150 MG capsule    PRADAXA    60 capsule    Take 1 capsule (150 mg) by mouth twice daily. Store in original 's bottle or blister pack; use within 120 days of opening.    Paroxysmal atrial fibrillation (H)       docusate sodium 100 MG capsule    COLACE    180 capsule    TAKE 1 CAPSULE (100 MG) BY MOUTH 2 TIMES DAILY    Constipation, unspecified constipation type       escitalopram 20 MG tablet    LEXAPRO    90 tablet    Take 1 tablet (20 mg) by mouth daily    Paroxysmal atrial fibrillation (H)       Ferrous Sulfate 143 (45 FE) MG Tbcr    CVS SLOW RELEASE IRON    180 tablet    Take 1 tablet by mouth 2 times daily    Iron deficiency       furosemide 40 MG tablet    LASIX    90 tablet    TAKE 1 TABLET (40 MG) BY MOUTH DAILY    Pedal edema       insulin pen needle 31G X 5 MM    B-D U/F    100 each    Use once daily or as directed.    Type 2 diabetes mellitus without complication (H)       levothyroxine 75 MCG tablet    SYNTHROID/LEVOTHROID    90 tablet    Take 1 tablet (75 mcg) by mouth daily    Acquired hypothyroidism, Vitamin D deficiency, Hyperparathyroidism (H), Abdominal pain, epigastric       lisinopril 10 MG tablet    PRINIVIL/ZESTRIL    90 tablet    TAKE 1 TABLET (10 MG) BY MOUTH DAILY    Type 2 diabetes mellitus without complication, without long-term  current use of insulin (H), Essential hypertension with goal blood pressure less than 140/90       LORazepam 1 MG tablet    ATIVAN     Take 1 mg by mouth At Bedtime        metoprolol 25 MG 24 hr tablet    TOPROL-XL    45 tablet    Take 0.5 tablets (12.5 mg) by mouth daily    Paroxysmal atrial fibrillation (H)       omeprazole 20 MG CR capsule    priLOSEC    180 capsule    Take 1 capsule (20 mg) by mouth 2 times daily    Hiatal hernia       order for DME     1 Box    Test strips for pt's glucometer, brand as covered by insurance. Test twice daily or PRN.    Type 2 diabetes mellitus without complication (H)       potassium chloride 20 MEQ CR tablet   Generic drug:  potassium chloride SA     180 tablet    TAKE 1 TABLET (20 MEQ) BY MOUTH 2 TIMES DAILY    Diuresis       PROVIGIL 200 MG tablet   Generic drug:  modafinil      Take 0.5 tablets by mouth daily.        ranitidine 150 MG tablet    ZANTAC    60 tablet    TAKE 1 TABLET (150 MG) BY MOUTH 2 TIMES DAILY    Gastroesophageal reflux disease, esophagitis presence not specified       simvastatin 20 MG tablet    ZOCOR    90 tablet    TAKE 1 TABLET (20 MG) BY MOUTH AT BEDTIME NEED LABS    Hyperlipidemia LDL goal <100       vitamin D 03496 UNIT capsule    ERGOCALCIFEROL    8 capsule    TAKE 1 CAPSULE BY MOUTH ONCE WEEKLY.    Vitamin D deficiency, Hyperparathyroidism (H), Acquired hypothyroidism, Abdominal pain, epigastric       Vitamin D3 3000 UNITS Tabs           * Notice:  This list has 6 medication(s) that are the same as other medications prescribed for you. Read the directions carefully, and ask your doctor or other care provider to review them with you.

## 2017-09-12 ENCOUNTER — RADIANT APPOINTMENT (OUTPATIENT)
Dept: CARDIOLOGY | Facility: CLINIC | Age: 57
End: 2017-09-12
Attending: NURSE PRACTITIONER
Payer: COMMERCIAL

## 2017-09-12 DIAGNOSIS — R06.02 SOB (SHORTNESS OF BREATH): ICD-10-CM

## 2017-09-12 PROCEDURE — 93306 TTE W/DOPPLER COMPLETE: CPT | Mod: GC | Performed by: INTERNAL MEDICINE

## 2017-09-12 NOTE — PROGRESS NOTES
Dear Gladys,    Your recent test results are attached.      Normal echocardiogram.    If you have any questions please feel free to contact [phone] or myself via Finelinet.    Sincerely,  Vania Monroe, CNP

## 2017-09-18 NOTE — PROGRESS NOTES
SUBJECTIVE:   Gladys Singh is a 57 year old female who presents to clinic today for the following health issues:      CHEST PAIN     Onset: 2 weeks ago    Description:   Location:  left side  Character: tight  Radiation: from left side over shoulder and down left arm  Duration: intermittent     Intensity: moderate    Progression of Symptoms:  same    Accompanying Signs & Symptoms:  Shortness of breath: YES  Sweating: no   Nausea/vomiting: no   Lightheadedness: YES  Palpitations: YES    Fever/Chills: no   Cough: no   Heartburn: no     History:   Family history of heart disease no   Tobacco use: no     Precipitating factors:   Worse with exertion: no   Worse with deep breaths :  no   Related to food: no     Alleviating factors: Unknown      Therapies Tried and outcome: New Medication Beta Blocker    Patient continues to have intermittent left sided chest pain and has noticed increased palpitations.  Patient recently had normal stress test, Chest CT, echocardiogram.  She feels she gets winded easier.  Symptoms were initially thought to be due to anxiety by psychiatry and her meds were changed.  An ekg showed NSR with PAC's and ZIO showed intermittent atrial fibrillation.  Pain in chest is not worsened by movement.  Patient mostly notices symptoms with lying down.  Patient's symptoms were initially improved on beta-blocker, but have since returned to normal.  Patient has a history of smoking.  She quit in 1994 and smoked for 13 years.  She smoked 1.5 ppd.  Patient is having difficulty discerning if her symptoms are related to atrial fibrillation or anxiety.  Patient feels her reflux is well controlled.    Problem list and histories reviewed & adjusted, as indicated.  Additional history: as documented    Patient Active Problem List   Diagnosis     GERD (gastroesophageal reflux disease)     Pedal edema     Major depressive disorder, recurrent episode, severe (H)     Fatigue     Hypothyroidism     Anxiety      Vegetarian     KHUSHI (obstructive sleep apnea)     Hyperparathyroidism (H)     Iron deficiency     Hyperlipidemia LDL goal <100     Papanicolaou smear of cervix with atypical squamous cells cannot exclude high grade squamous intraepithelial lesion (ASC-H)     Constipation, chronic     Hiatal hernia     Thumb pain     New daily persistent headache     Left knee pain     Morbid obesity due to excess calories (H)     Iliotibial band tendinitis of left side     Essential hypertension     Type 2 diabetes mellitus without complication, without long-term current use of insulin (H)     H/O total knee replacement, bilateral     Paroxysmal supraventricular tachycardia (H)     Paroxysmal atrial fibrillation (H)     Current use of long term anticoagulation     Past Surgical History:   Procedure Laterality Date     APPENDECTOMY       ARTHROSCOPY KNEE RT/LT       C  DELIVERY ONLY      x 2     C TOTAL KNEE ARTHROPLASTY  2004    bilateral     COLONOSCOPY  , ,     early Crohn's     HC COLP CERVIX/UPPER VAGINA W BX CERVIX  ,     neg     HC ESOPHAGOSCOPY, DIAGNOSTIC  , ,     benign, fungal infection     HC REPAIR OF NASAL SEPTUM       HERNIA REPAIR, UMBILICAL       PE TUBES       TONSILLECTOMY & ADENOIDECTOMY       total knee Bilateral 2004    BRAD Mikael Prasad MD       Social History   Substance Use Topics     Smoking status: Former Smoker     Quit date: 1994     Smokeless tobacco: Never Used     Alcohol use No     Family History   Problem Relation Age of Onset     DIABETES Mother      CANCER Mother      endometrial     DIABETES Father      Prostate Cancer Father      C.A.D. Father      DIABETES Maternal Grandmother      CEREBROVASCULAR DISEASE Paternal Grandfather      Cancer - colorectal Sister      Neurologic Disorder Daughter      anxiety     Neurologic Disorder Daughter      anxiety, bipolar         Current Outpatient Prescriptions   Medication Sig Dispense Refill     ranitidine (ZANTAC)  150 MG tablet TAKE 1 TABLET (150 MG) BY MOUTH 2 TIMES DAILY 60 tablet 5     metoprolol (TOPROL-XL) 25 MG 24 hr tablet Take 0.5 tablets (12.5 mg) by mouth daily 45 tablet 1     simvastatin (ZOCOR) 20 MG tablet TAKE 1 TABLET (20 MG) BY MOUTH AT BEDTIME NEED LABS 90 tablet 1     cyanocobalamin (CVS VITAMIN  B12) 1000 MCG TABS TAKE 1 TABLET (1,000 MCG) BY MOUTH DAILY 90 tablet 1     Ferrous Sulfate (CVS SLOW RELEASE IRON) 143 (45 FE) MG TBCR Take 1 tablet by mouth 2 times daily 180 tablet 1     dabigatran ANTICOAGULANT (PRADAXA) 150 MG capsule Take 1 capsule (150 mg) by mouth twice daily. Store in original 's bottle or blister pack; use within 120 days of opening. 60 capsule 5     escitalopram (LEXAPRO) 20 MG tablet Take 1 tablet (20 mg) by mouth daily 90 tablet 1     ASPIRIN NOT PRESCRIBED (INTENTIONAL) Please choose reason not prescribed, below 0 each 0     BusPIRone HCl (BUSPAR PO) Take 30 mg by mouth 2 times daily       Cholecalciferol (VITAMIN D3) 3000 UNITS TABS        exenatide ER (BYDUREON) 2 MG pen Inject 2 mg Subcutaneous every 7 days       POTASSIUM CHLORIDE 20 MEQ CR tablet TAKE 1 TABLET (20 MEQ) BY MOUTH 2 TIMES DAILY 180 tablet 0     vitamin D (ERGOCALCIFEROL) 17103 UNIT capsule TAKE 1 CAPSULE BY MOUTH ONCE WEEKLY. 8 capsule 1     furosemide (LASIX) 40 MG tablet TAKE 1 TABLET (40 MG) BY MOUTH DAILY 90 tablet 0     blood glucose monitoring (SAMUEL MICROLET) lancets TEST ONCE DAILY OR AS NEEDED *DISPENSE WHATEVER IS COVERED BY INS* 1 Box 3     calcium carbonate (OSCAL 500) 1250 (500 CA) MG TABS tablet TAKE 1 TABLET BY MOUTH. THREE TIMES DAILY. 270 tablet 0     lisinopril (PRINIVIL/ZESTRIL) 10 MG tablet TAKE 1 TABLET (10 MG) BY MOUTH DAILY 90 tablet 1     Multiple Vitamins-Minerals (CVS SPECTRAVITE ULTRA WOMEN) TABS TAKE 1 TABLET BY MOUTH DAILY 90 tablet 3     omeprazole (PRILOSEC) 20 MG CR capsule Take 1 capsule (20 mg) by mouth 2 times daily 180 capsule 3     levothyroxine (SYNTHROID/LEVOTHROID)  "75 MCG tablet Take 1 tablet (75 mcg) by mouth daily 90 tablet 3     blood glucose monitoring (NO BRAND SPECIFIED) meter device kit Use to test blood sugar one times daily or as directed. 1 kit 0     blood glucose monitoring (NO BRAND SPECIFIED) test strip Use to test blood sugars one times daily or as directed 100 strip 3     docusate sodium (COLACE) 100 MG capsule TAKE 1 CAPSULE (100 MG) BY MOUTH 2 TIMES DAILY 180 capsule 1     insulin pen needle (B-D U/F) 31G X 5 MM Use once daily or as directed. 100 each prn     order for DME Test strips for pt's glucometer, brand as covered by insurance. Test twice daily or PRN. 1 Box prn     LORazepam (ATIVAN) 1 MG tablet Take 1 mg by mouth At Bedtime        modafinil (PROVIGIL) 200 MG tablet Take 0.5 tablets by mouth daily.       AMBIEN 10 MG OR TABS 1 TABLET DAILY AT BEDTIME       Allergies   Allergen Reactions     Augmentin GI Disturbance     Victoza Other (See Comments)     Abdominal pain     BP Readings from Last 3 Encounters:   09/20/17 118/80   09/08/17 116/76   09/01/17 100/70    Wt Readings from Last 3 Encounters:   09/20/17 286 lb (129.7 kg)   09/08/17 285 lb (129.3 kg)   09/01/17 283 lb 6.4 oz (128.5 kg)                  Labs reviewed in EPIC        Reviewed and updated as needed this visit by clinical staff       Reviewed and updated as needed this visit by Provider         ROS:  Constitutional, HEENT, cardiovascular, pulmonary, gi and gu systems are negative, except as otherwise noted.      OBJECTIVE:   /80  Pulse 68  Temp 97.3  F (36.3  C) (Oral)  Ht 5' 5.25\" (1.657 m)  Wt 286 lb (129.7 kg)  LMP 02/01/2010  SpO2 95%  BMI 47.23 kg/m2  Body mass index is 47.23 kg/(m^2).  GENERAL: healthy, alert and no distress  RESP: lungs clear to auscultation - no rales, rhonchi or wheezes  CV: regular rate and rhythm, normal S1 S2, no S3 or S4, no murmur, click or rub, no peripheral edema and peripheral pulses strong  MS: Left chest wall non-tender to palpation, full " range of motion of left shoulder without pain.    Diagnostic Test Results:  Spirometry- unable to load final results to computer, but I visualized normal spirometry report.    ASSESSMENT/PLAN:     1. Paroxysmal atrial fibrillation (H)  Patient to follow-up with cardiology regarding atypical chest pain, atrial fibrillation, SVT.    2. Paroxysmal supraventricular tachycardia (H)  As above.     3. Dyspnea on exertion  Normal spirometry today.  - Spirometry, Breathing Capacity  - Spirometry, Breathing Capacity  - Spirometry, Breathing Capacity    4. Atypical chest pain  Follow-up with cardiology as planned.    5. Gastroesophageal reflux disease, esophagitis presence not specified    - ranitidine (ZANTAC) 150 MG tablet; TAKE 1 TABLET (150 MG) BY MOUTH 2 TIMES DAILY  Dispense: 60 tablet; Refill: 5    FUTURE APPOINTMENTS:       - Follow-up for annual visit or as needed    JACKELINE Hare [facility]

## 2017-09-20 ENCOUNTER — OFFICE VISIT (OUTPATIENT)
Dept: INTERNAL MEDICINE | Facility: CLINIC | Age: 57
End: 2017-09-20
Payer: COMMERCIAL

## 2017-09-20 VITALS
WEIGHT: 286 LBS | DIASTOLIC BLOOD PRESSURE: 80 MMHG | HEART RATE: 68 BPM | OXYGEN SATURATION: 95 % | BODY MASS INDEX: 47.65 KG/M2 | TEMPERATURE: 97.3 F | SYSTOLIC BLOOD PRESSURE: 118 MMHG | HEIGHT: 65 IN

## 2017-09-20 DIAGNOSIS — I48.0 PAROXYSMAL ATRIAL FIBRILLATION (H): Primary | ICD-10-CM

## 2017-09-20 DIAGNOSIS — K21.9 GASTROESOPHAGEAL REFLUX DISEASE, ESOPHAGITIS PRESENCE NOT SPECIFIED: ICD-10-CM

## 2017-09-20 DIAGNOSIS — R06.09 DYSPNEA ON EXERTION: ICD-10-CM

## 2017-09-20 DIAGNOSIS — I47.10 PAROXYSMAL SUPRAVENTRICULAR TACHYCARDIA (H): ICD-10-CM

## 2017-09-20 DIAGNOSIS — R07.89 ATYPICAL CHEST PAIN: ICD-10-CM

## 2017-09-20 LAB
FEF 25/75: NORMAL
FEV-1: NORMAL
FEV1/FVC: NORMAL
FVC: NORMAL

## 2017-09-20 PROCEDURE — 94010 BREATHING CAPACITY TEST: CPT | Performed by: NURSE PRACTITIONER

## 2017-09-20 PROCEDURE — 99213 OFFICE O/P EST LOW 20 MIN: CPT | Mod: 25 | Performed by: NURSE PRACTITIONER

## 2017-09-20 NOTE — PATIENT INSTRUCTIONS
[facility]    If you have any questions regarding to your visit please contact your care team:     Team Pink:   Clinic Hours Telephone Number   Internal Medicine:  Dr. Octavia Monroe NP       7am-7pm  Monday - Thursday   7am-5pm  Fridays  [phone]  (Appointment scheduling available 24/7)    Questions about your visit?  Team Line  [phone]   Urgent Care - [facility] Gomez and [facility] - 11am-9pm Monday-Friday Saturday-Sunday- 9am-5pm   [place] - 5pm-9pm Monday-Friday Saturday-Sunday- 9am-5pm  [phone] - Michelle   [phone] - [place]       What options do I have for visits at the clinic other than the traditional office visit?  To expand how we care for you, many of our providers are utilizing electronic visits (e-visits) and telephone visits, when medically appropriate, for interactions with their patients rather than a visit in the clinic.   We also offer nurse visits for many medical concerns. Just like any other service, we will bill your insurance company for this type of visit based on time spent on the phone with your provider. Not all insurance companies cover these visits. Please check with your medical insurance if this type of visit is covered. You will be responsible for any charges that are not paid by your insurance.      E-visits via InnoCentive:  generally incur a $35.00 fee.  Telephone visits:  Time spent on the phone: *charged based on time that is spent on the phone in increments of 10 minutes. Estimated cost:   5-10 mins $30.00   11-20 mins. $59.00   21-30 mins. $85.00   Use [employer] (secure email communication and access to your chart) to send your primary care provider a message or make an appointment. Ask someone on your Team how to sign up for InnoCentive.    For a Price Quote for your services, please call our Consumer Price Line at [phone].    As always, Thank you for trusting us with your health care  needs!    Discharged by Caitlyn CHISHOLM CMA ([facility])

## 2017-09-20 NOTE — MR AVS SNAPSHOT
After Visit Summary   9/20/2017    Gladys Singh    MRN: [id number]           Patient Information     Date Of Birth          1960        Visit Information        Provider Department      9/20/2017 11:40 AM Vania Monroe APRN [facility]        Today's Diagnoses     Paroxysmal atrial fibrillation (H)    -  1    Paroxysmal supraventricular tachycardia (H)        Dyspnea on exertion        Atypical chest pain        Gastroesophageal reflux disease, esophagitis presence not specified          Care Instructions    [facility]    If you have any questions regarding to your visit please contact your care team:     Team Pink:   Clinic Hours Telephone Number   Internal Medicine:  Dr. Octavia Monroe, NP       7am-7pm  Monday - Thursday   7am-5pm  Fridays  [phone]  (Appointment scheduling available 24/7)    Questions about your visit?  Team Line  [phone]   [facility] 11am-9pm Monday-Friday Saturday-Sunday- 9am-5pm   [place] - 5pm-9pm Monday-Friday Saturday-Sunday- 9am-5pm  [phone] - [place]   [phone] - [place]       What options do I have for visits at the clinic other than the traditional office visit?  To expand how we care for you, many of our providers are utilizing electronic visits (e-visits) and telephone visits, when medically appropriate, for interactions with their patients rather than a visit in the clinic.   We also offer nurse visits for many medical concerns. Just like any other service, we will bill your insurance company for this type of visit based on time spent on the phone with your provider. Not all insurance companies cover these visits. Please check with your medical insurance if this type of visit is covered. You will be responsible for any charges that are not paid by your insurance.      E-visits via OnGreen:  generally incur a  $35.00 fee.  Telephone visits:  Time spent on the phone: *charged based on time that is spent on the phone in increments of 10 minutes. Estimated cost:   5-10 mins $30.00   11-20 mins. $59.00   21-30 mins. $85.00   Use ClearFlowhart (secure email communication and access to your chart) to send your primary care provider a message or make an appointment. Ask someone on your Team how to sign up for INVERMARTt.    For a Price Quote for your services, please call our [facility] Price Line at [phone].    As always, Thank you for trusting us with your health care needs!    Discharged by Caitlyn CHISHOLM CMA ([facility])            Follow-ups after your visit        Your next 10 appointments already scheduled     Oct 23, 2017  1:40 PM CDT   New Visit with Ira Pierre MD   [facility] ([facility])    [address] [place] [id number] [phone]              Who to contact     If you have questions or need follow up information about today's clinic visit or your schedule please contact [facility] directly at [phone].  Normal or non-critical lab and imaging results will be communicated to you by MyChart, letter or phone within 4 business days after the clinic has received the results. If you do not hear from us within 7 days, please contact the clinic through MyChart or phone. If you have a critical or abnormal lab result, we will notify you by phone as soon as possible.  Submit refill requests through Weimi or call your pharmacy and they will forward the refill request to us. Please allow 3 business days for your refill to be completed.          Additional Information About Your Visit        ClearFlowhart Information     INVERMARTt gives you secure access to your electronic health record. If you see a primary care provider, you can also send messages to your care team and make appointments. If you have questions, please call your primary care clinic.  " If you do not have a primary care provider, please call [phone] and they will assist you.        Care EveryWhere ID     This is your Care EveryWhere ID. This could be used by other organizations to access your [place] medical records  [id number]        Your Vitals Were     Pulse Temperature Height Last Period Pulse Oximetry BMI (Body Mass Index)    68 97.3  F (36.3  C) (Oral) 5' 5.25\" (1.657 m) 02/01/2010 95% 47.23 kg/m2       Blood Pressure from Last 3 Encounters:   09/20/17 118/80   09/08/17 116/76   09/01/17 100/70    Weight from Last 3 Encounters:   09/20/17 286 lb (129.7 kg)   09/08/17 285 lb (129.3 kg)   09/01/17 283 lb 6.4 oz (128.5 kg)              We Performed the Following     Spirometry, Breathing Capacity     Spirometry, Breathing Capacity     Spirometry, Breathing Capacity          Today's Medication Changes          These changes are accurate as of: 9/20/17  1:01 PM.  If you have any questions, ask your nurse or doctor.               These medicines have changed or have updated prescriptions.        Dose/Directions    ranitidine 150 MG tablet   Commonly known as:  ZANTAC   This may have changed:  See the new instructions.   Used for:  Gastroesophageal reflux disease, esophagitis presence not specified   Changed by:  Vania Monroe APRN CNP        TAKE 1 TABLET (150 MG) BY MOUTH 2 TIMES DAILY   Quantity:  60 tablet   Refills:  5            Where to get your medicines      These medications were sent to [facility]/pharmacy #[id number] - [place], [place] - [address] [address]     Phone:  [phone]     ranitidine 150 MG tablet                Primary Care Provider Office Phone # Fax #    JACKELINE Arenas [phone] [address] [facility] [address]        Equal Access to Services     OSCAR URBANO AH: Hadii aad ku hadasho Soomaali, waaxda luqadaha, qaybta kaalmada adeegyada, chelsie arguello " ah. So [facility] [phone].    ATENCIÓN: Si reji espino, tiene a eason disposición servicios gratuitos de asistencia lingüística. Cathryn al [phone].    We comply with applicable federal civil rights laws and [place] laws. We do not discriminate on the basis of race, color, national origin, age, disability sex, sexual orientation or gender identity.            Thank you!     Thank you for choosing [facility]  for your care. Our goal is always to provide you with excellent care. Hearing back from our patients is one way we can continue to improve our services. Please take a few minutes to complete the written survey that you may receive in the mail after your visit with us. Thank you!             Your Updated Medication List - Protect others around you: Learn how to safely use, store and throw away your medicines at www.disposemymeds.org.          This list is accurate as of: 9/20/17  1:01 PM.  Always use your most recent med list.                   Brand Name Dispense Instructions for use Diagnosis    AMBIEN 10 MG tablet   Generic drug:  zolpidem      1 TABLET DAILY AT BEDTIME        ASPIRIN NOT PRESCRIBED    INTENTIONAL    0 each    Please choose reason not prescribed, below    Paroxysmal atrial fibrillation (H)       blood glucose monitoring lancets     1 Box    TEST ONCE DAILY OR AS NEEDED *DISPENSE WHATEVER IS COVERED BY INS*    Type 2 diabetes mellitus without complication, without long-term current use of insulin (H)       blood glucose monitoring meter device kit    no brand specified    1 kit    Use to test blood sugar one times daily or as directed.    Type 2 diabetes mellitus without complication, without long-term current use of insulin (H)       blood glucose monitoring test strip    no brand specified    100 strip    Use to test blood sugars one times daily or as directed    Type 2 diabetes mellitus without complication, without long-term current use of insulin (H)       BUSPAR PO       Take 30 mg by mouth 2 times daily        BYDUREON 2 MG pen   Generic drug:  exenatide ER      Inject 2 mg Subcutaneous every 7 days        calcium carbonate 1250 (500 CA) MG Tabs tablet    OSCAL 500    270 tablet    TAKE 1 TABLET BY MOUTH. THREE TIMES DAILY.    Elevated PTHrP level (H)       CVS SPECTRAVITE ULTRA WOMEN Tabs     90 tablet    TAKE 1 TABLET BY MOUTH DAILY    Constipation, chronic       cyanocobalamin 1000 MCG Tabs    [facility]S vitamin  B12    90 tablet    TAKE 1 TABLET (1,000 MCG) BY MOUTH DAILY    Vitamin B12 deficiency (non anemic)       dabigatran ANTICOAGULANT 150 MG capsule    PRADAXA    60 capsule    Take 1 capsule (150 mg) by mouth twice daily. Store in original 's bottle or blister pack; use within 120 days of opening.    Paroxysmal atrial fibrillation (H)       docusate sodium 100 MG capsule    COLACE    180 capsule    TAKE 1 CAPSULE (100 MG) BY MOUTH 2 TIMES DAILY    Constipation, unspecified constipation type       escitalopram 20 MG tablet    LEXAPRO    90 tablet    Take 1 tablet (20 mg) by mouth daily    Paroxysmal atrial fibrillation (H)       Ferrous Sulfate 143 (45 FE) MG Tbcr    CVS SLOW RELEASE IRON    180 tablet    Take 1 tablet by mouth 2 times daily    Iron deficiency       furosemide 40 MG tablet    LASIX    90 tablet    TAKE 1 TABLET (40 MG) BY MOUTH DAILY    Pedal edema       insulin pen needle 31G X 5 MM    B-D U/F    100 each    Use once daily or as directed.    Type 2 diabetes mellitus without complication (H)       levothyroxine 75 MCG tablet    SYNTHROID/LEVOTHROID    90 tablet    Take 1 tablet (75 mcg) by mouth daily    Acquired hypothyroidism, Vitamin D deficiency, Hyperparathyroidism (H), Abdominal pain, epigastric       lisinopril 10 MG tablet    PRINIVIL/ZESTRIL    90 tablet    TAKE 1 TABLET (10 MG) BY MOUTH DAILY    Type 2 diabetes mellitus without complication, without long-term current use of insulin (H), Essential hypertension with goal blood pressure less  than 140/90       LORazepam 1 MG tablet    ATIVAN     Take 1 mg by mouth At Bedtime        metoprolol 25 MG 24 hr tablet    TOPROL-XL    45 tablet    Take 0.5 tablets (12.5 mg) by mouth daily    Paroxysmal atrial fibrillation (H)       omeprazole 20 MG CR capsule    priLOSEC    180 capsule    Take 1 capsule (20 mg) by mouth 2 times daily    Hiatal hernia       order for DME     1 Box    Test strips for pt's glucometer, brand as covered by insurance. Test twice daily or PRN.    Type 2 diabetes mellitus without complication (H)       potassium chloride 20 MEQ CR tablet   Generic drug:  potassium chloride SA     180 tablet    TAKE 1 TABLET (20 MEQ) BY MOUTH 2 TIMES DAILY    Diuresis       PROVIGIL 200 MG tablet   Generic drug:  modafinil      Take 0.5 tablets by mouth daily.        ranitidine 150 MG tablet    ZANTAC    60 tablet    TAKE 1 TABLET (150 MG) BY MOUTH 2 TIMES DAILY    Gastroesophageal reflux disease, esophagitis presence not specified       simvastatin 20 MG tablet    ZOCOR    90 tablet    TAKE 1 TABLET (20 MG) BY MOUTH AT BEDTIME NEED LABS    Hyperlipidemia LDL goal <100       vitamin D 87312 UNIT capsule    ERGOCALCIFEROL    8 capsule    TAKE 1 CAPSULE BY MOUTH ONCE WEEKLY.    Vitamin D deficiency, Hyperparathyroidism (H), Acquired hypothyroidism, Abdominal pain, epigastric       Vitamin D3 3000 UNITS Tabs

## 2017-09-20 NOTE — Clinical Note
Is there a way that we can make sure patient is only charged for one spirometry procedure.  The results of the first test are not interpretable due to inadequate .  Thanks, Vania Monroe, CNP

## 2017-09-21 ENCOUNTER — PRE VISIT (OUTPATIENT)
Dept: CARDIOLOGY | Facility: CLINIC | Age: 57
End: 2017-09-21

## 2017-09-28 ENCOUNTER — TRANSFERRED RECORDS (OUTPATIENT)
Dept: HEALTH INFORMATION MANAGEMENT | Facility: CLINIC | Age: 57
End: 2017-09-28

## 2017-10-04 ENCOUNTER — OFFICE VISIT (OUTPATIENT)
Dept: CARDIOLOGY | Facility: CLINIC | Age: 57
End: 2017-10-04
Payer: COMMERCIAL

## 2017-10-04 VITALS — SYSTOLIC BLOOD PRESSURE: 127 MMHG | DIASTOLIC BLOOD PRESSURE: 84 MMHG | HEART RATE: 70 BPM | OXYGEN SATURATION: 97 %

## 2017-10-04 DIAGNOSIS — R07.9 CHEST PAIN, UNSPECIFIED TYPE: Primary | ICD-10-CM

## 2017-10-04 DIAGNOSIS — I49.1 PREMATURE ATRIAL COMPLEXES: ICD-10-CM

## 2017-10-04 DIAGNOSIS — I48.0 PAROXYSMAL ATRIAL FIBRILLATION (H): ICD-10-CM

## 2017-10-04 PROCEDURE — 93000 ELECTROCARDIOGRAM COMPLETE: CPT | Performed by: INTERNAL MEDICINE

## 2017-10-04 PROCEDURE — 99205 OFFICE O/P NEW HI 60 MIN: CPT | Performed by: INTERNAL MEDICINE

## 2017-10-04 RX ORDER — METOPROLOL SUCCINATE 100 MG/1
100 TABLET, EXTENDED RELEASE ORAL DAILY
Qty: 90 TABLET | Refills: 3 | Status: SHIPPED | OUTPATIENT
Start: 2017-10-04

## 2017-10-04 ASSESSMENT — PAIN SCALES - GENERAL: PAINLEVEL: SEVERE PAIN (6)

## 2017-10-04 NOTE — PATIENT INSTRUCTIONS
1.  Dr. Marium Mi has increased your Metoprolol to 100 mg everyday.  This prescription has been sent to your preferred pharmacy.    2. Dr. Marium Mi has requested you to have a Cardiac CT Angiogram.  This has been scheduled at the [facility] on Thursday, October 5TH at 9:00 AM; please arrive to the [facility] AREA 1 HOUR PRIOR (8:00 AM).     Please follow these INSTRUCTIONS for PREP of your CT SCAN:  1.  PLEASE DO NOT EAT OR DRINK 3 HOURS PRIOR TO PROCEDURE  2.  PLEASE DO NOT USE ALCOHOL, CAFFEINE OR TOBACCO 12 HOURS PRIOR TO THE PROCEDURE    3.  1 month follow up with Dr. Mi is scheduled for Tuesday, November 14th at 9:30 am        [facility] Cardiology - [place] Location    If you have any questions regarding to your visit please contact your care team:     Cardiology  Telephone Number   Jeannette Logan  Cardiology RN's.    Mavis Kaiser CMA [phone]    After hours: [phone].  [phone]   For scheduling appts:     [phone] or  [phone]    After hours: [phone]   For the Device Clinic (Pacemakers and ICD's)  RN's :  Heena Strange   During business hours: [phone]  After business hours:  [phone]- select option 4.      If you need a medication refill please contact your pharmacy.  Please allow 3 business days for your refill to be completed.    As always, Thank you for trusting us with your health care needs!  _____________________________________________________________________

## 2017-10-04 NOTE — MR AVS SNAPSHOT
After Visit Summary   10/4/2017    Gladys Singh    MRN: [id number]           Patient Information     Date Of Birth          1960        Visit Information        Provider Department      10/4/2017 9:30 AM Marium Mi MD [facility] PHYSICIANS HEART AT [facility]        Today's Diagnoses     Chest pain, unspecified type    -  1    Paroxysmal atrial fibrillation (H)          Care Instructions    1.  Dr. Marium Mi has increased your Metoprolol to 100 mg everyday.  This prescription has been sent to your preferred pharmacy.    2. Dr. Marium Mi has requested you to have a Cardiac CT Angiogram.  This has been scheduled at the [facility] on Thursday, October 5TH at 9:00 AM; please arrive to the [facility] WAITING AREA 1 HOUR PRIOR (8:00 AM).     Please follow these INSTRUCTIONS for PREP of your CT SCAN:  1.  PLEASE DO NOT EAT OR DRINK 3 HOURS PRIOR TO PROCEDURE  2.  PLEASE DO NOT USE ALCOHOL, CAFFEINE OR TOBACCO 12 HOURS PRIOR TO THE PROCEDURE    3.  1 month follow up with Dr. Mi is scheduled for Tuesday, November 14th at 9:30 am        [facility] Cardiology [facility] Location    If you have any questions regarding to your visit please contact your care team:     Cardiology  Telephone Number   Jeannette Logan  Cardiology RN's.    Mavis Kaiser CMA [phone]    After hours: [phone].  [phone]   For scheduling appts:     [phone] or  [phone]    After hours: [phone]   For the Device Clinic (Pacemakers and ICD's)  RN's :  Heena Strange   During business hours: [phone]  After business hours:  [phone]- select option 4.      If you need a medication refill please contact your pharmacy.  Please allow 3 business days for your refill to be completed.    As always, Thank you for trusting us with your health care needs!  _____________________________________________________________________               Follow-ups after your visit        Your next 10 appointments already scheduled     Oct 05, 2017  8:00 AM CDT   CTA ANGIOGRAM CORONARY ARTERY with UUCT4, UU CV CT NURSE   [facility], [place], [place] ([facility])    [address] [address] [phone]           Please allow two hours for this test.  Follow the instructions below:   The day before your exam, drink extra fluids at least six 8-ounce glasses (unless your doctor wants you to restrict your fluids).   No caffeine and no smoking the day of the test.   Do not eat or drink for 3 hours before your exam. You may take your morning medicines with small sips of water.   You may have or need a blood test (creatinine test) within 30 days of your exam. Go to your clinic or Diagnostic Imaging Department for this test.   If you take Viagra, Levitra or Cialis, stop taking it for 48 hours before your test.   If you are diabetic and take oral hypoglycemics, do not take them on the day of your test. Also, wait 48 hours before re-starting metformin (Avandamet, Glucophage, Glucovance, Metaglip).   Do not take diuretics on day of the test. This includes Furosemide (Lasix), Torsemide, Bumetanide (Bumex), Metolazone (Zaroxolyn) and Hydrochlorothiazide.   Do not take NSAIDS on the day of the test. This includes ibuprofen (Advil or Motrin), Naproxen and Indomethacin.  Bring any scans or X-rays taken at other hospitals, if similar tests were done. Also bring a list of your medicines, including vitamins, minerals and over-the-counter drugs. It is safest to leave personal items at home.  Be sure to tell your doctor:   If you have any allergies, including any reaction to contrast.   If there s any chance you are pregnant.   If you are breastfeeding.   If you have any special needs.  Please wear loose clothing, such as a sweat suit or jogging clothes. Avoid snaps, zippers and other metal. We may ask you to undress and put on  a hospital gown.  If you have any questions, please call the Imaging Department where you will have your exam.            Oct 23, 2017  1:40 PM CDT   New Visit with Ira Pierre MD   [facility] PHYSICIANS HEART AT [facility] ([facility])    [address] [place] [id number]   [phone]            Nov 14, 2017  9:30 AM CST   Return Visit with Marium Mi MD   [facility] HEART AT [facility] ([facility])    [address] [place] [id number]   [phone]              Future tests that were ordered for you today     Open Future Orders        Priority Expected Expires Ordered    CT Angiogram coronary artery Routine 10/5/2017 10/4/2018 10/4/2017            Who to contact     If you have questions or need follow up information about today's clinic visit or your schedule please contact [facility] PHYSICIANS HEART AT [facility] directly at [phone].  Normal or non-critical lab and imaging results will be communicated to you by FreeBordershart, letter or phone within 4 business days after the clinic has received the results. If you do not hear from us within 7 days, please contact the clinic through RetailTowert or phone. If you have a critical or abnormal lab result, we will notify you by phone as soon as possible.  Submit refill requests through [facility] or call your pharmacy and they will forward the refill request to us. Please allow 3 business days for your refill to be completed.          Additional Information About Your Visit        FamilyLink Information     [facility]Link gives you secure access to your electronic health record. If you see a primary care provider, you can also send messages to your care team and make appointments. If you have questions, please call your primary care clinic.  If you do not have a primary care provider, please call [phone] and they will assist you.        Care EveryWhere ID      This is your Care EveryWhere ID. This could be used by other organizations to access your [facility] medical records  [id number]        Your Vitals Were     Pulse Last Period Pulse Oximetry             70 02/01/2010 97%          Blood Pressure from Last 3 Encounters:   10/04/17 127/84   09/20/17 118/80   09/08/17 116/76    Weight from Last 3 Encounters:   09/20/17 129.7 kg (286 lb)   09/08/17 129.3 kg (285 lb)   09/01/17 128.5 kg (283 lb 6.4 oz)              We Performed the Following     EKG 12-lead complete w/read - Clinics          Today's Medication Changes          These changes are accurate as of: 10/4/17 10:52 AM.  If you have any questions, ask your nurse or doctor.               These medicines have changed or have updated prescriptions.        Dose/Directions    * metoprolol 25 MG 24 hr tablet   Commonly known as:  TOPROL-XL   This may have changed:  Another medication with the same name was added. Make sure you understand how and when to take each.   Used for:  Paroxysmal atrial fibrillation (H)   Changed by:  Vania Monroe APRN CNP        Dose:  12.5 mg   Take 0.5 tablets (12.5 mg) by mouth daily   Quantity:  45 tablet   Refills:  1       * metoprolol 100 MG 24 hr tablet   Commonly known as:  TOPROL-XL   This may have changed:  You were already taking a medication with the same name, and this prescription was added. Make sure you understand how and when to take each.   Used for:  Paroxysmal atrial fibrillation (H), Chest pain, unspecified type   Changed by:  Marium Mi MD        Dose:  100 mg   Take 1 tablet (100 mg) by mouth daily   Quantity:  90 tablet   Refills:  3       * Notice:  This list has 2 medication(s) that are the same as other medications prescribed for you. Read the directions carefully, and ask your doctor or other care provider to review them with you.         Where to get your medicines      These medications were sent to [facility]/pharmacy #[id number] - [place], [place] - [address] [address]     Phone:  [phone]     metoprolol 100 MG 24 hr tablet                Primary Care Provider Office Phone # Fax #    JACKELINE Arenas NAIMA [phone]       [address] [address]        Equal Access to Services     [facility]: Hadii aad ku hadasho Soomaali, waaxda luqadaha, qaybta kaalmada adeegyada, waxay idiin hayaan adeeg kharash la'aan . So [facility] [phone].    ATENCIÓN: Si habla español, tiene a eason disposición servicios gratuitos de asistencia lingüística. Cathryn al [phone].    We comply with applicable federal civil rights laws and [place] laws. We do not discriminate on the basis of race, color, national origin, age, disability, sex, sexual orientation, or gender identity.            Thank you!     Thank you for choosing [facility] PHYSICIANS HEART AT [facility]  for your care. Our goal is always to provide you with excellent care. Hearing back from our patients is one way we can continue to improve our services. Please take a few minutes to complete the written survey that you may receive in the mail after your visit with us. Thank you!             Your Updated Medication List - Protect others around you: Learn how to safely use, store and throw away your medicines at www.disposemymeds.org.          This list is accurate as of: 10/4/17 10:52 AM.  Always use your most recent med list.                   Brand Name Dispense Instructions for use Diagnosis    AMBIEN 10 MG tablet   Generic drug:  zolpidem      1 TABLET DAILY AT BEDTIME        ASPIRIN NOT PRESCRIBED    INTENTIONAL    0 each    Please choose reason not prescribed, below    Paroxysmal atrial fibrillation (H)       blood glucose monitoring lancets     1 Box    TEST ONCE DAILY OR AS NEEDED *DISPENSE WHATEVER IS COVERED BY INS*    Type 2 diabetes mellitus without complication, without long-term current use of insulin  (H)       blood glucose monitoring meter device kit    no brand specified    1 kit    Use to test blood sugar one times daily or as directed.    Type 2 diabetes mellitus without complication, without long-term current use of insulin (H)       blood glucose monitoring test strip    no brand specified    100 strip    Use to test blood sugars one times daily or as directed    Type 2 diabetes mellitus without complication, without long-term current use of insulin (H)       BUSPAR PO      Take 30 mg by mouth 2 times daily        BYDUREON 2 MG pen   Generic drug:  exenatide ER      Inject 2 mg Subcutaneous every 7 days        calcium carbonate 1250 (500 CA) MG Tabs tablet    OSCAL 500    270 tablet    TAKE 1 TABLET BY MOUTH. THREE TIMES DAILY.    Elevated PTHrP level (H)       CVS SPECTRAVITE ULTRA WOMEN Tabs     90 tablet    TAKE 1 TABLET BY MOUTH DAILY    Constipation, chronic       cyanocobalamin 1000 MCG Tabs    CVS vitamin  B12    90 tablet    TAKE 1 TABLET (1,000 MCG) BY MOUTH DAILY    Vitamin B12 deficiency (non anemic)       dabigatran ANTICOAGULANT 150 MG capsule    PRADAXA    60 capsule    Take 1 capsule (150 mg) by mouth twice daily. Store in original 's bottle or blister pack; use within 120 days of opening.    Paroxysmal atrial fibrillation (H)       docusate sodium 100 MG capsule    COLACE    180 capsule    TAKE 1 CAPSULE (100 MG) BY MOUTH 2 TIMES DAILY    Constipation, unspecified constipation type       escitalopram 20 MG tablet    LEXAPRO    90 tablet    Take 1 tablet (20 mg) by mouth daily    Paroxysmal atrial fibrillation (H)       Ferrous Sulfate 143 (45 FE) MG Tbcr    CVS SLOW RELEASE IRON    180 tablet    Take 1 tablet by mouth 2 times daily    Iron deficiency       furosemide 40 MG tablet    LASIX    90 tablet    TAKE 1 TABLET (40 MG) BY MOUTH DAILY    Pedal edema       insulin pen needle 31G X 5 MM    B-D U/F    100 each    Use once daily or as directed.    Type 2 diabetes mellitus  without complication (H)       KLOR-CON 20 MEQ CR tablet   Generic drug:  potassium chloride SA     180 tablet    TAKE 1 TABLET (20 MEQ) BY MOUTH 2 TIMES DAILY    Diuresis       levothyroxine 75 MCG tablet    SYNTHROID/LEVOTHROID    60 tablet    TAKE 1 TABLET (75 MG) BY MOUTH EVERY DAY    Acquired hypothyroidism, Vitamin D deficiency, Hyperparathyroidism (H), Abdominal pain, epigastric       lisinopril 10 MG tablet    PRINIVIL/ZESTRIL    90 tablet    TAKE 1 TABLET (10 MG) BY MOUTH DAILY    Type 2 diabetes mellitus without complication, without long-term current use of insulin (H), Essential hypertension with goal blood pressure less than 140/90       LORazepam 1 MG tablet    ATIVAN     Take 1 mg by mouth At Bedtime        * metoprolol 25 MG 24 hr tablet    TOPROL-XL    45 tablet    Take 0.5 tablets (12.5 mg) by mouth daily    Paroxysmal atrial fibrillation (H)       * metoprolol 100 MG 24 hr tablet    TOPROL-XL    90 tablet    Take 1 tablet (100 mg) by mouth daily    Paroxysmal atrial fibrillation (H), Chest pain, unspecified type       omeprazole 20 MG CR capsule    priLOSEC    180 capsule    Take 1 capsule (20 mg) by mouth 2 times daily    Hiatal hernia       order for DME     1 Box    Test strips for pt's glucometer, brand as covered by insurance. Test twice daily or PRN.    Type 2 diabetes mellitus without complication (H)       PROVIGIL 200 MG tablet   Generic drug:  modafinil      Take 0.5 tablets by mouth daily.        ranitidine 150 MG tablet    ZANTAC    60 tablet    TAKE 1 TABLET (150 MG) BY MOUTH 2 TIMES DAILY    Gastroesophageal reflux disease, esophagitis presence not specified       simvastatin 20 MG tablet    ZOCOR    90 tablet    TAKE 1 TABLET (20 MG) BY MOUTH AT BEDTIME NEED LABS    Hyperlipidemia LDL goal <100       vitamin D 61476 UNIT capsule    ERGOCALCIFEROL    8 capsule    TAKE 1 CAPSULE BY MOUTH ONCE WEEKLY.    Vitamin D deficiency, Hyperparathyroidism (H), Acquired hypothyroidism, Abdominal  pain, epigastric       Vitamin D3 3000 UNITS Tabs           * Notice:  This list has 2 medication(s) that are the same as other medications prescribed for you. Read the directions carefully, and ask your doctor or other care provider to review them with you.

## 2017-10-04 NOTE — NURSING NOTE
"Chief Complaint   Patient presents with     Chest Pain     atypical chest pain, a-fib, SVT, normal echo and stress test. Pt reports daily chest pain that is worse in the afternoon. States the pain will feel like a squeezing and radiate to her back. Will get SOB, sweating, fluttering with the episodes. States it very scary to her. States she gets dizzy when standing up, and is a lot more tired. Does have family hx of heart issues.       Initial /84 (BP Location: Right arm, Patient Position: Chair, Cuff Size: Adult Large)  Pulse 70  LMP 02/01/2010  SpO2 97% Estimated body mass index is 47.23 kg/(m^2) as calculated from the following:    Height as of 9/20/17: 5' 5.25\" (1.657 m).    Weight as of 9/20/17: 286 lb (129.7 kg)..  BP completed using cuff size: large    Bita Kaiser CMA    "

## 2017-10-04 NOTE — PROGRESS NOTES
Chief complaint: Palpitations and chest pain    HPI: Ms. Gladys Singh is a 57 year old  female who presents today with palpitations for the last 2 months almost everyday at least 5-6x a day lasting for 2 minutes. She sometimes feels chest pressure and shortness of breath along with her palpitations which scares her a lot. She feels worse in the afternoon and she feels the episodes on and off till she goes to sleep. She feels the same way everyday. She does feel dizzy occasionally during palpitations but never passed out. She doesnot think exertion or physical activity makes it worse. She also noticed than she swetas more than usual.     She also noticed chest pain or chest pressure for the last 6 months unrelated to physical activities. Sometimes with palpitations. It usually lasts for 1-2 minutes. She does feel SOB worse on exertion for the last few months. SOB is releived by resting. Today she denies  PND, ortopnea, syncope or LE edema.      She was seen by her PCP Vania Monroe and she ordered Zio Patch and Lexiscan Strees test. Zio-Patch showed Paroxysmal atrial fibrillatin. She was started on pradaxa 150 mg bid and metoprolol 12.5 mg qday. Her CHADSVaSc score is 3. No hx of stroke.     She has a PMH of DM2, HT, HL, former smoking, KHUSHI on CPAP, and hypothyroidism. No FH of CAD or SCD.     I have reviewed his echocardiogram from yesterday. It shows a structurally normal heart. I have also reviewed her ECG in clinic today which shows normal sinus rhythm.     Medications, personal, family, and social history reviewed with patient and revised.    PAST MEDICAL HISTORY:  DM2  KHUSHI on CPAP  Morbid obesity  Paroxysmal atrial fibrillation  HT  HL  Hypothyroidism  Hyperparathyroidism   Chron`s disease  Anxiety disorder    CURRENT MEDICATIONS:  Pertinent cardiac meds    Metoprolol 12.5 mg qd  Simvastatin 20 mg qd  Paradaxa 150 mg bid  Lisinopril 10 mg  Levothyroxine 75 mg qd  Furosemide 40 mg  qd  Insulin      PAST SURGICAL HISTORY:  Past Surgical History:   Procedure Laterality Date     APPENDECTOMY       ARTHROSCOPY KNEE RT/LT       C  DELIVERY ONLY      x 2     C TOTAL KNEE ARTHROPLASTY  2004    bilateral     COLONOSCOPY  , ,     early Crohn's     HC COLP CERVIX/UPPER VAGINA W BX CERVIX  ,     neg     HC ESOPHAGOSCOPY, DIAGNOSTIC  , ,     benign, fungal infection     HC REPAIR OF NASAL SEPTUM       HERNIA REPAIR, UMBILICAL       PE TUBES       TONSILLECTOMY & ADENOIDECTOMY       total knee Bilateral 2004    BRAD Mikael Prasad MD       ALLERGIES:     Allergies   Allergen Reactions     Augmentin GI Disturbance     Victoza Other (See Comments)     Abdominal pain       FAMILY HISTORY:  Family History   Problem Relation Age of Onset     DIABETES Mother      CANCER Mother      endometrial     Arrhythmia Mother      DIABETES Father      Prostate Cancer Father      C.A.D. Father      CABGx3, Valve replacement, stents     DIABETES Maternal Grandmother      CEREBROVASCULAR DISEASE Paternal Grandfather      Cancer - colorectal Sister      Neurologic Disorder Daughter      anxiety     Neurologic Disorder Daughter      anxiety, bipolar         SOCIAL HISTORY:  Social History   Substance Use Topics     Smoking status: Former Smoker     Quit date: 1994     Smokeless tobacco: Never Used     Alcohol use No       ROS:   Constitutional: No fever, chills, or sweats. Weight stable.   ENT: No visual disturbance, ear ache, epistaxis, sore throat.   Cardiovascular: As per HPI.   Respiratory: No cough, hemoptysis.    GI: No nausea, vomiting, hematemesis, melena, or hematochezia.   : No hematuria.   Integument: Negative.   Psychiatric: Negative.   Hematologic:  Easy bruising, no easy bleeding.  Neuro: Negative.   Endocrinology: No significant heat or cold intolerance   Musculoskeletal: No myalgia.    Exam:  /84 (BP Location: Right arm, Patient Position: Chair, Cuff Size: Adult  Large)  Pulse 70  LMP 02/01/2010  SpO2 97%  GENERAL APPEARANCE: healthy, alert and no distress  HEENT: no icterus, no central cyanosis  LYMPH/NECK: no adenopathy, no asymmetry, JVP not elevated, no carotid bruits.  RESPIRATORY: lungs clear to auscultation - no rales, rhonchi or wheezes, no use of accessory muscles, no retractions, respirations are unlabored, normal respiratory rate  CARDIOVASCULAR: regular rhythm,  frequent skipped beats, normal S1, S2, no S3 or S4 and no murmur, click or rub, precordium quiet with normal PMI.  GI: soft, non tender  EXTREMITIES: peripheral pulses normal, no edema  NEURO: alert and oriented to person/place/time, normal speech,and affect  VASC: Radial pulse is normal in volumes and symmetric bilaterally. Cannot feel dorsalis pedis and posterior tibialis. Extremity well perfused and warm.  SKIN: no ecchymoses, no rashes     I have reviewed the labs and personally reviewed the imaging below and made my comment in the assessment and plan.    Labs:  CBC RESULTS:   Lab Results   Component Value Date    WBC 5.6 08/18/2017    RBC 4.74 08/18/2017    HGB 14.8 08/18/2017    HCT 43.9 08/18/2017    MCV 93 08/18/2017    MCH 31.2 08/18/2017    MCHC 33.7 08/18/2017    RDW 13.4 08/18/2017     08/18/2017       BMP RESULTS:  Lab Results   Component Value Date     08/18/2017    POTASSIUM 4.4 08/18/2017    CHLORIDE 108 08/18/2017    CO2 33 (H) 08/18/2017    ANIONGAP 1 (L) 08/18/2017    GLC 94 08/18/2017    BUN 6 (L) 08/18/2017    CR 0.90 08/18/2017    GFRESTIMATED 64 08/18/2017    GFRESTBLACK 78 08/18/2017    CASSANDRA 9.6 08/18/2017        INR RESULTS:  Lab Results   Component Value Date    INR 0.93 09/01/2017       Procedures:  Echocardiogram 9/12/2017    Global and regional left ventricular function is normal with an EF of 60-65%.  Global right ventricular function is normal.  Pulmonary artery systolic pressure cannot be assessed.  Estimated mean right atrial pressure is 8 mmHg.  No  pericardial effusion is present.  Compared to previous study dated 3/6/2013, there has been no significant change.    Lexiscan 8/30/2017    1. Normal myocardial SPECT study within the limitations of poor image  quality. The summed stress score is  0. A summed stress score of <4 is  associated with an annual event rate of 0.8% and 0.9% for myocardial  infarction and cardiac death, respectively (Gurmeet. Circulation  1998;98:535-43).  2. Normal left ventricular systolic function with a left ventricular  ejection fraction of  71%.   3. No significant perfusion abnormalities.   4. No prior study available for comparison.       CT Chest 9/8/2017     No acute chest abnormality. No evidence of pulmonary  embolism. Fatty infiltration of the liver noted.    EKG dated 8/15/2017 Normal sinus rhythm  V1-V4 T wave inversion, PACs.    EKG in clinic today: HR 60 bpm, SR, no PACs.    Zio-Patch 8/18/2017: Paroxysmal atrial fibrillation and flutter, burden <1%. Longest episode 2 min. Symptoms correspond to runs of PACs      Assessment and Plan:     Mrs. Singh is a 57 year old lady who has recently been diagnosed with Parosxymal afib with pmh of DM2, HT, HL, former smoking, KHUSHI on CPAP.    1. Paroxysmal atrial fibrillation: Cades <1% on Zio Patch. Longest episode 2 min. HR  bpm. Her CHADSVaSc score is 3. She is on Pradaxa 150 mg bid. She is on 12,5 mg of metoprolol. I have increased the dose to 100 mg once a day.    2. Chest pain and shortness of breath: Her recent Lexiscan is poor image quality, though no perfusion abnormalities. I have ordered a CTA to rule out CAD because if she is still symptomatic from her afib episodes, I will think of putting her on flecainide and need to absolutely rule out CAD.    3. SOB: is not recent. Can be due to her KHUSHI, morbid obesity as well. Her echocardiogram also shows normal LV and RV function. Spirometry test is normal. Her recent chest CT did not show PE. CTA is ordered to rule  CAD.    4. Medication plan:  -Increase dose of Metoprolol to 100 mg qd  -Simvastatin 20 mg qd  -Paradaxa 150 mg bid  -Lisinopril 10 mg  -Levothyroxine 75 mg qd  -Furosemide 40 mg qd    Test ordered: CTA  Medication change: Metroprolol 100 mg qd    It was a 60 min face-to-face clinic visit answering questions, coordinating care and evaluating tests that has been done.    It was my absolute pleasure to meet Mrs. Singh in the office today. Please donot hesitate to contact me if you have any questions or concerns.    Progress note 10/5/2017    CTA is cancelled today due to very frequent PACs. I will reevaluate her in one month time. If her PACs resolve, I will reorder CTA otherwise will plan MRI Stress.    Marium SONG MD  [facility] Division of Cardiology  Pager [phone]

## 2017-10-04 NOTE — NURSING NOTE
Cardiac Testing: Patient given instructions regarding  CTA. Discussed purpose, preparation, procedure and when to expect results reported back to the patient. Patient demonstrated understanding of this information and agreed to call with further questions or concerns.  Med Reconcile: Reviewed and verified all current medications with the patient. The updated medication list was printed and given to the patient.  Return Appointment: Patient given instructions regarding scheduling next clinic visit. Patient demonstrated understanding of this information and agreed to call with further questions or concerns.  1 mo  Medication Change: Patient was educated regarding prescribed medication change, including discussion of the indication, administration, side effects, and when to report to MD or RN. Patient demonstrated understanding of this information and agreed to call with further questions or concerns.  Increase Toprol 100 mg daily  Patient stated she understood all health information given and agreed to call with further questions or concerns.  Marnie Lackey RN

## 2017-10-04 NOTE — LETTER
10/4/2017      RE: Gladys Singh  [address]  [place] [id number]       Dear Colleague,    Thank you for the opportunity to participate in the care of your patient, Gladys Singh, at the [facility] HEART AT [facility] at [facility]. Please see a copy of my visit note below.    Chief complaint: Palpitations and chest pain    HPI: Ms. Gladys Singh is a 57 year old  female who presents today with palpitations for the last 2 months almost everyday at least 5-6x a day lasting for 2 minutes. She sometimes feels chest pressure and shortness of breath along with her palpitations which scares her a lot. She feels worse in the afternoon and she feels the episodes on and off till she goes to sleep. She feels the same way everyday. She does feel dizzy occasionally during palpitations but never passed out. She doesnot think exertion or physical activity makes it worse. She also noticed than she swetas more than usual.     She also noticed chest pain or chest pressure for the last 6 months unrelated to physical activities. Sometimes with palpitations. It usually lasts for 1-2 minutes. She does feel SOB worse on exertion for the last few months. SOB is releived by resting. Today she denies  PND, ortopnea, syncope or LE edema.      She was seen by her PCP Vania Monroe and she ordered Zio Patch and Lexiscan Strees test. Zio-Patch showed Paroxysmal atrial fibrillatin. She was started on pradaxa 150 mg bid and metoprolol 12.5 mg qday. Her CHADSVaSc score is 3. No hx of stroke.     She has a PMH of DM2, HT, HL, former smoking, KHUSHI on CPAP, and hypothyroidism. No FH of CAD or SCD.     I have reviewed his echocardiogram from yesterday. It shows a structurally normal heart. I have also reviewed her ECG in clinic today which shows normal sinus rhythm.     Medications, personal, family, and social history reviewed with patient  and revised.    PAST MEDICAL HISTORY:  DM2  KHUSHI on CPAP  Morbid obesity  Paroxysmal atrial fibrillation  HT  HL  Hypothyroidism  Hyperparathyroidism   Chron`s disease  Anxiety disorder    CURRENT MEDICATIONS:  Pertinent cardiac meds    Metoprolol 12.5 mg qd  Simvastatin 20 mg qd  Paradaxa 150 mg bid  Lisinopril 10 mg  Levothyroxine 75 mg qd  Furosemide 40 mg qd  Insulin      PAST SURGICAL HISTORY:  Past Surgical History:   Procedure Laterality Date     APPENDECTOMY       ARTHROSCOPY KNEE RT/LT       C  DELIVERY ONLY      x 2     C TOTAL KNEE ARTHROPLASTY  2004    bilateral     COLONOSCOPY  , ,     early Crohn's     HC COLP CERVIX/UPPER VAGINA W BX CERVIX  ,     neg     HC ESOPHAGOSCOPY, DIAGNOSTIC  , ,     benign, fungal infection     HC REPAIR OF NASAL SEPTUM       HERNIA REPAIR, UMBILICAL       PE TUBES       TONSILLECTOMY & ADENOIDECTOMY       total knee Bilateral 2004    BRAD Mikael Prasad MD       ALLERGIES:     Allergies   Allergen Reactions     Augmentin GI Disturbance     Victoza Other (See Comments)     Abdominal pain       FAMILY HISTORY:  Family History   Problem Relation Age of Onset     DIABETES Mother      CANCER Mother      endometrial     Arrhythmia Mother      DIABETES Father      Prostate Cancer Father      C.A.D. Father      CABGx3, Valve replacement, stents     DIABETES Maternal Grandmother      CEREBROVASCULAR DISEASE Paternal Grandfather      Cancer - colorectal Sister      Neurologic Disorder Daughter      anxiety     Neurologic Disorder Daughter      anxiety, bipolar         SOCIAL HISTORY:  Social History   Substance Use Topics     Smoking status: Former Smoker     Quit date: 1994     Smokeless tobacco: Never Used     Alcohol use No       ROS:   Constitutional: No fever, chills, or sweats. Weight stable.   ENT: No visual disturbance, ear ache, epistaxis, sore throat.   Cardiovascular: As per HPI.   Respiratory: No cough, hemoptysis.    GI: No nausea,  vomiting, hematemesis, melena, or hematochezia.   : No hematuria.   Integument: Negative.   Psychiatric: Negative.   Hematologic:  Easy bruising, no easy bleeding.  Neuro: Negative.   Endocrinology: No significant heat or cold intolerance   Musculoskeletal: No myalgia.    Exam:  /84 (BP Location: Right arm, Patient Position: Chair, Cuff Size: Adult Large)  Pulse 70  LMP 02/01/2010  SpO2 97%  GENERAL APPEARANCE: healthy, alert and no distress  HEENT: no icterus, no central cyanosis  LYMPH/NECK: no adenopathy, no asymmetry, JVP not elevated, no carotid bruits.  RESPIRATORY: lungs clear to auscultation - no rales, rhonchi or wheezes, no use of accessory muscles, no retractions, respirations are unlabored, normal respiratory rate  CARDIOVASCULAR: regular rhythm,  frequent skipped beats, normal S1, S2, no S3 or S4 and no murmur, click or rub, precordium quiet with normal PMI.  GI: soft, non tender  EXTREMITIES: peripheral pulses normal, no edema  NEURO: alert and oriented to person/place/time, normal speech,and affect  VASC: Radial pulse is normal in volumes and symmetric bilaterally. Cannot feel dorsalis pedis and posterior tibialis. Extremity well perfused and warm.  SKIN: no ecchymoses, no rashes     I have reviewed the labs and personally reviewed the imaging below and made my comment in the assessment and plan.    Labs:  CBC RESULTS:   Lab Results   Component Value Date    WBC 5.6 08/18/2017    RBC 4.74 08/18/2017    HGB 14.8 08/18/2017    HCT 43.9 08/18/2017    MCV 93 08/18/2017    MCH 31.2 08/18/2017    MCHC 33.7 08/18/2017    RDW 13.4 08/18/2017     08/18/2017       BMP RESULTS:  Lab Results   Component Value Date     08/18/2017    POTASSIUM 4.4 08/18/2017    CHLORIDE 108 08/18/2017    CO2 33 (H) 08/18/2017    ANIONGAP 1 (L) 08/18/2017    GLC 94 08/18/2017    BUN 6 (L) 08/18/2017    CR 0.90 08/18/2017    GFRESTIMATED 64 08/18/2017    GFRESTBLACK 78 08/18/2017    CASSANDRA 9.6 08/18/2017         INR RESULTS:  Lab Results   Component Value Date    INR 0.93 09/01/2017       Procedures:  Echocardiogram 9/12/2017    Global and regional left ventricular function is normal with an EF of 60-65%.  Global right ventricular function is normal.  Pulmonary artery systolic pressure cannot be assessed.  Estimated mean right atrial pressure is 8 mmHg.  No pericardial effusion is present.  Compared to previous study dated 3/6/2013, there has been no significant change.    Lexiscan 8/30/2017    1. Normal myocardial SPECT study within the limitations of poor image  quality. The summed stress score is  0. A summed stress score of <4 is  associated with an annual event rate of 0.8% and 0.9% for myocardial  infarction and cardiac death, respectively (Gurmeet. Circulation  1998;98:535-43).  2. Normal left ventricular systolic function with a left ventricular  ejection fraction of  71%.   3. No significant perfusion abnormalities.   4. No prior study available for comparison.       CT Chest 9/8/2017     No acute chest abnormality. No evidence of pulmonary  embolism. Fatty infiltration of the liver noted.    EKG dated 8/15/2017 Normal sinus rhythm  V1-V4 T wave inversion, PACs.    EKG in clinic today: HR 60 bpm, SR, no PACs.    Zio-Patch 8/18/2017: Paroxysmal atrial fibrillation and flutter, burden <1%. Longest episode 2 min. Symptoms correspond to runs of PACs      Assessment and Plan:     Mrs. Singh is a 57 year old lady who has recently been diagnosed with Parosxymal afib with pmh of DM2, HT, HL, former smoking, KHUSHI on CPAP.    1. Paroxysmal atrial fibrillation: [place] <1% on Zio Patch. Longest episode 2 min. HR  bpm. Her CHADSVaSc score is 3. She is on Pradaxa 150 mg bid. She is on 12,5 mg of metoprolol. I have increased the dose to 100 mg once a day.    2. Chest pain and shortness of breath: Her recent Lexiscan is poor image quality, though no perfusion abnormalities. I have ordered a CTA to rule out CAD  because if she is still symptomatic from her afib episodes, I will think of putting her on flecainide and need to absolutely rule out CAD.    3. SOB: is not recent. Can be due to her KHUSHI, morbid obesity as well. Her echocardiogram also shows normal LV and RV function. Spirometry test is normal. Her recent chest CT did not show PE. CTA is ordered to rule CAD.    4. Medication plan:  -Increase dose of Metoprolol to 100 mg qd  -Simvastatin 20 mg qd  -Paradaxa 150 mg bid  -Lisinopril 10 mg  -Levothyroxine 75 mg qd  -Furosemide 40 mg qd    Test ordered: CTA  Medication change: Metroprolol 100 mg qd    It was a 60 min face-to-face clinic visit answering questions, coordinating care and evaluating tests that has been done.    It was my absolute pleasure to meet Mrs. Singh in the office today. Please donot hesitate to contact me if you have any questions or concerns.    Progress note 10/5/2017    CTA is cancelled today due to very frequent PACs. I will reevaluate her in one month time. If her PACs resolve, I will reorder CTA otherwise will plan MRI Stress.    Marium SONG MD  [facility] Division of Cardiology  Pager [phone]

## 2017-10-05 DIAGNOSIS — I10 ESSENTIAL HYPERTENSION WITH GOAL BLOOD PRESSURE LESS THAN 140/90: ICD-10-CM

## 2017-10-05 DIAGNOSIS — E11.9 TYPE 2 DIABETES MELLITUS WITHOUT COMPLICATION, WITHOUT LONG-TERM CURRENT USE OF INSULIN (H): ICD-10-CM

## 2017-10-05 DIAGNOSIS — K59.00 CONSTIPATION, UNSPECIFIED CONSTIPATION TYPE: ICD-10-CM

## 2017-10-06 RX ORDER — DOCUSATE SODIUM 100 MG/1
CAPSULE, LIQUID FILLED ORAL
Qty: 180 CAPSULE | Refills: 2 | Status: SHIPPED | OUTPATIENT
Start: 2017-10-06 | End: 2018-08-05

## 2017-10-06 RX ORDER — LISINOPRIL 10 MG/1
TABLET ORAL
Qty: 90 TABLET | Refills: 2 | Status: SHIPPED | OUTPATIENT
Start: 2017-10-06 | End: 2018-06-09

## 2017-10-23 ENCOUNTER — OFFICE VISIT (OUTPATIENT)
Dept: CARDIOLOGY | Facility: CLINIC | Age: 57
End: 2017-10-23
Payer: COMMERCIAL

## 2017-10-23 VITALS — DIASTOLIC BLOOD PRESSURE: 5 MMHG | SYSTOLIC BLOOD PRESSURE: 120 MMHG | HEART RATE: 63 BPM | OXYGEN SATURATION: 96 %

## 2017-10-23 DIAGNOSIS — I48.0 PAROXYSMAL ATRIAL FIBRILLATION (H): Primary | ICD-10-CM

## 2017-10-23 DIAGNOSIS — R06.02 SOB (SHORTNESS OF BREATH): ICD-10-CM

## 2017-10-23 PROCEDURE — 99203 OFFICE O/P NEW LOW 30 MIN: CPT | Performed by: INTERNAL MEDICINE

## 2017-10-23 ASSESSMENT — PAIN SCALES - GENERAL: PAINLEVEL: MILD PAIN (2)

## 2017-10-23 NOTE — NURSING NOTE
Cardiac Testing: Patient given instructions regarding cardiac stress MRI.  Discussed purpose, preparation, procedure and when to expect results reported back to the patient. Patient demonstrated understanding of this information and agreed to call with further questions or concerns.  Med Reconcile: Reviewed and verified all current medications with the patient. The updated medication list was printed and given to the patient.  Medication Change: Patient was educated regarding prescribed medication change, including discussion of the indication, administration, side effects, and when to report to MD or RN. Patient demonstrated understanding of this information and agreed to call with further questions or concerns.  If the cardiac stress test is negative.  Patient can start flecainide and then have a stress test 1 week after.  This will be arranged after the cardiac stress MRI is confirmed negative   Referral for weight management-pt is scheduled to see Dr. Solis on 11/22 at 2:30 pm  Follow up in 1/2 months  Patient stated she understood all health information given and agreed to call with further questions or concerns.  Marnie Lackey, RN  Care Coordinator  [facility]  [phone]

## 2017-10-23 NOTE — PROGRESS NOTES
"      Clinical Cardiac Electrophysiology    Chief Complaint: AF    HPI: Gladys Singh is a 57 year old female with a past medical history significant for hyperlipidemia, PSVT, PAF, essential HTN, hyperparathyroidism, depression, type 2 diabetes mellitus, anxiety, and KHUSHI.     Gladys Singh  presents for a second opinion on her palpitations and chest pain. She saw saw Dr. Mi on 10/4/2017, a CTA was ordered to rule out CAD.  She was unable to have they CTA done due to being in AF and PACs.     Patient states that never misses doses of medication.  Patient denies tobacco use. Drinking alcohol one glass or two of wine a year.  Drinking caffeine 2 cups coffee in the morning. States that activity level is light, walking dog most days for 20 minutes.  History of KHUSHI, uses her CPAP every night.  Sleeps with 2 pillows under head.  Denies headaches, syncope, dry cough, orthopnea, PND, abdominal pain, abdominal edema, or claudication.  Denies easy bruising or bleeding, hematuria, hematochezia, and epistaxis. Denies signs/symptoms of stroke such as visual disturbance, difficulty speaking, facial drooping, confusion, problems with gait, or any new numbness or weakness. She states she has not noted improvement of symptoms with metoprolol but has noted mildly increased fatigue.    Gladys Singh  s symptoms of palpitations started about 2-3 months ago and are characterized by \"fluttering\", \"squeezing\".  Episodes last a couple of minutes to several hours.  Symptoms are located upper left chest and do radiate to the middle of her chest.   Nothing noted makes aggravates symptoms and nothing noted relieves symptoms.  Timing of symptoms are all times of the day, worse in the afternoon.  Severity is 9/10, but no pain with palpitations.    Her symptoms of chest pain and pressure started about 6 months ago and are characterized by sharp pain center of chest.  Episodes last minutes to hours.  Symptoms are located " center of chest and do radiate to her upper back and left chest.   She has dyspnea with exertion on occasion but not all of the time..   Nothing noted makes aggravates symptoms and nothing relieves symptoms.  Timing of symptoms are throughout worse at night, at rest and sometimes with activity.  Sometimes activity makes it feel better.  Does not feel like her heartburn, has taken tums which has not helped.  Severity is 8/10.    Rhythm monitoring: ZIO patch 8/18/2017 showed PAF/AFlutter <1%, longest episode 2 minutes    Current Outpatient Prescriptions   Medication Sig Dispense Refill     docusate sodium (COLACE) 100 MG capsule TAKE 1 CAPSULE (100 MG) BY MOUTH 2 TIMES DAILY 180 capsule 2     lisinopril (PRINIVIL/ZESTRIL) 10 MG tablet TAKE 1 TABLET (10 MG) BY MOUTH DAILY 90 tablet 2     metoprolol (TOPROL-XL) 100 MG 24 hr tablet Take 1 tablet (100 mg) by mouth daily 90 tablet 3     levothyroxine (SYNTHROID/LEVOTHROID) 75 MCG tablet TAKE 1 TABLET (75 MG) BY MOUTH EVERY DAY 60 tablet 0     ranitidine (ZANTAC) 150 MG tablet TAKE 1 TABLET (150 MG) BY MOUTH 2 TIMES DAILY 60 tablet 5     simvastatin (ZOCOR) 20 MG tablet TAKE 1 TABLET (20 MG) BY MOUTH AT BEDTIME NEED LABS 90 tablet 1     cyanocobalamin ([employer]S VITAMIN  B12) 1000 MCG TABS TAKE 1 TABLET (1,000 MCG) BY MOUTH DAILY 90 tablet 1     Ferrous Sulfate ([employer]S SLOW RELEASE IRON) 143 (45 FE) MG TBCR Take 1 tablet by mouth 2 times daily 180 tablet 1     dabigatran ANTICOAGULANT (PRADAXA) 150 MG capsule Take 1 capsule (150 mg) by mouth twice daily. Store in original 's bottle or blister pack; use within 120 days of opening. 60 capsule 5     escitalopram (LEXAPRO) 20 MG tablet Take 1 tablet (20 mg) by mouth daily 90 tablet 1     ASPIRIN NOT PRESCRIBED (INTENTIONAL) Please choose reason not prescribed, below 0 each 0     BusPIRone HCl (BUSPAR PO) Take 30 mg by mouth 2 times daily       Cholecalciferol (VITAMIN D3) 3000 UNITS TABS        exenatide ER (BYDUREON) 2 MG  pen Inject 2 mg Subcutaneous every 7 days       POTASSIUM CHLORIDE 20 MEQ CR tablet TAKE 1 TABLET (20 MEQ) BY MOUTH 2 TIMES DAILY 180 tablet 0     vitamin D (ERGOCALCIFEROL) 17662 UNIT capsule TAKE 1 CAPSULE BY MOUTH ONCE WEEKLY. 8 capsule 1     furosemide (LASIX) 40 MG tablet TAKE 1 TABLET (40 MG) BY MOUTH DAILY 90 tablet 0     blood glucose monitoring (SAMUEL MICROLET) lancets TEST ONCE DAILY OR AS NEEDED *DISPENSE WHATEVER IS COVERED BY INS* 1 Box 3     calcium carbonate (OSCAL 500) 1250 (500 CA) MG TABS tablet TAKE 1 TABLET BY MOUTH. THREE TIMES DAILY. 270 tablet 0     Multiple Vitamins-Minerals (CVS SPECTRAVITE ULTRA WOMEN) TABS TAKE 1 TABLET BY MOUTH DAILY 90 tablet 3     omeprazole (PRILOSEC) 20 MG CR capsule Take 1 capsule (20 mg) by mouth 2 times daily 180 capsule 3     blood glucose monitoring (NO BRAND SPECIFIED) meter device kit Use to test blood sugar one times daily or as directed. 1 kit 0     blood glucose monitoring (NO BRAND SPECIFIED) test strip Use to test blood sugars one times daily or as directed 100 strip 3     insulin pen needle (B-D U/F) 31G X 5 MM Use once daily or as directed. 100 each prn     order for DME Test strips for pt's glucometer, brand as covered by insurance. Test twice daily or PRN. 1 Box prn     LORazepam (ATIVAN) 1 MG tablet Take 1 mg by mouth At Bedtime        modafinil (PROVIGIL) 200 MG tablet Take 0.5 tablets by mouth daily.       AMBIEN 10 MG OR TABS 1 TABLET DAILY AT BEDTIME         Past Medical History:   Diagnosis Date     Anxiety     sees Dr. Chance     Arthritis involving multiple sites     knees, hip     ASCUS (atypical squamous cells of undetermined significance) on Pap smear 3/13/14     Benign neoplasm of stomach      BMI 40.0-44.9, adult (H)     sees Dr. Quan     Cervical high risk human papillomavirus (HPV) DNA test positive 2010     Colon polyp 2005     Constipation, chronic      Crohn's disease (H) 2012     Depression     sees Dr. Elio Chance      Diverticulosis 2010     Fundic gland polyps of stomach, benign 2010     H/O colposcopy with cervical biopsy 13    no lesions seen.  repeat pap due in 6 months     Hiatal hernia 2010     High risk HPV infection 13    NIL pap/+ HR HPV (16)     Hyperlipidemia LDL goal <100      Hyperparathyroidism, unspecified     sees Dr Morris, Endocrine every 6 mos     Hypothyroidism     sees Dr. Morris -endo      Ileitis 12    ? crohn's     Iliotibial band friction syndrome of both knees      Ingrown toenail      Iron deficiency     on iron bid. had egd and colon per Dr. Sheppard.     KHUSHI (obstructive sleep apnea)     CPAP     Papanicolaou smear of cervix with atypical squamous cells of undetermined significance (ASC-US)      Pedal edema     on lasix and potasium     Plantar fasciitis     resolved     Type II or unspecified type diabetes mellitus without mention of complication, not stated as uncontrolled     resolved     Vitamin D deficiency     sees Dr Morris, Endocrine every 6 mos       Past Surgical History:   Procedure Laterality Date     APPENDECTOMY       ARTHROSCOPY KNEE RT/LT       C  DELIVERY ONLY      x 2     C TOTAL KNEE ARTHROPLASTY  2004    bilateral     COLONOSCOPY  , ,     early Crohn's     HC COLP CERVIX/UPPER VAGINA W BX CERVIX  ,     neg     HC ESOPHAGOSCOPY, DIAGNOSTIC  , ,     benign, fungal infection     HC REPAIR OF NASAL SEPTUM       HERNIA REPAIR, UMBILICAL       PE TUBES       TONSILLECTOMY & ADENOIDECTOMY       total knee Bilateral 2004    BRAD Mikael Prasad MD       Family History   Problem Relation Age of Onset     DIABETES Mother      CANCER Mother      endometrial     Arrhythmia Mother      DIABETES Father      Prostate Cancer Father      C.A.D. Father      CABGx3, Valve replacement, stents     DIABETES Maternal Grandmother      CEREBROVASCULAR DISEASE Paternal Grandfather      Cancer - colorectal Sister      Neurologic Disorder  Daughter      anxiety     Neurologic Disorder Daughter      anxiety, bipolar       Social History   Substance Use Topics     Smoking status: Former Smoker     Packs/day: 1.50     Years: 13.00     Quit date: 1/1/1994     Smokeless tobacco: Never Used     Alcohol use No       Allergies   Allergen Reactions     Augmentin GI Disturbance     Victoza Other (See Comments)     Abdominal pain         ROS:   Negative except for as indicated in HPI.    Physical Examination:  Vitals: BP (!) 120/5 (BP Location: Right arm, Patient Position: Chair, Cuff Size: Adult Large)  Pulse 63  LMP 02/01/2010  SpO2 96%  BMI= There is no height or weight on file to calculate BMI.    GENERAL APPEARANCE: healthy, alert, and no acute distress  HEENT: no icterus, no xanthelasmas, normal pupil size and reaction, no cyanosis.  NECK: no asymmetry, thyroid normal to palpation, carotid upstrokes are normal bilaterally, no cervical bruits, no JVD   CHEST: lungs clear to auscultation - no rales, rhonchi or wheezes, no use of accessory muscles, no retractions, respirations are unlabored, normal respiratory rate  CARDIOVASCULAR: regular rhythm, normal S1 with physiologic split S2, no S3 or S4 and no murmur, click or rub, precordium quiet with normal PMI.  ABDOMEN: soft, non-tender, without hepatosplenomegaly, no masses palpable, bowel sounds normal, aorta not enlarged by palpation, no abdominal bruits  EXTREMITIES: no clubbing, cyanosis, or edema  NEURO: alert and oriented to person/place/time, normal speech, gait and affect  VASC: Radial, dorsalis pedis, and posterior tibialis pulses +2 and symmetric bilaterally.   SKIN: no ecchymoses, no rashes    Laboratory:  Cholesterol (mg/dL)   Date Value   07/07/2016 153   04/07/2016 186   02/27/2015 152   12/31/2013 173     Cholesterol/HDL Ratio (no units)   Date Value   02/27/2015 3.2   12/31/2013 4.8   08/08/2012 3.6   03/22/2011 3.29     HDL Cholesterol (mg/dL)   Date Value   07/07/2016 39 (L)   04/07/2016 46  (L)   2015 48 (L)   2013 36 (L)     LDL Cholesterol Calculated (mg/dL)   Date Value   2016 77   2016 113 (H)   2015 66   2013 92     LDL Cholesterol Direct (mg/dL)   Date Value   2017 85      ECHO: 2017  Global and regional left ventricular function is normal with an EF of 60-65%.  Global right ventricular function is normal.  Pulmonary artery systolic pressure cannot be assessed.  Estimated mean right atrial pressure is 8 mmHg.  No pericardial effusion is present.  Compared to previous study dated 3/6/2013, there has been no significant  change.    Assessment and recommendations:    ATRIAL FIBRILLATION:  We discussed in detail with the patient management/treatment options for A.fib includin. Stroke Prophylaxis:  CHADSVASC=female +, HTN +, DM +  3, corresponding to a 3.2% annual stroke / systemic emolism event rate. indicating need for long term oral anticoagulation.   Continue dabigatran 150mg twice daily.    2. Rate Control: Continue metoprolol XL 100mg daily.   3. Rhythm Control: If stress MRI results are normal, initiate flecanide 75mg twice daily and follow up with an exercise stress test one week later.  Ablations may be considered in patients with highly symptomatic episodes that are not controlled by medication.   4. Keep healthy cholesterol levels. Continue simvastatin.  5. Maintain BP control. Continue lisinopril.  6.  Regular moderate intensity exercise. Continue walking your dog 20-30 minutes/day as tolerated.  7. Maintain a healthy weight.  Referred to weight management program with Dr. Solis.  8. Llimit caffeine and alcohol use.    9. KHUSHI, continue to use the CPAP.     CHEST PAIN:    1.  Stress MRI for evaluation of coronary arteries and chest pain prior to AAT initiation.    FOLLOW UP:  Follow up with EP in 1-2 months.    Patient expresses understanding and agreement with the plan.    I appreciate the chance to help with Gladys obrien.  Please let me know if you have any questions or concerns.    AMBROSE LezamaC      Patient seen and examined and management plan personally reviewed with the patient. I agree with the note above by JACKELINE/PA, Dori Dominguez. I reviewed today's vital signs and medications. I have reviewed and discussed with the advanced practice provider their physical examination, assessment, and plan.     My key history/exam findings: paroxysmal AF      Key management decisions made by me: rule out myocardial ischemia before initiating flecainide.    Ira Pierre MD, MS  EP/Cardiology Staff

## 2017-10-23 NOTE — NURSING NOTE
"Chief Complaint   Patient presents with     Atrial Fib     New pt appt with Dr. Pierre. Previously saw Dr. Mi on 10/4/17 and is seeing Dr. Pierre as a second opinion for a-fib management. Pt c/o chest pain that feels like squeezing or 'like having a heart attack\". C/o fluttering with pain, sob and sweating with minimal exertion. Has been getting dizzy for the last week. Is more fatigued. States all sx are worse at night.       Initial BP (!) 120/5 (BP Location: Right arm, Patient Position: Chair, Cuff Size: Adult Large)  Pulse 63  LMP 02/01/2010  SpO2 96% Estimated body mass index is 47.23 kg/(m^2) as calculated from the following:    Height as of 9/20/17: 1.657 m (5' 5.25\").    Weight as of 9/20/17: 129.7 kg (286 lb)..  BP completed using cuff size: noel Kaiser CMA    "

## 2017-10-23 NOTE — LETTER
"10/23/2017      RE: Gladys Singh  [address]  [place] [address]       Dear Colleague,    Thank you for the opportunity to participate in the care of your patient, Gladys Singh, at the [facility] AT [facility] Please see a copy of my visit note below.        Clinical Cardiac Electrophysiology    Chief Complaint: AF    HPI: Gladys Singh is a 57 year old female with a past medical history significant for hyperlipidemia, PSVT, PAF, essential HTN, hyperparathyroidism, depression, type 2 diabetes mellitus, anxiety, and KHUSHI.     Gladys Singh  presents for a second opinion on her palpitations and chest pain. She saw saw Dr. Mi on 10/4/2017, a CTA was ordered to rule out CAD.  She was unable to have they CTA done due to being in AF and PACs.     Patient states that never misses doses of medication.  Patient denies tobacco use. Drinking alcohol one glass or two of wine a year.  Drinking caffeine 2 cups coffee in the morning. States that activity level is light, walking dog most days for 20 minutes.  History of KHUSHI, uses her CPAP every night.  Sleeps with 2 pillows under head.  Denies headaches, syncope, dry cough, orthopnea, PND, abdominal pain, abdominal edema, or claudication.  Denies easy bruising or bleeding, hematuria, hematochezia, and epistaxis. Denies signs/symptoms of stroke such as visual disturbance, difficulty speaking, facial drooping, confusion, problems with gait, or any new numbness or weakness. She states she has not noted improvement of symptoms with metoprolol but has noted mildly increased fatigue.    Gladys Singh  s symptoms of palpitations started about 2-3 months ago and are characterized by \"fluttering\", \"squeezing\".  Episodes last a couple of minutes to several hours.  Symptoms are located upper left chest and do radiate to the middle of her chest.   " Nothing noted makes aggravates symptoms and nothing noted relieves symptoms.  Timing of symptoms are all times of the day, worse in the afternoon.  Severity is 9/10, but no pain with palpitations.    Her symptoms of chest pain and pressure started about 6 months ago and are characterized by sharp pain center of chest.  Episodes last minutes to hours.  Symptoms are located center of chest and do radiate to her upper back and left chest.   She has dyspnea with exertion on occasion but not all of the time..   Nothing noted makes aggravates symptoms and nothing relieves symptoms.  Timing of symptoms are throughout worse at night, at rest and sometimes with activity.  Sometimes activity makes it feel better.  Does not feel like her heartburn, has taken tums which has not helped.  Severity is 8/10.    Rhythm monitoring: ZIO patch 8/18/2017 showed PAF/AFlutter <1%, longest episode 2 minutes    Current Outpatient Prescriptions   Medication Sig Dispense Refill     docusate sodium (COLACE) 100 MG capsule TAKE 1 CAPSULE (100 MG) BY MOUTH 2 TIMES DAILY 180 capsule 2     lisinopril (PRINIVIL/ZESTRIL) 10 MG tablet TAKE 1 TABLET (10 MG) BY MOUTH DAILY 90 tablet 2     metoprolol (TOPROL-XL) 100 MG 24 hr tablet Take 1 tablet (100 mg) by mouth daily 90 tablet 3     levothyroxine (SYNTHROID/LEVOTHROID) 75 MCG tablet TAKE 1 TABLET (75 MG) BY MOUTH EVERY DAY 60 tablet 0     ranitidine (ZANTAC) 150 MG tablet TAKE 1 TABLET (150 MG) BY MOUTH 2 TIMES DAILY 60 tablet 5     simvastatin (ZOCOR) 20 MG tablet TAKE 1 TABLET (20 MG) BY MOUTH AT BEDTIME NEED LABS 90 tablet 1     cyanocobalamin ([employer]S VITAMIN  B12) 1000 MCG TABS TAKE 1 TABLET (1,000 MCG) BY MOUTH DAILY 90 tablet 1     Ferrous Sulfate ([employer]S SLOW RELEASE IRON) 143 (45 FE) MG TBCR Take 1 tablet by mouth 2 times daily 180 tablet 1     dabigatran ANTICOAGULANT (PRADAXA) 150 MG capsule Take 1 capsule (150 mg) by mouth twice daily. Store in original 's bottle or blister pack; use  within 120 days of opening. 60 capsule 5     escitalopram (LEXAPRO) 20 MG tablet Take 1 tablet (20 mg) by mouth daily 90 tablet 1     ASPIRIN NOT PRESCRIBED (INTENTIONAL) Please choose reason not prescribed, below 0 each 0     BusPIRone HCl (BUSPAR PO) Take 30 mg by mouth 2 times daily       Cholecalciferol (VITAMIN D3) 3000 UNITS TABS        exenatide ER (BYDUREON) 2 MG pen Inject 2 mg Subcutaneous every 7 days       POTASSIUM CHLORIDE 20 MEQ CR tablet TAKE 1 TABLET (20 MEQ) BY MOUTH 2 TIMES DAILY 180 tablet 0     vitamin D (ERGOCALCIFEROL) 02690 UNIT capsule TAKE 1 CAPSULE BY MOUTH ONCE WEEKLY. 8 capsule 1     furosemide (LASIX) 40 MG tablet TAKE 1 TABLET (40 MG) BY MOUTH DAILY 90 tablet 0     blood glucose monitoring (SAMUEL MICROLET) lancets TEST ONCE DAILY OR AS NEEDED *DISPENSE WHATEVER IS COVERED BY INS* 1 Box 3     calcium carbonate (OSCAL 500) 1250 (500 CA) MG TABS tablet TAKE 1 TABLET BY MOUTH. THREE TIMES DAILY. 270 tablet 0     Multiple Vitamins-Minerals (CVS SPECTRAVITE ULTRA WOMEN) TABS TAKE 1 TABLET BY MOUTH DAILY 90 tablet 3     omeprazole (PRILOSEC) 20 MG CR capsule Take 1 capsule (20 mg) by mouth 2 times daily 180 capsule 3     blood glucose monitoring (NO BRAND SPECIFIED) meter device kit Use to test blood sugar one times daily or as directed. 1 kit 0     blood glucose monitoring (NO BRAND SPECIFIED) test strip Use to test blood sugars one times daily or as directed 100 strip 3     insulin pen needle (B-D U/F) 31G X 5 MM Use once daily or as directed. 100 each prn     order for DME Test strips for pt's glucometer, brand as covered by insurance. Test twice daily or PRN. 1 Box prn     LORazepam (ATIVAN) 1 MG tablet Take 1 mg by mouth At Bedtime        modafinil (PROVIGIL) 200 MG tablet Take 0.5 tablets by mouth daily.       AMBIEN 10 MG OR TABS 1 TABLET DAILY AT BEDTIME         Past Medical History:   Diagnosis Date     Anxiety     sees Dr. Chance     Arthritis involving multiple sites     knees, hip      ASCUS (atypical squamous cells of undetermined significance) on Pap smear 3/13/14     Benign neoplasm of stomach      BMI 40.0-44.9, adult (H)     sees Dr. Quan     Cervical high risk human papillomavirus (HPV) DNA test positive      Colon polyp      Constipation, chronic      Crohn's disease (H) 2012     Depression     sees Dr. Elio Chance     Diverticulosis 2010     Fundic gland polyps of stomach, benign 2010     H/O colposcopy with cervical biopsy 13    no lesions seen.  repeat pap due in 6 months     Hiatal hernia 2010     High risk HPV infection 13    NIL pap/+ HR HPV (16)     Hyperlipidemia LDL goal <100      Hyperparathyroidism, unspecified     sees Dr Morris, Endocrine every 6 mos     Hypothyroidism     sees Dr. Morris -endo      Ileitis 12    ? crohn's     Iliotibial band friction syndrome of both knees      Ingrown toenail      Iron deficiency     on iron bid. had egd and colon per Dr. Sheppard.     KHUSHI (obstructive sleep apnea)     CPAP     Papanicolaou smear of cervix with atypical squamous cells of undetermined significance (ASC-US)      Pedal edema     on lasix and potasium     Plantar fasciitis     resolved     Type II or unspecified type diabetes mellitus without mention of complication, not stated as uncontrolled     resolved     Vitamin D deficiency     sees Dr Morris, Endocrine every 6 mos       Past Surgical History:   Procedure Laterality Date     APPENDECTOMY       ARTHROSCOPY KNEE RT/LT       C  DELIVERY ONLY      x 2     C TOTAL KNEE ARTHROPLASTY  2004    bilateral     COLONOSCOPY  , ,     early Crohn's     HC COLP CERVIX/UPPER VAGINA W BX CERVIX  ,     neg     HC ESOPHAGOSCOPY, DIAGNOSTIC  , ,     benign, fungal infection     HC REPAIR OF NASAL SEPTUM       HERNIA REPAIR, UMBILICAL       PE TUBES       TONSILLECTOMY & ADENOIDECTOMY       total knee Bilateral 2004    BRAD Mikael Prasad MD       Family History    Problem Relation Age of Onset     DIABETES Mother      CANCER Mother      endometrial     Arrhythmia Mother      DIABETES Father      Prostate Cancer Father      C.A.D. Father      CABGx3, Valve replacement, stents     DIABETES Maternal Grandmother      CEREBROVASCULAR DISEASE Paternal Grandfather      Cancer - colorectal Sister      Neurologic Disorder Daughter      anxiety     Neurologic Disorder Daughter      anxiety, bipolar       Social History   Substance Use Topics     Smoking status: Former Smoker     Packs/day: 1.50     Years: 13.00     Quit date: 1/1/1994     Smokeless tobacco: Never Used     Alcohol use No       Allergies   Allergen Reactions     Augmentin GI Disturbance     Victoza Other (See Comments)     Abdominal pain         ROS:   Negative except for as indicated in HPI.    Physical Examination:  Vitals: BP (!) 120/5 (BP Location: Right arm, Patient Position: Chair, Cuff Size: Adult Large)  Pulse 63  LMP 02/01/2010  SpO2 96%  BMI= There is no height or weight on file to calculate BMI.    GENERAL APPEARANCE: healthy, alert, and no acute distress  HEENT: no icterus, no xanthelasmas, normal pupil size and reaction, no cyanosis.  NECK: no asymmetry, thyroid normal to palpation, carotid upstrokes are normal bilaterally, no cervical bruits, no JVD   CHEST: lungs clear to auscultation - no rales, rhonchi or wheezes, no use of accessory muscles, no retractions, respirations are unlabored, normal respiratory rate  CARDIOVASCULAR: regular rhythm, normal S1 with physiologic split S2, no S3 or S4 and no murmur, click or rub, precordium quiet with normal PMI.  ABDOMEN: soft, non-tender, without hepatosplenomegaly, no masses palpable, bowel sounds normal, aorta not enlarged by palpation, no abdominal bruits  EXTREMITIES: no clubbing, cyanosis, or edema  NEURO: alert and oriented to person/place/time, normal speech, gait and affect  VASC: Radial, dorsalis pedis, and posterior tibialis pulses +2 and symmetric  bilaterally.   SKIN: no ecchymoses, no rashes    Laboratory:  Cholesterol (mg/dL)   Date Value   2016 153   2016 186   2015 152   2013 173     Cholesterol/HDL Ratio (no units)   Date Value   2015 3.2   2013 4.8   2012 3.6   2011 3.29     HDL Cholesterol (mg/dL)   Date Value   2016 39 (L)   2016 46 (L)   2015 48 (L)   2013 36 (L)     LDL Cholesterol Calculated (mg/dL)   Date Value   2016 77   2016 113 (H)   2015 66   2013 92     LDL Cholesterol Direct (mg/dL)   Date Value   2017 85      ECHO: 2017  Global and regional left ventricular function is normal with an EF of 60-65%.  Global right ventricular function is normal.  Pulmonary artery systolic pressure cannot be assessed.  Estimated mean right atrial pressure is 8 mmHg.  No pericardial effusion is present.  Compared to previous study dated 3/6/2013, there has been no significant  change.    Assessment and recommendations:    ATRIAL FIBRILLATION:  We discussed in detail with the patient management/treatment options for A.fib includin. Stroke Prophylaxis:  CHADSVASC=female +, HTN +, DM +  3, corresponding to a 3.2% annual stroke / systemic emolism event rate. indicating need for long term oral anticoagulation.   Continue dabigatran 150mg twice daily.    2. Rate Control: Continue metoprolol XL 100mg daily.   3. Rhythm Control: If stress MRI results are normal, initiate flecanide 75mg twice daily and follow up with an exercise stress test one week later.  Ablations may be considered in patients with highly symptomatic episodes that are not controlled by medication.   4. Keep healthy cholesterol levels. Continue simvastatin.  5. Maintain BP control. Continue lisinopril.  6.  Regular moderate intensity exercise. Continue walking your dog 20-30 minutes/day as tolerated.  7. Maintain a healthy weight.  Referred to weight management program with Dr. Solis.  8.  Llimit caffeine and alcohol use.    9. KHUSHI, continue to use the CPAP.     CHEST PAIN:    1.  Stress MRI for evaluation of coronary arteries and chest pain prior to AAT initiation.    FOLLOW UP:  Follow up with EP in 1-2 months.    Patient expresses understanding and agreement with the plan.    I appreciate the chance to help with Gladys Singh care. Please let me know if you have any questions or concerns.    Dori VIVEROS, NP-C      Patient seen and examined and management plan personally reviewed with the patient. I agree with the note above by JACKELINE/PA, Dori Dominguez. I reviewed today's vital signs and medications. I have reviewed and discussed with the advanced practice provider their physical examination, assessment, and plan.     My key history/exam findings: paroxysmal AF      Key management decisions made by me: rule out myocardial ischemia before initiating flecainide.    Ira Pierre MD, MS  EP/Cardiology Staff

## 2017-10-23 NOTE — PATIENT INSTRUCTIONS
1.  Dr. Ira Pierre has ordered a Cardiac stress MRI to be performed on you. We have scheduled this test at the [address] on Friday, November 3rd at 8:!5 am  Please arrive 30 minutes early to the [facility] to allow enough time for registration.  The cardiology nurse will contact you regarding the results of your Cardiac MRI (please see result notification details at bottom of summary).        [facility] Cardiology - [place] Location    If you have any questions regarding to your visit please contact your care team:     Cardiology  Telephone Number   Jeannette Logan  Cardiology RN's.    Mavis Kaiser CMA [phone]    After hours: [phone].  [phone]   For scheduling appts:     [phone] or  [phone]    After hours: [phone]   For the Device Clinic (Pacemakers and ICD's)  RN's :  Heena Strange   During business hours: [phone]  After business hours:  [phone]- select option 4.      If you need a medication refill please contact your pharmacy.  Please allow 3 business days for your refill to be completed.    As always, Thank you for trusting us with your health care needs!  _____________________________________________________________________

## 2017-10-23 NOTE — MR AVS SNAPSHOT
After Visit Summary   10/23/2017    Gladys Singh    MRN: [id number]           Patient Information     Date Of Birth          1960        Visit Information        Provider Department      10/23/2017 1:40 PM Ira Pierre MD [facility] PHYSIC[facility]ANS HEART AT [facility]        Today's Diagnoses     Paroxysmal atrial fibrillation (H)    -  1    SOB (shortness of breath)        Chest pain          Care Instructions    1.  Dr. Ira Pierre has ordered a Cardiac stress MRI to be performed on you. We have scheduled this test at the [address] on Friday, November 3rd at 8:!5 am  Please arrive 30 minutes early to the [facility] LOBBY to allow enough time for registration.  The cardiology nurse will contact you regarding the results of your Cardiac MRI (please see result notification details at bottom of summary).        [facility] Cardiology - [place] Location    If you have any questions regarding to your visit please contact your care team:     Cardiology  Telephone Number   Marnie Jeannette Lackey  Cardiology RN's.    Mavis Kaiser CMA [phone]    After hours: [phone].  [phone]   For scheduling appts:     [phone] or  [phone]    After hours: [phone]   For the Device Clinic (Pacemakers and ICD's)  RN's :  Heena Strange   During business hours: [phone]  After business hours:  [phone]- select option 4.      If you need a medication refill please contact your pharmacy.  Please allow 3 business days for your refill to be completed.    As always, Thank you for trusting us with your health care needs!  _____________________________________________________________________              Follow-ups after your visit        Your next 10 appointments already scheduled     Nov 03, 2017  8:15 AM CDT   MR CARDIAC W CONTRAST AND STRESS with UUMR4, UU CV MR NURSE   [facility], MRI ([facility],  [facility])    [address] [id number] [phone]           Take your medicines as usual, unless your doctor tells you not to.   If you take Viagra, Levitra or Cialis, stop taking it 48 hours before your test.   If you take Aggrenox or dipyridamole (Persantine, Permole), stop taking it 48 hours before your test.   If you take Theophylline or Aminophylline, stop taking it 12 hours before your test.   If you are diabetic and take oral hypoglycemics, do not take them on the day of your test.  The day before your exam, drink extra fluids at least six 8-ounce glasses (unless your doctor wants you to limit your fluids).  Stop all caffeine 12 hours before the test. This includes coffee, tea, soda, chocolate and certain medicines (such as Anacin, Excedrin and NoDoz). Also avoid decaf coffee and tea, as these contain small amounts of caffeine.  Do not eat or drink for 3 hours before your exam. You may drink water and take your morning medicines.  You may need a blood test (creatinine test) within 30 days of your exam. Follow your doctor s orders if this is arranged before your exam.  If you are very claustrophobic, please let you doctor know. You may get a sedative medicine from your doctor before you arrive. Bring the medicine to the exam. Do not take it at home. Arrive one hour early. Bring someone who can take you home after the test. Your medicine will make you sleepy. After the exam, you may not drive, take a bus or take a taxi by yourself.  The MRI machine uses a strong magnet. Please wear clothes without metal (snaps, zippers). A sweatsuit works well, or we may give you a hospital gown.  Please remove any body piercings and hair extensions before you arrive. You will remove watches, jewelry, hairpins, wallets, dentures, partial dental plates and hearing aids. You may wear contact lenses, and you may be able to wear your rings. We have a safe place to keep your personal items,  but it is safer to leave them at home.   **IMPORTANT** THE INSTRUCTIONS BELOW ARE ONLY FOR THOSE PATIENTS WHO HAVE BEEN TOLD THEY WILL RECEIVE SEDATION OR GENERAL ANESTHESIA DURING THEIR MRI PROCEDURE:  IF YOU WILL RECEIVE SEDATION (take medicine to help you relax during your exam):   You must get the medicine from your doctor before you arrive. Bring the medicine to the exam. Do not take it at home.   Arrive one hour early. Bring someone who can take you home after the test. Your medicine will make you sleepy. After the exam, you may not drive, take a bus or take a taxi by yourself.   No eating 8 hours before your exam. You may have clear liquids up until 4 hours before your exam. (Clear liquids include water, clear tea, black coffee and fruit juice without pulp.)  IF YOU WILL RECEIVE ANESTHESIA (be asleep for your exam):   Arrive 1 1/2 hours early. Bring someone who can take you home after the test. You may not drive, take a bus or take a taxi by yourself.   No eating 8 hours before your exam. You may have clear liquids up until 4 hours before your exam. (Clear liquids include water, clear tea, black coffee and fruit juice without pulp.)  If you have any questions, please contact your Imaging Department exam site.            Nov 14, 2017  9:30 AM CST   Return Visit with Marium Mi MD   [facility] PHYSICIANS HEART AT [facility] ([facility])    [address] [address]   [phone]            Nov 22, 2017  2:30 PM CST   CONSULT with Octavia Solis MD   [facility]    [address] [place] [id number]   [phone]              Future tests that were ordered for you today     Open Future Orders        Priority Expected Expires Ordered    MRI Cardiac w/contrast and stress Routine  10/23/2018 10/23/2017            Who to contact     If you have questions or need follow up information about today's clinic  visit or your schedule please contact [facility] PHYSICIANS HEART AT [facility] directly at [phone].  Normal or non-critical lab and imaging results will be communicated to you by MyChart, letter or phone within 4 business days after the clinic has received the results. If you do not hear from us within 7 days, please contact the clinic through MyChart or phone. If you have a critical or abnormal lab result, we will notify you by phone as soon as possible.  Submit refill requests through Yub or call your pharmacy and they will forward the refill request to us. Please allow 3 business days for your refill to be completed.          Additional Information About Your Visit        Tetris Onlinehart Information     Yub gives you secure access to your electronic health record. If you see a primary care provider, you can also send messages to your care team and make appointments. If you have questions, please call your primary care clinic.  If you do not have a primary care provider, please call [phone] and they will assist you.        Care EveryWhere ID     This is your Care EveryWhere ID. This could be used by other organizations to access your [place] medical records  [id number]        Your Vitals Were     Pulse Last Period Pulse Oximetry             63 02/01/2010 96%          Blood Pressure from Last 3 Encounters:   10/23/17 (!) 120/5   10/05/17 130/72   10/04/17 127/84    Weight from Last 3 Encounters:   09/20/17 129.7 kg (286 lb)   09/08/17 129.3 kg (285 lb)   09/01/17 128.5 kg (283 lb 6.4 oz)               Primary Care Provider Office Phone # Fax #    Vania JACKELINE [facility] [phone]       [address] [address]        Equal Access to Services     OSCAR URBANO : Luis Alfredo Land, waaxda luqadaha, qaybta kaalmadelilah baxter, chelsie mata. So [facility] [phone].    ATENCIÓN: Si reji espino, janice king eason  disposición servicios gratuitos de asistencia lingüística. Cathryn ayala [phone].    We comply with applicable federal civil rights laws and [place] laws. We do not discriminate on the basis of race, color, national origin, age, disability, sex, sexual orientation, or gender identity.            Thank you!     Thank you for choosing [facility] PHYSICIANS HEART AT [facility]  for your care. Our goal is always to provide you with excellent care. Hearing back from our patients is one way we can continue to improve our services. Please take a few minutes to complete the written survey that you may receive in the mail after your visit with us. Thank you!             Your Updated Medication List - Protect others around you: Learn how to safely use, store and throw away your medicines at www.disposemymeds.org.          This list is accurate as of: 10/23/17  2:46 PM.  Always use your most recent med list.                   Brand Name Dispense Instructions for use Diagnosis    AMBIEN 10 MG tablet   Generic drug:  zolpidem      1 TABLET DAILY AT BEDTIME        ASPIRIN NOT PRESCRIBED    INTENTIONAL    0 each    Please choose reason not prescribed, below    Paroxysmal atrial fibrillation (H)       blood glucose monitoring lancets     1 Box    TEST ONCE DAILY OR AS NEEDED *DISPENSE WHATEVER IS COVERED BY INS*    Type 2 diabetes mellitus without complication, without long-term current use of insulin (H)       blood glucose monitoring meter device kit    no brand specified    1 kit    Use to test blood sugar one times daily or as directed.    Type 2 diabetes mellitus without complication, without long-term current use of insulin (H)       blood glucose monitoring test strip    no brand specified    100 strip    Use to test blood sugars one times daily or as directed    Type 2 diabetes mellitus without complication, without long-term current use of insulin (H)       BUSPAR PO      Take 30 mg by mouth 2 times  daily        BYDUREON 2 MG pen   Generic drug:  exenatide ER      Inject 2 mg Subcutaneous every 7 days        calcium carbonate 1250 (500 CA) MG Tabs tablet    OSCAL 500    270 tablet    TAKE 1 TABLET BY MOUTH. THREE TIMES DAILY.    Elevated PTHrP level (H)       CVS SPECTRAVITE ULTRA WOMEN Tabs     90 tablet    TAKE 1 TABLET BY MOUTH DAILY    Constipation, chronic       cyanocobalamin 1000 MCG Tabs    CVS vitamin  B12    90 tablet    TAKE 1 TABLET (1,000 MCG) BY MOUTH DAILY    Vitamin B12 deficiency (non anemic)       dabigatran ANTICOAGULANT 150 MG capsule    PRADAXA    60 capsule    Take 1 capsule (150 mg) by mouth twice daily. Store in original 's bottle or blister pack; use within 120 days of opening.    Paroxysmal atrial fibrillation (H)       docusate sodium 100 MG capsule    COLACE    180 capsule    TAKE 1 CAPSULE (100 MG) BY MOUTH 2 TIMES DAILY    Constipation, unspecified constipation type       escitalopram 20 MG tablet    LEXAPRO    90 tablet    Take 1 tablet (20 mg) by mouth daily    Paroxysmal atrial fibrillation (H)       Ferrous Sulfate 143 (45 FE) MG Tbcr    CVS SLOW RELEASE IRON    180 tablet    Take 1 tablet by mouth 2 times daily    Iron deficiency       furosemide 40 MG tablet    LASIX    90 tablet    TAKE 1 TABLET (40 MG) BY MOUTH DAILY    Pedal edema       insulin pen needle 31G X 5 MM    B-D U/F    100 each    Use once daily or as directed.    Type 2 diabetes mellitus without complication (H)       KLOR-CON 20 MEQ CR tablet   Generic drug:  potassium chloride SA     180 tablet    TAKE 1 TABLET (20 MEQ) BY MOUTH 2 TIMES DAILY    Diuresis       levothyroxine 75 MCG tablet    SYNTHROID/LEVOTHROID    60 tablet    TAKE 1 TABLET (75 MG) BY MOUTH EVERY DAY    Acquired hypothyroidism, Vitamin D deficiency, Hyperparathyroidism (H), Abdominal pain, epigastric       lisinopril 10 MG tablet    PRINIVIL/ZESTRIL    90 tablet    TAKE 1 TABLET (10 MG) BY MOUTH DAILY    Type 2 diabetes mellitus  without complication, without long-term current use of insulin (H), Essential hypertension with goal blood pressure less than 140/90       LORazepam 1 MG tablet    ATIVAN     Take 1 mg by mouth At Bedtime        metoprolol 100 MG 24 hr tablet    TOPROL-XL    90 tablet    Take 1 tablet (100 mg) by mouth daily    Paroxysmal atrial fibrillation (H), Chest pain, unspecified type       omeprazole 20 MG CR capsule    priLOSEC    180 capsule    Take 1 capsule (20 mg) by mouth 2 times daily    Hiatal hernia       order for DME     1 Box    Test strips for pt's glucometer, brand as covered by insurance. Test twice daily or PRN.    Type 2 diabetes mellitus without complication (H)       PROVIGIL 200 MG tablet   Generic drug:  modafinil      Take 0.5 tablets by mouth daily.        ranitidine 150 MG tablet    ZANTAC    60 tablet    TAKE 1 TABLET (150 MG) BY MOUTH 2 TIMES DAILY    Gastroesophageal reflux disease, esophagitis presence not specified       simvastatin 20 MG tablet    ZOCOR    90 tablet    TAKE 1 TABLET (20 MG) BY MOUTH AT BEDTIME NEED LABS    Hyperlipidemia LDL goal <100       vitamin D 70284 UNIT capsule    ERGOCALCIFEROL    8 capsule    TAKE 1 CAPSULE BY MOUTH ONCE WEEKLY.    Vitamin D deficiency, Hyperparathyroidism (H), Acquired hypothyroidism, Abdominal pain, epigastric       Vitamin D3 3000 UNITS Tabs

## 2017-10-31 ENCOUNTER — OFFICE VISIT (OUTPATIENT)
Dept: INTERNAL MEDICINE | Facility: CLINIC | Age: 57
End: 2017-10-31
Payer: COMMERCIAL

## 2017-10-31 VITALS
BODY MASS INDEX: 47.06 KG/M2 | WEIGHT: 285 LBS | DIASTOLIC BLOOD PRESSURE: 78 MMHG | SYSTOLIC BLOOD PRESSURE: 120 MMHG | OXYGEN SATURATION: 97 % | TEMPERATURE: 96.5 F | HEART RATE: 76 BPM

## 2017-10-31 DIAGNOSIS — I48.0 PAROXYSMAL ATRIAL FIBRILLATION (H): ICD-10-CM

## 2017-10-31 DIAGNOSIS — E11.9 TYPE 2 DIABETES MELLITUS WITHOUT COMPLICATION, WITHOUT LONG-TERM CURRENT USE OF INSULIN (H): ICD-10-CM

## 2017-10-31 DIAGNOSIS — H66.005 RECURRENT ACUTE SUPPURATIVE OTITIS MEDIA WITHOUT SPONTANEOUS RUPTURE OF LEFT TYMPANIC MEMBRANE: Primary | ICD-10-CM

## 2017-10-31 PROCEDURE — 99213 OFFICE O/P EST LOW 20 MIN: CPT | Performed by: NURSE PRACTITIONER

## 2017-10-31 RX ORDER — AMOXICILLIN 500 MG/1
500 CAPSULE ORAL 3 TIMES DAILY
Qty: 21 CAPSULE | Refills: 0 | Status: SHIPPED | OUTPATIENT
Start: 2017-10-31 | End: 2017-11-07

## 2017-10-31 NOTE — PATIENT INSTRUCTIONS
[facility]    If you have any questions regarding to your visit please contact your care team:     Team Pink:   Clinic Hours Telephone Number   Internal Medicine:  Dr. Octavia Monroe NP       7am-7pm  Monday - Thursday   7am-5pm  Fridays  [phone]  (Appointment scheduling available 24/7)    Questions about your visit?  Team Line  [phone]   Urgent Care - [place] [facility] and [facility] - 11am-9pm Monday-Friday Saturday-Sunday- 9am-5pm   [place] - 5pm-9pm Monday-Friday Saturday-Sunday- 9am-5pm  [phone] - [place]   [phone] - [place]       What options do I have for visits at the clinic other than the traditional office visit?  To expand how we care for you, many of our providers are utilizing electronic visits (e-visits) and telephone visits, when medically appropriate, for interactions with their patients rather than a visit in the clinic.   We also offer nurse visits for many medical concerns. Just like any other service, we will bill your insurance company for this type of visit based on time spent on the phone with your provider. Not all insurance companies cover these visits. Please check with your medical insurance if this type of visit is covered. You will be responsible for any charges that are not paid by your insurance.      E-visits via Join The Players:  generally incur a $35.00 fee.  Telephone visits:  Time spent on the phone: *charged based on time that is spent on the phone in increments of 10 minutes. Estimated cost:   5-10 mins $30.00   11-20 mins. $59.00   21-30 mins. $85.00   Use Nettlet (secure email communication and access to your chart) to send your primary care provider a message or make an appointment. Ask someone on your Team how to sign up for Join The Players.    For a Price Quote for your services, please call our Consumer Price Line at [phone].    As always, Thank you for trusting us with your health care  needs!    Discharge by PRASHANT COREA

## 2017-10-31 NOTE — NURSING NOTE
"Chief Complaint   Patient presents with     Ear Problem     left ear       Initial /78  Pulse 76  Temp 96.5  F (35.8  C) (Oral)  Wt 285 lb (129.3 kg)  LMP 02/01/2010  SpO2 97%  BMI 47.06 kg/m2 Estimated body mass index is 47.06 kg/(m^2) as calculated from the following:    Height as of 9/20/17: 5' 5.25\" (1.657 m).    Weight as of this encounter: 285 lb (129.3 kg).  Medication Reconciliation: complete       Umm Gutierrez CMA      "

## 2017-10-31 NOTE — PROGRESS NOTES
SUBJECTIVE:   Gladys Singh is a 57 year old female who presents to clinic today for the following health issues:      Concern - LEFT EAR PAIN  Onset: 2 WEEKS AGO    Description:   PAINFUL     Intensity: moderate    Progression of Symptoms:  constant    Accompanying Signs & Symptoms:  NONE    Previous history of similar problem:   HX OF    Precipitating factors:   Worsened by: UNKNOWN    Alleviating factors:  Improved by: SULF-PRED 10-0.23 DROPS    Therapies Tried and outcome: NO RESULTS    Patient used old drops from home.  She has not noted much improvement.  She complains of wetness, hearing loss.  Patient has history of ruptured tympanic membrane without complete healing.    Patient will undergo a cardiac MRI at the end of this week.  She feels she is in atrial fibrillation nearly all the time and note fatigue and frequent chest pain.  She is on metoprolol and plan is to start flecainide if cardiac MRI is normal.      Problem list and histories reviewed & adjusted, as indicated.  Additional history: as documented    Patient Active Problem List   Diagnosis     GERD (gastroesophageal reflux disease)     Pedal edema     Major depressive disorder, recurrent episode, severe (H)     Fatigue     Hypothyroidism     Anxiety     Vegetarian     KHUSHI (obstructive sleep apnea)     Hyperparathyroidism (H)     Iron deficiency     Hyperlipidemia LDL goal <100     Papanicolaou smear of cervix with atypical squamous cells cannot exclude high grade squamous intraepithelial lesion (ASC-H)     Constipation, chronic     Hiatal hernia     Thumb pain     New daily persistent headache     Left knee pain     Morbid obesity due to excess calories (H)     Iliotibial band tendinitis of left side     Essential hypertension     Type 2 diabetes mellitus without complication, without long-term current use of insulin (H)     H/O total knee replacement, bilateral     Paroxysmal supraventricular tachycardia (H)     Paroxysmal atrial  fibrillation (H)     Current use of long term anticoagulation     Past Surgical History:   Procedure Laterality Date     APPENDECTOMY       ARTHROSCOPY KNEE RT/LT       C  DELIVERY ONLY      x 2     C TOTAL KNEE ARTHROPLASTY  2004    bilateral     COLONOSCOPY  , ,     early Crohn's     HC COLP CERVIX/UPPER VAGINA W BX CERVIX  ,     neg     HC ESOPHAGOSCOPY, DIAGNOSTIC  , ,     benign, fungal infection     HC REPAIR OF NASAL SEPTUM       HERNIA REPAIR, UMBILICAL       PE TUBES       TONSILLECTOMY & ADENOIDECTOMY       total knee Bilateral 2004    BRAD Mikael Prasad MD       Social History   Substance Use Topics     Smoking status: Former Smoker     Packs/day: 1.50     Years: 13.00     Quit date: 1994     Smokeless tobacco: Never Used     Alcohol use No     Family History   Problem Relation Age of Onset     DIABETES Mother      CANCER Mother      endometrial     Arrhythmia Mother      DIABETES Father      Prostate Cancer Father      C.A.D. Father      CABGx3, Valve replacement, stents     DIABETES Maternal Grandmother      CEREBROVASCULAR DISEASE Paternal Grandfather      Cancer - colorectal Sister      Neurologic Disorder Daughter      anxiety     Neurologic Disorder Daughter      anxiety, bipolar         Current Outpatient Prescriptions   Medication Sig Dispense Refill     amoxicillin (AMOXIL) 500 MG capsule Take 1 capsule (500 mg) by mouth 3 times daily for 7 days 21 capsule 0     docusate sodium (COLACE) 100 MG capsule TAKE 1 CAPSULE (100 MG) BY MOUTH 2 TIMES DAILY 180 capsule 2     lisinopril (PRINIVIL/ZESTRIL) 10 MG tablet TAKE 1 TABLET (10 MG) BY MOUTH DAILY 90 tablet 2     metoprolol (TOPROL-XL) 100 MG 24 hr tablet Take 1 tablet (100 mg) by mouth daily 90 tablet 3     levothyroxine (SYNTHROID/LEVOTHROID) 75 MCG tablet TAKE 1 TABLET (75 MG) BY MOUTH EVERY DAY 60 tablet 0     ranitidine (ZANTAC) 150 MG tablet TAKE 1 TABLET (150 MG) BY MOUTH 2 TIMES DAILY 60 tablet 5      simvastatin (ZOCOR) 20 MG tablet TAKE 1 TABLET (20 MG) BY MOUTH AT BEDTIME NEED LABS 90 tablet 1     cyanocobalamin (CVS VITAMIN  B12) 1000 MCG TABS TAKE 1 TABLET (1,000 MCG) BY MOUTH DAILY 90 tablet 1     Ferrous Sulfate (CVS SLOW RELEASE IRON) 143 (45 FE) MG TBCR Take 1 tablet by mouth 2 times daily 180 tablet 1     dabigatran ANTICOAGULANT (PRADAXA) 150 MG capsule Take 1 capsule (150 mg) by mouth twice daily. Store in original 's bottle or blister pack; use within 120 days of opening. 60 capsule 5     escitalopram (LEXAPRO) 20 MG tablet Take 1 tablet (20 mg) by mouth daily 90 tablet 1     ASPIRIN NOT PRESCRIBED (INTENTIONAL) Please choose reason not prescribed, below 0 each 0     BusPIRone HCl (BUSPAR PO) Take 30 mg by mouth 2 times daily       Cholecalciferol (VITAMIN D3) 3000 UNITS TABS        exenatide ER (BYDUREON) 2 MG pen Inject 2 mg Subcutaneous every 7 days       POTASSIUM CHLORIDE 20 MEQ CR tablet TAKE 1 TABLET (20 MEQ) BY MOUTH 2 TIMES DAILY 180 tablet 0     vitamin D (ERGOCALCIFEROL) 91579 UNIT capsule TAKE 1 CAPSULE BY MOUTH ONCE WEEKLY. 8 capsule 1     furosemide (LASIX) 40 MG tablet TAKE 1 TABLET (40 MG) BY MOUTH DAILY 90 tablet 0     blood glucose monitoring (WiztangoET) lancets TEST ONCE DAILY OR AS NEEDED *DISPENSE WHATEVER IS COVERED BY INS* 1 Box 3     calcium carbonate (OSCAL 500) 1250 (500 CA) MG TABS tablet TAKE 1 TABLET BY MOUTH. THREE TIMES DAILY. 270 tablet 0     Multiple Vitamins-Minerals (CVS SPECTRAVITE ULTRA WOMEN) TABS TAKE 1 TABLET BY MOUTH DAILY 90 tablet 3     omeprazole (PRILOSEC) 20 MG CR capsule Take 1 capsule (20 mg) by mouth 2 times daily 180 capsule 3     blood glucose monitoring (NO BRAND SPECIFIED) meter device kit Use to test blood sugar one times daily or as directed. 1 kit 0     blood glucose monitoring (NO BRAND SPECIFIED) test strip Use to test blood sugars one times daily or as directed 100 strip 3     insulin pen needle (B-D U/F) 31G X 5 MM Use once  daily or as directed. 100 each prn     order for DME Test strips for pt's glucometer, brand as covered by insurance. Test twice daily or PRN. 1 Box prn     LORazepam (ATIVAN) 1 MG tablet Take 1 mg by mouth At Bedtime        modafinil (PROVIGIL) 200 MG tablet Take 0.5 tablets by mouth daily.       AMBIEN 10 MG OR TABS 1 TABLET DAILY AT BEDTIME       Allergies   Allergen Reactions     Augmentin GI Disturbance     Victoza Other (See Comments)     Abdominal pain     BP Readings from Last 3 Encounters:   10/31/17 120/78   10/23/17 (!) 120/5   10/05/17 130/72    Wt Readings from Last 3 Encounters:   10/31/17 285 lb (129.3 kg)   09/20/17 286 lb (129.7 kg)   09/08/17 285 lb (129.3 kg)                  Labs reviewed in EPIC        [facility]       Reviewed and updated as needed this visit by Provider         ROS:  Constitutional, HEENT, cardiovascular, pulmonary, gi and gu systems are negative, except as otherwise noted.      OBJECTIVE:   /78  Pulse 76  Temp 96.5  F (35.8  C) (Oral)  Wt 285 lb (129.3 kg)  LMP 02/01/2010  SpO2 97%  BMI 47.06 kg/m2  Body mass index is 47.06 kg/(m^2).  GENERAL: healthy, alert and no distress  EYES: Eyes grossly normal to inspection, PERRL and conjunctivae and sclerae normal  HENT: normal cephalic/atraumatic, right ear: normal: no effusions, no erythema, normal landmarks, left ear: erythematous, bulging membrane and perforation, nose and mouth without ulcers or lesions, oropharynx clear and oral mucous membranes moist  NECK: no adenopathy, no asymmetry, masses, or scars and thyroid normal to palpation  RESP: lungs clear to auscultation - no rales, rhonchi or wheezes  CV: irregularly irregular rhythm, normal S1 S2, no S3 or S4, no murmur, click or rub, peripheral pulses strong and no peripheral edema  MS: no gross musculoskeletal defects noted, no edema    Diagnostic Test Results:  none     ASSESSMENT/PLAN:     1.  Recurrent acute suppurative otitis media without spontaneous rupture of left tympanic membrane    - amoxicillin (AMOXIL) 500 MG capsule; Take 1 capsule (500 mg) by mouth 3 times daily for 7 days  Dispense: 21 capsule; Refill: 0    2. Paroxysmal atrial fibrillation (H)  Patient to continue to follow-up with cardiology.    3. Type 2 diabetes mellitus without complication, without long-term current use of insulin (H)  [facility] and most recent HgbA1C has been scanned in.  Patient plans to see me in the future for diabetes management.      FUTURE APPOINTMENTS:       - Follow-up visit in 5 months.    JACKELINE Hare [facility]

## 2017-11-02 ENCOUNTER — TELEPHONE (OUTPATIENT)
Dept: INTERNAL MEDICINE | Facility: CLINIC | Age: 57
End: 2017-11-02

## 2017-11-02 DIAGNOSIS — H66.005 RECURRENT ACUTE SUPPURATIVE OTITIS MEDIA WITHOUT SPONTANEOUS RUPTURE OF LEFT TYMPANIC MEMBRANE: Primary | ICD-10-CM

## 2017-11-02 RX ORDER — OFLOXACIN 3 MG/ML
10 SOLUTION AURICULAR (OTIC) 2 TIMES DAILY
Qty: 10 ML | Refills: 0 | Status: SHIPPED | OUTPATIENT
Start: 2017-11-02 | End: 2017-11-09

## 2017-11-02 NOTE — TELEPHONE ENCOUNTER
Patient called stating she saw Vania Oneil Almendarez two days ago for ruptured ear drum, was given amoxicillin and is helping a little but not enough, ear is draining. She was advised to call back if she thought she needed ear drops and that is what she is requesting now.    Okay to leave message on patients voicemail.  Please send to [facility] if approved.    Sofía Carter, CMA

## 2017-11-02 NOTE — TELEPHONE ENCOUNTER
Writer called pt to inform her of medication  pt stated clear understanding       Umm Gutierrez CMA

## 2017-11-03 ENCOUNTER — HOSPITAL ENCOUNTER (OUTPATIENT)
Dept: MRI IMAGING | Facility: CLINIC | Age: 57
Discharge: HOME | End: 2017-11-03
Attending: INTERNAL MEDICINE | Admitting: INTERNAL MEDICINE
Payer: COMMERCIAL

## 2017-11-03 VITALS — DIASTOLIC BLOOD PRESSURE: 70 MMHG | SYSTOLIC BLOOD PRESSURE: 118 MMHG | HEART RATE: 57 BPM | OXYGEN SATURATION: 93 %

## 2017-11-03 DIAGNOSIS — R06.02 SOB (SHORTNESS OF BREATH): ICD-10-CM

## 2017-11-03 PROCEDURE — 93018 CV STRESS TEST I&R ONLY: CPT | Performed by: INTERNAL MEDICINE

## 2017-11-03 PROCEDURE — 93016 CV STRESS TEST SUPVJ ONLY: CPT | Performed by: INTERNAL MEDICINE

## 2017-11-03 PROCEDURE — 25000128 H RX IP 250 OP 636: Performed by: INTERNAL MEDICINE

## 2017-11-03 PROCEDURE — A9585 GADOBUTROL INJECTION: HCPCS | Performed by: INTERNAL MEDICINE

## 2017-11-03 PROCEDURE — 93005 ELECTROCARDIOGRAM TRACING: CPT

## 2017-11-03 PROCEDURE — 93010 ELECTROCARDIOGRAM REPORT: CPT | Mod: 59 | Performed by: INTERNAL MEDICINE

## 2017-11-03 PROCEDURE — 93017 CV STRESS TEST TRACING ONLY: CPT

## 2017-11-03 PROCEDURE — 75563 CARD MRI W/STRESS IMG & DYE: CPT

## 2017-11-03 PROCEDURE — 75563 CARD MRI W/STRESS IMG & DYE: CPT | Mod: 26 | Performed by: INTERNAL MEDICINE

## 2017-11-03 RX ORDER — ACYCLOVIR 200 MG/1
0-1 CAPSULE ORAL
Status: DISCONTINUED | OUTPATIENT
Start: 2017-11-03 | End: 2017-11-04 | Stop reason: HOSPADM

## 2017-11-03 RX ORDER — DIAZEPAM 5 MG
5 TABLET ORAL EVERY 30 MIN PRN
Status: DISCONTINUED | OUTPATIENT
Start: 2017-11-03 | End: 2017-11-04 | Stop reason: HOSPADM

## 2017-11-03 RX ORDER — GADOBUTROL 604.72 MG/ML
10 INJECTION INTRAVENOUS ONCE
Status: COMPLETED | OUTPATIENT
Start: 2017-11-03 | End: 2017-11-03

## 2017-11-03 RX ORDER — REGADENOSON 0.08 MG/ML
0.4 INJECTION, SOLUTION INTRAVENOUS ONCE
Status: COMPLETED | OUTPATIENT
Start: 2017-11-03 | End: 2017-11-03

## 2017-11-03 RX ORDER — ALBUTEROL SULFATE 90 UG/1
2 AEROSOL, METERED RESPIRATORY (INHALATION) EVERY 5 MIN PRN
Status: DISCONTINUED | OUTPATIENT
Start: 2017-11-03 | End: 2017-11-04 | Stop reason: HOSPADM

## 2017-11-03 RX ORDER — AMINOPHYLLINE 25 MG/ML
100 INJECTION, SOLUTION INTRAVENOUS ONCE
Status: COMPLETED | OUTPATIENT
Start: 2017-11-03 | End: 2017-11-03

## 2017-11-03 RX ADMIN — AMINOPHYLLINE 100 MG: 25 INJECTION, SOLUTION INTRAVENOUS at 09:29

## 2017-11-03 RX ADMIN — GADOBUTROL 10 ML: 604.72 INJECTION INTRAVENOUS at 09:27

## 2017-11-03 RX ADMIN — REGADENOSON 0.4 MG: 0.08 INJECTION, SOLUTION INTRAVENOUS at 09:20

## 2017-11-03 NOTE — PROGRESS NOTES
Pt here for cardiac MRI with stress. Safety checklist, allergies, and meds all reviewed. Test explained and all questions answered. Lungs clear. Denied caffeine intake. Lexiscan 0.4mg given over 15 seconds followed by 5cc NS flush. Aminophylline 100mg post Amaya injection per protocol. Pt tolerated scan, meds, and contrast well; stable throughout. Pre and post EKG completed. Pt monitored post MRI and escorted back to [facility] waiting room.

## 2017-11-06 LAB
INTERPRETATION ECG - MUSE: NORMAL
INTERPRETATION ECG - MUSE: NORMAL

## 2017-11-07 ENCOUNTER — TELEPHONE (OUTPATIENT)
Dept: CARDIOLOGY | Facility: CLINIC | Age: 57
End: 2017-11-07

## 2017-11-07 DIAGNOSIS — E03.9 ACQUIRED HYPOTHYROIDISM: ICD-10-CM

## 2017-11-07 DIAGNOSIS — E55.9 VITAMIN D DEFICIENCY: ICD-10-CM

## 2017-11-07 DIAGNOSIS — E21.3 HYPERPARATHYROIDISM (H): ICD-10-CM

## 2017-11-07 DIAGNOSIS — R60.0 PEDAL EDEMA: ICD-10-CM

## 2017-11-07 DIAGNOSIS — I48.91 ATRIAL FIBRILLATION (H): Primary | ICD-10-CM

## 2017-11-07 DIAGNOSIS — R10.13 ABDOMINAL PAIN, EPIGASTRIC: ICD-10-CM

## 2017-11-07 NOTE — TELEPHONE ENCOUNTER
"Patient called in to clinic to inquire about cardiac MRI that was completed on 11/03/17.  Upon further review, the MRI results state \"In Process\".  I called MRI reading lab at the [facility] and was informed that there is an IT issue with the result being released in to Epic (a ticket has been placed by the reading lab).  Staff informed me verbally that NO ischemia IS PRESENT ON SCAN.  Please call patient with results and treatment plan for atrial fibrillation; patient states that you may leave a detailed message if you receive her voicemail.  Kelsey Fajardo, RMSUSANNAH    "

## 2017-11-09 RX ORDER — FLECAINIDE ACETATE 150 MG/1
150 TABLET ORAL DAILY
Qty: 90 TABLET | Refills: 3 | Status: CANCELLED | OUTPATIENT
Start: 2017-11-09

## 2017-11-09 RX ORDER — FUROSEMIDE 40 MG
TABLET ORAL
Qty: 90 TABLET | Refills: 2 | Status: SHIPPED | OUTPATIENT
Start: 2017-11-09

## 2017-11-09 NOTE — TELEPHONE ENCOUNTER
Prescription approved per [facility] Refill Protocol.    Fariba Pimentel RN  [facility]

## 2017-11-09 NOTE — TELEPHONE ENCOUNTER
"Reviewed pt's stress MRI. Results were normal, therefore according to Dr. Pierre's last OV notes, pt to \"initiate flecanide 75mg twice daily and follow up with an exercise stress test one week later\"  Message sent to Dr. Pierre to review prior to starting.   Pt aware and will call pt back once Dr. Pierre replies.  Jeannette Diaz RN      "

## 2017-11-10 RX ORDER — ERGOCALCIFEROL 1.25 MG/1
CAPSULE, LIQUID FILLED ORAL
Qty: 8 CAPSULE | Refills: 0 | Status: SHIPPED | OUTPATIENT
Start: 2017-11-10 | End: 2018-01-21

## 2017-11-10 NOTE — TELEPHONE ENCOUNTER
Per pt, states she stopped taking modafinil last Wednesday and she was symptom free for 3 days but symptoms are back but minimal.  She is requesting another monitor before she starts flecainide.  Per Dr. Pierre, pt ok to have 2 week zio prior to starting flecainide.    Orders entered and message left with pt.    Jeannette Diaz RN

## 2017-11-10 NOTE — TELEPHONE ENCOUNTER
Spoke to Patient, reviewed message below. Patient is scheduled to come in for Zio on 11/13/17.    Bita Kaiser CMA.

## 2017-11-13 ENCOUNTER — ALLIED HEALTH/NURSE VISIT (OUTPATIENT)
Dept: CARDIOLOGY | Facility: CLINIC | Age: 57
End: 2017-11-13
Payer: COMMERCIAL

## 2017-11-13 DIAGNOSIS — I48.91 ATRIAL FIBRILLATION (H): ICD-10-CM

## 2017-11-13 PROCEDURE — 0296T ZIO PATCH HOLTER: CPT | Performed by: INTERNAL MEDICINE

## 2017-11-13 PROCEDURE — 0298T ZZC EXT ECG > 48HR TO 21 DAY REVIEW AND INTERPRETATN: CPT | Performed by: INTERNAL MEDICINE

## 2017-11-13 NOTE — MR AVS SNAPSHOT
After Visit Summary   11/13/2017    Gladys Singh    MRN: [id number]           Patient Information     Date Of Birth          1960        Visit Information        Provider Department      11/13/2017 10:00 AM FK ANCILLARY CARDS [facility]        Today's Diagnoses     Atrial fibrillation (H)           Follow-ups after your visit        Your next 10 appointments already scheduled     Nov 22, 2017  2:30 PM CST   CONSULT with Octavia Solis MD   [facility]    [address] [facility] [address] [phone]            Dec 11, 2017  4:20 PM CST   Return Visit with Ira Pierre MD   [facility]    [address] [address] [phone]              Who to contact     If you have questions or need follow up information about today's clinic visit or your schedule please contact [facility] directly at [phone].  Normal or non-critical lab and imaging results will be communicated to you by [email or web address], letter or phone within 4 business days after the clinic has received the results. If you do not hear from us within 7 days, please contact the clinic through [email or web address] or phone. If you have a critical or abnormal lab result, we will notify you by phone as soon as possible.  Submit refill requests through [employer] or call your pharmacy and they will forward the refill request to us. Please allow 3 business days for your refill to be completed.          Additional Information About Your Visit        [email or web address] Information     [employer] gives you secure access to your electronic health record. If you see a primary care provider, you can also send messages to your care team and make appointments. If you have questions, please call your primary care clinic.  If you  do not have a primary care provider, please call [phone] and they will assist you.        Care EveryWhere ID     This is your Care EveryWhere ID. This could be used by other organizations to access your [place] medical records  [id number]        Your Vitals Were     Last Period                   02/01/2010            Blood Pressure from Last 3 Encounters:   11/03/17 118/70   10/31/17 120/78   10/23/17 (!) 120/5    Weight from Last 3 Encounters:   10/31/17 129.3 kg (285 lb)   09/20/17 129.7 kg (286 lb)   09/08/17 129.3 kg (285 lb)              We Performed the Following     Ziopatch Holter Monitor - Adult        Primary Care Provider Office Phone # Fax #    Vania MercedesJACKELINE [facility] [phone]       [address] [facility] [address]        Equal Access to Services     [facility] Center[facility] : Hadii aad ku hadasho Soomaali, waaxda luqadaha, qaybta kaalmada adeegyada, waxay idiin hayaan adeseamus kharash rocn . So [facility] [phone].    ATENCIÓN: Si habla español, tiene a eason disposición servicios gratuitos de asistencia lingüística. Anshulsimran al [phone].    We comply with applicable federal civil rights laws and [place] laws. We do not discriminate on the basis of race, color, national origin, age, disability, sex, sexual orientation, or gender identity.            Thank you!     Thank you for choosing [facility] AT [facility]  for your care. Our goal is always to provide you with excellent care. Hearing back from our patients is one way we can continue to improve our services. Please take a few minutes to complete the written survey that you may receive in the mail after your visit with us. Thank you!             Your Updated Medication List - Protect others around you: Learn how to safely use, store and throw away your medicines at www.disposemymeds.org.          This list is accurate as of: 11/13/17 10:42 AM.  Always use your most recent med list.                    Brand Name Dispense Instructions for use Diagnosis    AMBIEN 10 MG tablet   Generic drug:  zolpidem      1 TABLET DAILY AT BEDTIME        ASPIRIN NOT PRESCRIBED    INTENTIONAL    0 each    Please choose reason not prescribed, below    Paroxysmal atrial fibrillation (H)       blood glucose monitoring lancets     1 Box    TEST ONCE DAILY OR AS NEEDED *DISPENSE WHATEVER IS COVERED BY INS*    Type 2 diabetes mellitus without complication, without long-term current use of insulin (H)       blood glucose monitoring meter device kit    no brand specified    1 kit    Use to test blood sugar one times daily or as directed.    Type 2 diabetes mellitus without complication, without long-term current use of insulin (H)       blood glucose monitoring test strip    no brand specified    100 strip    Use to test blood sugars one times daily or as directed    Type 2 diabetes mellitus without complication, without long-term current use of insulin (H)       BUSPAR PO      Take 30 mg by mouth 2 times daily        BYDUREON 2 MG pen   Generic drug:  exenatide ER      Inject 2 mg Subcutaneous every 7 days        calcium carbonate 1250 (500 CA) MG Tabs tablet    OSCAL 500    270 tablet    TAKE 1 TABLET BY MOUTH. THREE TIMES DAILY.    Elevated PTHrP level (H)       [facility] SPECTRAVITE ULTRA WOMEN Tabs     90 tablet    TAKE 1 TABLET BY MOUTH DAILY    Constipation, chronic       cyanocobalamin 1000 MCG Tabs    [facility] vitamin  B12    90 tablet    TAKE 1 TABLET (1,000 MCG) BY MOUTH DAILY    Vitamin B12 deficiency (non anemic)       dabigatran ANTICOAGULANT 150 MG capsule    PRADAXA    60 capsule    Take 1 capsule (150 mg) by mouth twice daily. Store in original 's bottle or blister pack; use within 120 days of opening.    Paroxysmal atrial fibrillation (H)       docusate sodium 100 MG capsule    COLACE    180 capsule    TAKE 1 CAPSULE (100 MG) BY MOUTH 2 TIMES DAILY    Constipation, unspecified constipation type        escitalopram 20 MG tablet    LEXAPRO    90 tablet    Take 1 tablet (20 mg) by mouth daily    Paroxysmal atrial fibrillation (H)       Ferrous Sulfate 143 (45 FE) MG Tbcr    [facility]S SLOW RELEASE IRON    180 tablet    Take 1 tablet by mouth 2 times daily    Iron deficiency       furosemide 40 MG tablet    LASIX    90 tablet    TAKE 1 TABLET (40 MG) BY MOUTH DAILY    Pedal edema       insulin pen needle 31G X 5 MM    B-D U/F    100 each    Use once daily or as directed.    Type 2 diabetes mellitus without complication (H)       KLOR-CON 20 MEQ CR tablet   Generic drug:  potassium chloride SA     180 tablet    TAKE 1 TABLET (20 MEQ) BY MOUTH 2 TIMES DAILY    Diuresis       levothyroxine 75 MCG tablet    SYNTHROID/LEVOTHROID    60 tablet    TAKE 1 TABLET (75 MG) BY MOUTH EVERY DAY    Acquired hypothyroidism, Vitamin D deficiency, Hyperparathyroidism (H), Abdominal pain, epigastric       lisinopril 10 MG tablet    PRINIVIL/ZESTRIL    90 tablet    TAKE 1 TABLET (10 MG) BY MOUTH DAILY    Type 2 diabetes mellitus without complication, without long-term current use of insulin (H), Essential hypertension with goal blood pressure less than 140/90       LORazepam 1 MG tablet    ATIVAN     Take 1 mg by mouth At Bedtime        metoprolol 100 MG 24 hr tablet    TOPROL-XL    90 tablet    Take 1 tablet (100 mg) by mouth daily    Paroxysmal atrial fibrillation (H), Chest pain, unspecified type       omeprazole 20 MG CR capsule    priLOSEC    180 capsule    Take 1 capsule (20 mg) by mouth 2 times daily    Hiatal hernia       order for DME     1 Box    Test strips for pt's glucometer, brand as covered by insurance. Test twice daily or PRN.    Type 2 diabetes mellitus without complication (H)       PROVIGIL 200 MG tablet   Generic drug:  modafinil      Take 0.5 tablets by mouth daily.        ranitidine 150 MG tablet    ZANTAC    60 tablet    TAKE 1 TABLET (150 MG) BY MOUTH 2 TIMES DAILY    Gastroesophageal reflux disease, esophagitis  presence not specified       simvastatin 20 MG tablet    ZOCOR    90 tablet    TAKE 1 TABLET (20 MG) BY MOUTH AT BEDTIME NEED LABS    Hyperlipidemia LDL goal <100       vitamin D 57844 UNIT capsule    ERGOCALCIFEROL    8 capsule    TAKE 1 CAPSULE BY MOUTH ONCE WEEKLY.    Vitamin D deficiency, Hyperparathyroidism (H), Acquired hypothyroidism, Abdominal pain, epigastric       Vitamin D3 3000 UNITS Tabs

## 2017-11-13 NOTE — NURSING NOTE
2 week ZioXT applied to patient today. Instructions on use and removal given and mail back instructions discussed. All questions answered. Consent form signed. Device registered. Form faxed to [facility].  Device number: [id number].    Bita Kaiser CMA.

## 2017-11-16 NOTE — PROGRESS NOTES
INTERNAL MEDICINE  Medical Weight Loss - Initial Evaluation    Primary Care Physician: Vania Monroe    Chief Complaint:   Chief Complaint   Patient presents with     Weight Problem     Consult      Wt Readings from Last 5 Encounters:   11/22/17 128.4 kg (283 lb)   10/31/17 129.3 kg (285 lb)   09/20/17 129.7 kg (286 lb)   09/08/17 129.3 kg (285 lb)   09/01/17 128.5 kg (283 lb 6.4 oz)       HISTORY OF PRESENT ILLNESS (HPI):    Patient is 57 year old year old female who is here for medical weight loss initial evaluation.       Atrial Fibrillation - Dr. Pierre her cardiologist recommend she have a weigh loss consult as he is requesting she lose weight prior to having an ablation with atrial fibrillation. First detected in September. She has daily symptoms: flutter and chest pain. He recommended she try two different medications and try to lose weight. At nighttime she feels like she cannot breathe.     Vegan diet - Patient is Vegan and knows she has a high carbohydrate diet. She had a knee replacement in 2015, she babies her knees and is fairly sedentary. In the past she lost 50 pounds with a weight loss program at the [facility] and then regained the weight. She was Vegan at the time. She eats [employer] meals twice a week. She has been a vegetarian since 13 years old and naturally transitioned to Vegan (ethical reasons). Once and awhile she has food that is not Vegan and it makes her sick.     Typical Daily Diet:  AM: sandwich, vegan cheese  LUNCH: banana, peanut butter, soy yogurt  PM: sandwich, lettuce, soup  SNACKS: crackers, vegan cheese  LATE NIGHT: apple, banana, sour cherries, vegan cheese, soy yogurt, crackers    Nighttime eating - Sometimes she wakes up overnight to eat. There are times when she is not really awake but gets up to eat. She keeps food next to her bed. This does not happen every night. She does not necessarily feel her blood glucose is low. She always snacks and watches TV before bed in her  room.     Diabetes - Bydureon has not helped her with weight loss. Jocelyn helped with weight loss and kept her weight stable, but towards the end she started to have abdominal pain. Some of the diabetic medications made her sick. She was seen at the [facility] and her last A1C was 6.2 in September. Prior to this her A1C was in the 5's.       She desires to lose weight to Improve overall health, help with atrial fibrillation.    Highest adult weight was 286 lbs.   Patient feels her goal weight is 220 lbs.     Previous weight loss efforts: OTHER: Weight loss program at the [facility]    Exercise Habits: Patient is not currently exercising.  Previous exercise included  Walking and swimming.    Patient has not had weight loss surgery.  Patient has not considered weight loss surgery.   Patient has considered weight loss medication.  Patient has not been on weight loss medication.       OBESITY RELATED COMORBIDITIES:   diabetes mellitus , hypertension, hyperlipidemia, edema, gastroesophageal reflux disease, sleep apnea and depression    PAST SURGICAL HISTORY:   Past Surgical History:   Procedure Laterality Date     APPENDECTOMY       ARTHROSCOPY KNEE RT/LT       C  DELIVERY ONLY      x 2     C TOTAL KNEE ARTHROPLASTY  2004    bilateral     COLONOSCOPY  , ,     early Crohn's     HC COLP CERVIX/UPPER VAGINA W BX CERVIX  ,     neg     HC ESOPHAGOSCOPY, DIAGNOSTIC  , ,     benign, fungal infection     HC REPAIR OF NASAL SEPTUM       HERNIA REPAIR, UMBILICAL       PE TUBES       TONSILLECTOMY & ADENOIDECTOMY       total knee Bilateral     BRAD Mikael Prasad MD       PAST MEDICAL HISTORY:  Past Medical History:   Diagnosis Date     Anxiety     sees Dr. Chance     Arthritis involving multiple sites     knees, hip     ASCUS (atypical squamous cells of undetermined significance) on Pap smear 3/13/14     Benign neoplasm of stomach      BMI 40.0-44.9, adult (H)     sees Dr. Quan      Cervical high risk human papillomavirus (HPV) DNA test positive 2010     Colon polyp 2005     Constipation, chronic      Crohn's disease (H) 2012     Depression     sees Dr. Elio Chance     Diverticulosis 11/1/2010     Fundic gland polyps of stomach, benign 11/1/2010     H/O colposcopy with cervical biopsy 9/13/13    no lesions seen.  repeat pap due in 6 months     Hiatal hernia 11/1/2010     High risk HPV infection 8/21/13    NIL pap/+ HR HPV (16)     Hyperlipidemia LDL goal <100      Hyperparathyroidism, unspecified     sees Dr Morris, Endocrine every 6 mos     Hypothyroidism     sees Dr. Mroris -endo      Ileitis 9/19/12    ? crohn's     Iliotibial band friction syndrome of both knees      Ingrown toenail      Iron deficiency     on iron bid. had egd and colon per Dr. Sheppard.     KHUSHI (obstructive sleep apnea)     CPAP     Papanicolaou smear of cervix with atypical squamous cells of undetermined significance (ASC-US) 2011     Pedal edema     on lasix and potasium     Plantar fasciitis     resolved     Type II or unspecified type diabetes mellitus without mention of complication, not stated as uncontrolled     resolved     Vitamin D deficiency     sees Dr Morris, Endocrine every 6 mos       MEDICATIONS:   Current Outpatient Prescriptions   Medication Sig Dispense Refill     vitamin D (ERGOCALCIFEROL) 41939 UNIT capsule TAKE 1 CAPSULE BY MOUTH ONCE WEEKLY. 8 capsule 0     furosemide (LASIX) 40 MG tablet TAKE 1 TABLET (40 MG) BY MOUTH DAILY 90 tablet 2     docusate sodium (COLACE) 100 MG capsule TAKE 1 CAPSULE (100 MG) BY MOUTH 2 TIMES DAILY 180 capsule 2     lisinopril (PRINIVIL/ZESTRIL) 10 MG tablet TAKE 1 TABLET (10 MG) BY MOUTH DAILY 90 tablet 2     metoprolol (TOPROL-XL) 100 MG 24 hr tablet Take 1 tablet (100 mg) by mouth daily 90 tablet 3     levothyroxine (SYNTHROID/LEVOTHROID) 75 MCG tablet TAKE 1 TABLET (75 MG) BY MOUTH EVERY DAY 60 tablet 0     ranitidine (ZANTAC) 150 MG tablet TAKE 1 TABLET (150 MG) BY MOUTH  2 TIMES DAILY 60 tablet 5     simvastatin (ZOCOR) 20 MG tablet TAKE 1 TABLET (20 MG) BY MOUTH AT BEDTIME NEED LABS 90 tablet 1     cyanocobalamin (CVS VITAMIN  B12) 1000 MCG TABS TAKE 1 TABLET (1,000 MCG) BY MOUTH DAILY 90 tablet 1     Ferrous Sulfate (CVS SLOW RELEASE IRON) 143 (45 FE) MG TBCR Take 1 tablet by mouth 2 times daily 180 tablet 1     dabigatran ANTICOAGULANT (PRADAXA) 150 MG capsule Take 1 capsule (150 mg) by mouth twice daily. Store in original 's bottle or blister pack; use within 120 days of opening. 60 capsule 5     escitalopram (LEXAPRO) 20 MG tablet Take 1 tablet (20 mg) by mouth daily 90 tablet 1     ASPIRIN NOT PRESCRIBED (INTENTIONAL) Please choose reason not prescribed, below 0 each 0     BusPIRone HCl (BUSPAR PO) Take 30 mg by mouth 2 times daily       Cholecalciferol (VITAMIN D3) 3000 UNITS TABS        exenatide ER (BYDUREON) 2 MG pen Inject 2 mg Subcutaneous every 7 days       POTASSIUM CHLORIDE 20 MEQ CR tablet TAKE 1 TABLET (20 MEQ) BY MOUTH 2 TIMES DAILY 180 tablet 0     blood glucose monitoring ([employer]) lancets TEST ONCE DAILY OR AS NEEDED *DISPENSE WHATEVER IS COVERED BY INS* 1 Box 3     calcium carbonate (OSCAL 500) 1250 (500 CA) MG TABS tablet TAKE 1 TABLET BY MOUTH. THREE TIMES DAILY. 270 tablet 0     Multiple Vitamins-Minerals (CVS SPECTRAVITE ULTRA WOMEN) TABS TAKE 1 TABLET BY MOUTH DAILY 90 tablet 3     omeprazole (PRILOSEC) 20 MG CR capsule Take 1 capsule (20 mg) by mouth 2 times daily 180 capsule 3     blood glucose monitoring (NO BRAND SPECIFIED) meter device kit Use to test blood sugar one times daily or as directed. 1 kit 0     blood glucose monitoring (NO BRAND SPECIFIED) test strip Use to test blood sugars one times daily or as directed 100 strip 3     insulin pen needle (B-D U/F) 31G X 5 MM Use once daily or as directed. 100 each prn     order for DME Test strips for pt's glucometer, brand as covered by insurance. Test twice daily or PRN. 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     Social History Main Topics     Smoking status: Former Smoker     Packs/day: 1.50     Years: 13.00     Quit date: 1/1/1994     Smokeless tobacco: Never Used     Alcohol use No     Drug use: No     Sexual activity: No     Other Topics Concern      Service No     Blood Transfusions No     Caffeine Concern No     Occupational Exposure No     Hobby Hazards No     Sleep Concern Yes     Stress Concern No     Weight Concern No     Special Diet Yes     vegan     Back Care No     Exercise Yes     walks, swims     Bike Helmet Not Asked     na     Seat Belt Yes     Self-Exams No     Social History Narrative       FAMILY HISTORY:   Family History   Problem Relation Age of Onset     DIABETES Mother      CANCER Mother      endometrial     Arrhythmia Mother      DIABETES Father      Prostate Cancer Father      C.A.D. Father      CABGx3, Valve replacement, stents     DIABETES Maternal Grandmother      CEREBROVASCULAR DISEASE Paternal Grandfather      Cancer - colorectal Sister      Neurologic Disorder Daughter      anxiety     Neurologic Disorder Daughter      anxiety, bipolar       REVIEW OF SYSTEMS:   ROS: 10 point ROS neg other than the symptoms noted above in the HPI.     This document serves as a record of the services and decisions personally performed and made by Octavia Solis MD. It was created on his/her behalf by Carli Rees, trained medical scribe. The creation of this document is based the provider's statements to the  medical scribes.    Scribe Carli Rees 2:59 PM, November 22, 2017    PHYSICAL EXAMINATION:    VITALS: /86  Pulse 81  Temp 97  F (36.1  C) (Oral)  Wt 128.4 kg (283 lb)  LMP 02/01/2010  SpO2 95%  BMI 46.73 kg/m2  GENERAL: Patient is a 57 year old year old female is in no acute distress.  Patient is alert and orientated x 4, pleasant and cooperative with exam. Mood and affect are appropriate.  HEENT:  Head is normocephalic, nontraumatic. Oropharynx crowded.  NECK: is supple without lymphadenopathy, thyroidmegaly, or mass.  Trachea midline.   CARDIOVASCULAR:  Irregularly irregular rate and rhythm without murmurs, rubs, or gallops.  RESPIRATORY:  Lungs are clear to auscultation bilaterally, respiratory effort is normal.   GASTROINTESTINAL:  Abdomen is obese, soft, nontender, without obvious organomegaly or masses.  Positive bowel sounds throughout. No bruits.  LOWER EXTREMITIES:  No edema, cyanosis, ulceration, or chronic venous stasis noted bilaterally.  NEUROLOGIC: Gait appears normal.  SKIN:  No intertiginous irritation or rash.    1+ posterior tibial pulses bilateral    PSYCH: mentation appears normal, affect normal/bright    LAB RESULTS:   Hospital Outpatient Visit on 11/03/2017   Component Date Value Ref Range Status     Interpretation ECG 11/03/2017 Click View Image link to view waveform and result   Final     Interpretation ECG 11/03/2017 Click View Image link to view waveform and result   Final       ASSESSMENT/PLAN:    1. Morbid obesity due to excess calories (H)  She is sedentary and follows a high carb Vegan diet. Pt will start eating by the Plate Method and try to add some form of protein to every meal. She will schedule with diabetic focused PT. She will look into scheduling with the nutritionist for group intervention. She will get set up with a health  to help hold her accountable and set goals. Weight loss medications are not indicated for her. Follow up with me or Vania Monroe  in one month.  - [facility], AND CHIROPRACTIC REFERRAL  - DIABETES EDUCATOR REFERRAL  - HEALTH  REFERRAL    2. Severe episode of recurrent major depressive disorder, without psychotic features (H)  Stable. Continues on Lexapro 20 mg daily, Buspar 30 mg bid, Ativan 1 mg daily, and Ambien 10 mg daily.    3. Anxiety  See above.    4. Type 2 diabetes mellitus without complication, without long-term current use of insulin (H)  Controlled. Continues on Bydureon 2 mg weekly.   - [facility], AND CHIROPRACTIC REFERRAL  - DIABETES EDUCATOR REFERRAL  - HEALTH  REFERRAL  Lab Results   Component Value Date    A1C 6.0 12/06/2016    A1C 5.8 08/09/2016    A1C 6.4 04/07/2016    A1C 5.9 06/15/2015    A1C 5.9 02/27/2015       5. Essential hypertension  Controlled. Continues on lasix 40 mg, lisinopril 10 mg, and metoprolol 100 mg daily.    BP Readings from Last 3 Encounters:   11/22/17 122/86   11/03/17 118/70   10/31/17 120/78       6. Acquired hypothyroidism  Stable. Continues on levothyroxine 75 mcg daily.     7. Current use of long term anticoagulation  Stable. Continues on Pradaxa 150 mg bid.     8. Chronic pain of both knees    - [facility], AND CHIROPRACTIC REFERRAL    9. Physical deconditioning    - [facility], AND CHIROPRACTIC REFERRAL  - HEALTH  REFERRAL       Patient will begin medical weight loss program. We discussed the need for lifelong dietary changes and a regular exercise program in order to lose weight and in order to maintain weight loss long-term. We discussed a realistic weight goal which is  5-10% of the patients initial weight which has been shown to reduce medical risks and improve overall health. Additional goals will be make thereafter.      Referred to bariatric dietitian.     Referred to health - Melissa Villalpando.     Referred for an exercise evaluation through physical therapy- diabetic focused exercise.     All of patients questions about the weight loss program were answered  "fully.       Patient Instructions     Start eating by the \"Plate Method\" at meals.  Try to add some form of protein to every meal- plant proteins are fine.    Schedule with diabetic focused physical therapy to help you get back into activity.     Call to see if the diabetic educator has any group sessions.    One of the health coaches (Meilssa or Irasema) will call you to help you set goals and hold you accountable.     Follow up with me or Vania Monroe monthly.      [facility] (formerly [facility]3]    If you have any questions regarding to your visit please contact your care team:     Team Pink:   Clinic Hours Telephone Number   Internal Medicine:  Dr. Octavia Monroe, NP       7am-7pm  Monday - Thursday   7am-5pm  Fridays  [phone]  (Appointment scheduling available 24/7)    Questions about your visit?  Team Line  [phone]   [facility] [place] [facility] 11am-9pm Monday-Friday Saturday-Sunday- 9am-5pm   [place] - 5pm-9pm Monday-Friday Saturday-Sunday- 9am-5pm  [phone] - [place]   [phone] - [place]       What options do I have for visits at the clinic other than the traditional office visit?  To expand how we care for you, many of our providers are utilizing electronic visits (e-visits) and telephone visits, when medically appropriate, for interactions with their patients rather than a visit in the clinic.   We also offer nurse visits for many medical concerns. Just like any other service, we will bill your insurance company for this type of visit based on time spent on the phone with your provider. Not all insurance companies cover these visits. Please check with your medical insurance if this type of visit is covered. You will be responsible for any charges that are not paid by your insurance.      E-visits via [employer]:  generally incur a $35.00 fee.  Telephone visits:  Time spent on the phone: *charged based on time that is spent on " the phone in increments of 10 minutes. Estimated cost:   5-10 mins $30.00   11-20 mins. $59.00   21-30 mins. $85.00   Use [employer] (secure email communication and access to your chart) to send your primary care provider a message or make an appointment. Ask someone on your Team how to sign up for [employer].    For a Price Quote for your services, please call our [facility] Line at [phone].    As always, Thank you for trusting us with your health care needs!    Sofía Carter [facility]    I spent 27 minutes of time with the patient and >50% of it was in education and counseling regarding weight management.    The information in this document, created by the medical scribe for me, accurately reflects the services I personally performed and the decisions made by me. I have reviewed and approved this document for accuracy prior to leaving the patient care area.  Octavia Solis MD  2:56 PM, 11/22/17    Octavia Solis MD  Internal Medicine    American Board of Obesity Medicine Diplomate     Start 2:56 PM  End 3:23 PM

## 2017-11-21 ENCOUNTER — TELEPHONE (OUTPATIENT)
Dept: CARDIOLOGY | Facility: CLINIC | Age: 57
End: 2017-11-21

## 2017-11-21 DIAGNOSIS — I48.91 ATRIAL FIBRILLATION (H): Primary | ICD-10-CM

## 2017-11-21 NOTE — TELEPHONE ENCOUNTER
"Patient called stating that her symptoms returned and states that \"she has a little bit of a breathing problem\" that occurs in the evening with chest pain.  Cardiac MRI is negative for ischemia.  Unsure if this is related to afib.  Message sent to Dr. Pierre to advise if she is to start flecainide prior to when the monitor is completed in 1 week.  This has been an ongoing issue.  Advised patient that if her shortness of breath is worsened and not tolerable to go to the ED to be evaluated.  Patient verbalized understanding.    Marnie Lackey RN  Care Coordinator  [facility]  [phone]      "

## 2017-11-21 NOTE — TELEPHONE ENCOUNTER
Patient calling to report that she is currently wearing her ZIO Patch, but is experiencing worsening shortness of breath at night.  She states that is scaring her and that she would like to see if there is something she can do or take now.  She has an upcoming follow up appointment schedule with Dr. Ira Pierre for 12/11/17, however, she does not feel comfortable waiting until then.  I advised patient that I would send a message to the cardiology nurses.  Kelsey Fajardo, WHITA

## 2017-11-22 ENCOUNTER — OFFICE VISIT (OUTPATIENT)
Dept: INTERNAL MEDICINE | Facility: CLINIC | Age: 57
End: 2017-11-22
Payer: COMMERCIAL

## 2017-11-22 VITALS
WEIGHT: 283 LBS | BODY MASS INDEX: 46.73 KG/M2 | SYSTOLIC BLOOD PRESSURE: 122 MMHG | HEART RATE: 81 BPM | OXYGEN SATURATION: 95 % | DIASTOLIC BLOOD PRESSURE: 86 MMHG | TEMPERATURE: 97 F

## 2017-11-22 DIAGNOSIS — Z79.01 CURRENT USE OF LONG TERM ANTICOAGULATION: ICD-10-CM

## 2017-11-22 DIAGNOSIS — G89.29 CHRONIC PAIN OF BOTH KNEES: ICD-10-CM

## 2017-11-22 DIAGNOSIS — I10 ESSENTIAL HYPERTENSION: ICD-10-CM

## 2017-11-22 DIAGNOSIS — E03.9 ACQUIRED HYPOTHYROIDISM: ICD-10-CM

## 2017-11-22 DIAGNOSIS — R53.81 PHYSICAL DECONDITIONING: ICD-10-CM

## 2017-11-22 DIAGNOSIS — F33.2 SEVERE EPISODE OF RECURRENT MAJOR DEPRESSIVE DISORDER, WITHOUT PSYCHOTIC FEATURES (H): ICD-10-CM

## 2017-11-22 DIAGNOSIS — E11.9 TYPE 2 DIABETES MELLITUS WITHOUT COMPLICATION, WITHOUT LONG-TERM CURRENT USE OF INSULIN (H): ICD-10-CM

## 2017-11-22 DIAGNOSIS — E66.01 MORBID OBESITY DUE TO EXCESS CALORIES (H): Primary | ICD-10-CM

## 2017-11-22 DIAGNOSIS — M25.561 CHRONIC PAIN OF BOTH KNEES: ICD-10-CM

## 2017-11-22 DIAGNOSIS — M25.562 CHRONIC PAIN OF BOTH KNEES: ICD-10-CM

## 2017-11-22 DIAGNOSIS — F41.9 ANXIETY: ICD-10-CM

## 2017-11-22 PROCEDURE — 99214 OFFICE O/P EST MOD 30 MIN: CPT | Performed by: INTERNAL MEDICINE

## 2017-11-22 RX ORDER — FLECAINIDE ACETATE 50 MG/1
75 TABLET ORAL 2 TIMES DAILY
Qty: 90 TABLET | Refills: 3 | Status: SHIPPED | OUTPATIENT
Start: 2017-11-22

## 2017-11-22 NOTE — MR AVS SNAPSHOT
"              After Visit Summary   11/22/2017    Gladys Singh    MRN: [id number]           Patient Information     Date Of Birth          1960        Visit Information        Provider Department      11/22/2017 2:30 PM Octavia Solis MD [facility]        Today's Diagnoses     Morbid obesity due to excess calories (H)    -  1    Severe episode of recurrent major depressive disorder, without psychotic features (H)        Anxiety        Type 2 diabetes mellitus without complication, without long-term current use of insulin (H)        Essential hypertension        Acquired hypothyroidism        Current use of long term anticoagulation        Chronic pain of both knees        Physical deconditioning          Care Instructions    Start eating by the \"Plate Method\" at meals.  Try to add some form of protein to every meal- plant proteins are fine.    Schedule with diabetic focused physical therapy to help you get back into activity.     Call to see if the diabetic educator has any group sessions.    One of the health coaches (Melissa or Irasema) will call you to help you set goals and hold you accountable.     Follow up with me or Vania Monroe monthly.      [facility]    If you have any questions regarding to your visit please contact your care team:     Team Pink:   Clinic Hours Telephone Number   Internal Medicine:  Dr. Octavia Monroe, NP       7am-7pm  Monday - Thursday   7am-5pm  Fridays  [phone]  (Appointment scheduling available 24/7)    Questions about your visit?  Team Line  [phone]   Urgent Care - [place] Gomez and [place] - 11am-9pm Monday-Friday Saturday-Sunday- 9am-5pm   [place] - 5pm-9pm Monday-Friday Saturday-Sunday- 9am-5pm  [phone] - [place]   [phone] - [place]       What options do I have for visits at the clinic other than the traditional office visit?  To expand how we care for you, " many of our providers are utilizing electronic visits (e-visits) and telephone visits, when medically appropriate, for interactions with their patients rather than a visit in the clinic.   We also offer nurse visits for many medical concerns. Just like any other service, we will bill your insurance company for this type of visit based on time spent on the phone with your provider. Not all insurance companies cover these visits. Please check with your medical insurance if this type of visit is covered. You will be responsible for any charges that are not paid by your insurance.      E-visits via [employer]op[employer]:  generally incur a $35.00 fee.  Telephone visits:  Time spent on the phone: *charged based on time that is spent on the phone in increments of 10 minutes. Estimated cost:   5-10 mins $30.00   11-20 mins. $59.00   21-30 mins. $85.00   Use [employer] (secure email communication and access to your chart) to send your primary care provider a message or make an appointment. Ask someone on your Team how to sign up for [employer].    For a Price Quote for your services, please call our [email or web address] Line at [phone].    As always, Thank you for trusting us with your health care needs!    Sofía Carter, [facility]          Follow-ups after your visit        Additional Services     DIABETES EDUCATOR REFERRAL       DIABETES SELF MANAGEMENT TRAINING (DSMT)      Your provider has referred you to Diabetes Education: FMG: Diabetes Education - All [facility] [phone]   [email or web address]     If an urgent visit is needed or A1C is above 12, Care Team to call the Diabetes  Education Team at [phone] or send an In Basket message to the Diabetes Education Pool (P DIAB ED-PATIENT CARE).    A  will call you to make your appointment. If it has been more than 3 business days since your referral was placed, please call the above phone number to schedule.    Type of training and  number of hours: Previous Diagnosis: Follow-up DSMT - 2 hours.    Medicare covers: 10 hours of initial DSMT in 12 month period from the time of first visit, plus 2 hours of follow-up DSMT annually, and additional hours as requested for insulin training.    Diabetes Type: Type 2 - Diet Control             Diabetes Co-Morbidities: none               A1C Goal:  <7.0       A1C is: Lab Results       Component                Value               Date                       A1C                      6.0                 12/06/2016              Diabetes Education Topics: Knowledge: Healthy Eating and Being Active    Special Educational Needs Requiring Individual DSMT: None       MEDICAL NUTRITION THERAPY (MNT) for Diabetes    Medical Nutrition Therapy with a Registered Dietitian can be provided in coordination with Diabetes Self-Management Training to assist in achieving optimal diabetes management.    MNT Type and Hours: Previous diagnosis: Annual follow-up MNT - 2 hours                       Medicare will cover: 3 hours initial MNT in 12 month period after first visit, plus 2 hours of follow-up MNT annually    Please be aware that coverage of these services is subject to the terms and limitations of your health insurance plan.  Call member services at your health plan to determine Diabetes Self-Management Training (Codes  &amp; ) and Medical Nutrition Therapy (Codes 31085 & 47099) benefits and ask which blood glucose monitor brands are covered by your plan.  Please bring the following with you to your appointment:    (1)  List of current medications   (2)  List of Blood Glucose Monitor brands that are covered by your insurance plan  (3)  Blood Glucose Monitor and log book  (4)   Food records for the 3 days prior to your visit    The Certified Diabetes Educator may make diabetes medication adjustments per the CDE Protocol and Collaborative Practice Agreement.            HEALTH  REFERRAL       Your provider has  referred you to a Health .    A Health  will reach out to the patient within three days, if the following criteria has been met.     Clinic: [facility]    Reason for Referral: Diabetes/Obesity    Patient does have knowledge of this service.    Patient Criteria: Diabetes (A1C Score >= 8)/Obesity (BMI >= 35kg/m2) - diabetes is under control however    Provider's Main Focus: Diet/Weight Loss and Lifestyle Changes/Disease Management:            [facility] PT, HAND, AND CHIROPRACTIC REFERRAL       **This order will print in the [facility] Scheduling Office**    Physical Therapy, Hand Therapy and Chiropractic Care are available through:    *[facility]  *[facility]  *[facility]    Call one number to schedule at any of the above locations: [phone].    Your provider has referred you to: Physical Therapy at [facility] or [facility]    Indication/Reason for Referral: bilateral knee pain, diabetic focused exercise, physical deconditioning - bariatric program   Onset of Illness:   Therapy Orders: Evaluate and Treat  Special Programs:   Special Request:     Dario Bishop      Additional Comments for the Therapist or Chiropractor:     Please be aware that coverage of these services is subject to the terms and limitations of your health insurance plan.  Call member services at your health plan with any benefit or coverage questions.      Please bring the following to your appointment:    *Your personal calendar for scheduling future appointments  *Comfortable clothing                  Your next 10 appointments already scheduled     Dec 11, 2017  4:20 PM CST   Return Visit with Ira Pierre MD   [facility] ([facility])    [address] [place] [address] [address] [facility] [id number] [phone]              Who to contact     If you have questions or need follow up information about today's clinic visit or your schedule  please contact [facility] directly at [phone].  Normal or non-critical lab and imaging results will be communicated to you by MyChart, letter or phone within 4 business days after the clinic has received the results. If you do not hear from us within 7 days, please contact the clinic through [employer] or phone. If you have a critical or abnormal lab result, we will notify you by phone as soon as possible.  Submit refill requests through [employer] or call your pharmacy and they will forward the refill request to us. Please allow 3 business days for your refill to be completed.          Additional Information About Your Visit        [employer] Information     [employer] gives you secure access to your electronic health record. If you see a primary care provider, you can also send messages to your care team and make appointments. If you have questions, please call your primary care clinic.  If you do not have a primary care provider, please call [phone] and they will assist you.        Care EveryWhere ID     This is your Care EveryWhere ID. This could be used by other organizations to access your [place] medical records  [id number]        Your Vitals Were     Pulse Temperature Last Period Pulse Oximetry BMI (Body Mass Index)       81 97  F (36.1  C) (Oral) 02/01/2010 95% 46.73 kg/m2        Blood Pressure from Last 3 Encounters:   11/22/17 122/86   11/03/17 118/70   10/31/17 120/78    Weight from Last 3 Encounters:   11/22/17 283 lb (128.4 kg)   10/31/17 285 lb (129.3 kg)   09/20/17 286 lb (129.7 kg)              We Performed the Following     DIABETES EDUCATOR REFERRAL     HEALTH  REFERRAL     [facility] PT, HAND, AND CHIROPRACTIC REFERRAL        Primary Care Provider Office Phone # Fax #    JACKELINE Arenas [phone]       [address] [facility] [place] [address]        Equal Access to Services     OSCAR URBANO AH: Luis Alfredo Land, waaxda venecia, qaybta  chelsie estevezseamus arguello ah. Ladan [facility] [phone].    ATENCIÓN: Si reji espino, tiene a eason disposición servicios gratuitos de asistencia lingüística. Cathryn al [phone].    We comply with applicable federal civil rights laws and [place] laws. We do not discriminate on the basis of race, color, national origin, age, disability, sex, sexual orientation, or gender identity.            Thank you!     Thank you for choosing [facility]  for your care. Our goal is always to provide you with excellent care. Hearing back from our patients is one way we can continue to improve our services. Please take a few minutes to complete the written survey that you may receive in the mail after your visit with us. Thank you!             Your Updated Medication List - Protect others around you: Learn how to safely use, store and throw away your medicines at www.disposemymeds.org.          This list is accurate as of: 11/22/17  3:25 PM.  Always use your most recent med list.                   Brand Name Dispense Instructions for use Diagnosis    AMBIEN 10 MG tablet   Generic drug:  zolpidem      1 TABLET DAILY AT BEDTIME        ASPIRIN NOT PRESCRIBED    INTENTIONAL    0 each    Please choose reason not prescribed, below    Paroxysmal atrial fibrillation (H)       blood glucose monitoring lancets     1 Box    TEST ONCE DAILY OR AS NEEDED *DISPENSE WHATEVER IS COVERED BY INS*    Type 2 diabetes mellitus without complication, without long-term current use of insulin (H)       blood glucose monitoring meter device kit    no brand specified    1 kit    Use to test blood sugar one times daily or as directed.    Type 2 diabetes mellitus without complication, without long-term current use of insulin (H)       blood glucose monitoring test strip    no brand specified    100 strip    Use to test blood sugars one times daily or as directed    Type 2 diabetes mellitus without complication,  without long-term current use of insulin (H)       BUSPAR PO      Take 30 mg by mouth 2 times daily        BYDUREON 2 MG pen   Generic drug:  exenatide ER      Inject 2 mg Subcutaneous every 7 days        calcium carbonate 1250 (500 CA) MG Tabs tablet    OSCAL 500    270 tablet    TAKE 1 TABLET BY MOUTH. THREE TIMES DAILY.    Elevated PTHrP level (H)       CVS SPECTRAVITE ULTRA WOMEN Tabs     90 tablet    TAKE 1 TABLET BY MOUTH DAILY    Constipation, chronic       cyanocobalamin 1000 MCG Tabs    CVS vitamin  B12    90 tablet    TAKE 1 TABLET (1,000 MCG) BY MOUTH DAILY    Vitamin B12 deficiency (non anemic)       dabigatran ANTICOAGULANT 150 MG capsule    PRADAXA    60 capsule    Take 1 capsule (150 mg) by mouth twice daily. Store in original 's bottle or blister pack; use within 120 days of opening.    Paroxysmal atrial fibrillation (H)       docusate sodium 100 MG capsule    COLACE    180 capsule    TAKE 1 CAPSULE (100 MG) BY MOUTH 2 TIMES DAILY    Constipation, unspecified constipation type       escitalopram 20 MG tablet    LEXAPRO    90 tablet    Take 1 tablet (20 mg) by mouth daily    Paroxysmal atrial fibrillation (H)       Ferrous Sulfate 143 (45 FE) MG Tbcr    CVS SLOW RELEASE IRON    180 tablet    Take 1 tablet by mouth 2 times daily    Iron deficiency       furosemide 40 MG tablet    LASIX    90 tablet    TAKE 1 TABLET (40 MG) BY MOUTH DAILY    Pedal edema       insulin pen needle 31G X 5 MM    B-D U/F    100 each    Use once daily or as directed.    Type 2 diabetes mellitus without complication (H)       KLOR-CON 20 MEQ CR tablet   Generic drug:  potassium chloride SA     180 tablet    TAKE 1 TABLET (20 MEQ) BY MOUTH 2 TIMES DAILY    Diuresis       levothyroxine 75 MCG tablet    SYNTHROID/LEVOTHROID    60 tablet    TAKE 1 TABLET (75 MG) BY MOUTH EVERY DAY    Acquired hypothyroidism, Vitamin D deficiency, Hyperparathyroidism (H), Abdominal pain, epigastric       lisinopril 10 MG tablet     PRINIVIL/ZESTRIL    90 tablet    TAKE 1 TABLET (10 MG) BY MOUTH DAILY    Type 2 diabetes mellitus without complication, without long-term current use of insulin (H), Essential hypertension with goal blood pressure less than 140/90       LORazepam 1 MG tablet    ATIVAN     Take 1 mg by mouth At Bedtime        metoprolol 100 MG 24 hr tablet    TOPROL-XL    90 tablet    Take 1 tablet (100 mg) by mouth daily    Paroxysmal atrial fibrillation (H), Chest pain, unspecified type       omeprazole 20 MG CR capsule    priLOSEC    180 capsule    Take 1 capsule (20 mg) by mouth 2 times daily    Hiatal hernia       order for DME     1 Box    Test strips for pt's glucometer, brand as covered by insurance. Test twice daily or PRN.    Type 2 diabetes mellitus without complication (H)       ranitidine 150 MG tablet    ZANTAC    60 tablet    TAKE 1 TABLET (150 MG) BY MOUTH 2 TIMES DAILY    Gastroesophageal reflux disease, esophagitis presence not specified       simvastatin 20 MG tablet    ZOCOR    90 tablet    TAKE 1 TABLET (20 MG) BY MOUTH AT BEDTIME NEED LABS    Hyperlipidemia LDL goal <100       vitamin D 98671 UNIT capsule    ERGOCALCIFEROL    8 capsule    TAKE 1 CAPSULE BY MOUTH ONCE WEEKLY.    Vitamin D deficiency, Hyperparathyroidism (H), Acquired hypothyroidism, Abdominal pain, epigastric       Vitamin D3 3000 UNITS Tabs

## 2017-11-22 NOTE — TELEPHONE ENCOUNTER
Per Dr. Pierre, patient can start flecainide 75 mg twice daily and then have a treadmill stress test 1 week after starting flecainide.  Patient informed and verbalized understanding.  Rx sent to preferred pharmacy. Treadmill stress test ordered and scheduled.  Patient was advised not to stop/hold any medications prior to the stress test.   Patient verbalized understanding.      New Medication: flecainide 75 mg BID.  Patient was educated regarding newly prescribed medication, including discussion of  the indication, administration, side effects, and when to report to MD or RN. Patient demonstrated understanding of this information and agreed to call with further questions or concerns.      Marnie Lackey, RACHAEL  Care Coordinator  [facility]  [phone]

## 2017-11-22 NOTE — Clinical Note
Dr. Gabby Hernandez.  Thanks for sending Gladys to the weight clinic.  She eats a high carb vegan diet with excessive calorie intake.  She seems very receptive to changes so I am hopeful we will make progress.  Octavia

## 2017-11-22 NOTE — PATIENT INSTRUCTIONS
"Start eating by the \"Plate Method\" at meals.  Try to add some form of protein to every meal- plant proteins are fine.    Schedule with diabetic focused physical therapy to help you get back into activity.     Call to see if the diabetic educator has any group sessions.    One of the health coaches (Melissa or Irasema) will call you to help you set goals and hold you accountable.     Follow up with me or Vania Monroe monthly.      [facility]    If you have any questions regarding to your visit please contact your care team:     Team Pink:   Clinic Hours Telephone Number   Internal Medicine:  Dr. Octavia Monroe, NP       7am-7pm  Monday - Thursday   7am-5pm  Fridays  [phone]  (Appointment scheduling available 24/7)    Questions about your visit?  Team Line  [phone]   [facility] [place] [facility] 11am-9pm Monday-Friday Saturday-Sunday- 9am-5pm   [place] - 5pm-9pm Monday-Friday Saturday-Sunday- 9am-5pm  [phone] - [place]   [phone] - [place]       What options do I have for visits at the clinic other than the traditional office visit?  To expand how we care for you, many of our providers are utilizing electronic visits (e-visits) and telephone visits, when medically appropriate, for interactions with their patients rather than a visit in the clinic.   We also offer nurse visits for many medical concerns. Just like any other service, we will bill your insurance company for this type of visit based on time spent on the phone with your provider. Not all insurance companies cover these visits. Please check with your medical insurance if this type of visit is covered. You will be responsible for any charges that are not paid by your insurance.      E-visits via [employer]:  generally incur a $35.00 fee.  Telephone visits:  Time spent on the phone: *charged based on time that is spent on the phone in increments of 10 minutes. " Estimated cost:   5-10 mins $30.00   11-20 mins. $59.00   21-30 mins. $85.00   Use [employer] (secure email communication and access to your chart) to send your primary care provider a message or make an appointment. Ask someone on your Team how to sign up for [employer].    For a Price Quote for your services, please call our [employer] Price Line at [phone].    As always, Thank you for trusting us with your health care needs!    Sofía Carter, CMA

## 2017-11-29 DIAGNOSIS — I48.91 ATRIAL FIBRILLATION (H): ICD-10-CM

## 2017-11-29 PROCEDURE — 93017 CV STRESS TEST TRACING ONLY: CPT | Performed by: INTERNAL MEDICINE

## 2017-11-29 PROCEDURE — 93018 CV STRESS TEST I&R ONLY: CPT | Performed by: INTERNAL MEDICINE

## 2017-11-29 PROCEDURE — 93016 CV STRESS TEST SUPVJ ONLY: CPT | Performed by: INTERNAL MEDICINE

## 2017-12-06 ENCOUNTER — THERAPY VISIT (OUTPATIENT)
Dept: PHYSICAL THERAPY | Facility: CLINIC | Age: 57
End: 2017-12-06
Payer: COMMERCIAL

## 2017-12-06 DIAGNOSIS — R53.81 PHYSICAL DECONDITIONING: Primary | ICD-10-CM

## 2017-12-06 PROCEDURE — 97530 THERAPEUTIC ACTIVITIES: CPT | Mod: GP | Performed by: PHYSICAL THERAPIST

## 2017-12-06 PROCEDURE — 97161 PT EVAL LOW COMPLEX 20 MIN: CPT | Mod: GP | Performed by: PHYSICAL THERAPIST

## 2017-12-06 NOTE — PROGRESS NOTES
Subjective:    Patient is a 57 year old female presenting with rehab general hpi.   Gladys Singh is a 57 year old female with deconditioning condition which occurred with cardiovascular disease.      This is a chronic condition  Patient reports having no pain. Patient only wants an exercise program to improve her cardiovascular fitness while improving her lower extremity strength.    Site of Pain: no pain reported. Radiates to: none. Quality: none.     and is constant Pain Scale: 0/10. Associated symptoms:  Fatigue. Pain is worse during the day.  Symptoms are exacerbated by activity and relieved by rest (A-fib medication). Since onset symptoms are unchanged. Special testing: none.         General health as reported by patient is good.                     Red flags:  Changes in bowel and bladder habits.                      Objective:    System                                                Knee Evaluation:              Functional Testing:          Quad:    Single Leg Squat:  Left:      Right:        Bilateral Leg Squat:   Moderate loss of control and femoral IR                  General Evaluation:  AROM:  normal                Lower Extremity Strength:  Strength wnl lower extremity general: knees grossly 4/5 bilaterally. hips grossly 4-/5 bilaterally.            Palpation:  normal                                                         ROS    Assessment/Plan:      Patient is a 57 year old female with physical deconditioning complaints.    Patient has the following significant findings with corresponding treatment plan.                Diagnosis 1:  Physical deconditioning  Decreased strength - therapeutic exercise, therapeutic activities and home program  Decreased function - therapeutic activities and home program    Therapy Evaluation Codes:   1) History comprised of:   Personal factors that impact the plan of care:      None.    Comorbidity factors that impact the plan of care are:      Overweight.      Medications impacting care: None.  2) Examination of Body Systems comprised of:   Body structures and functions that impact the plan of care:      Knee.   Activity limitations that impact the plan of care are:      Running, Stairs and Walking.  3) Clinical presentation characteristics are:   Stable/Uncomplicated.  4) Decision-Making    Low complexity using standardized patient assessment instrument and/or measureable assessment of functional outcome.  Cumulative Therapy Evaluation is: Low complexity.    Previous and current functional limitations:  (See Goal Flow Sheet for this information)    Short term and Long term goals: (See Goal Flow Sheet for this information)     Communication ability:  Patient appears to be able to clearly communicate and understand verbal and written communication and follow directions correctly.  Treatment Explanation - The following has been discussed with the patient:   RX ordered/plan of care  Anticipated outcomes  Possible risks and side effects  This patient would benefit from PT intervention to resume normal activities.   Rehab potential is good.    Frequency:  2 X month, once daily  Duration:  for 2 months  Discharge Plan:  Achieve all LTG.  Independent in home treatment program.  Reach maximal therapeutic benefit.    Please refer to the daily flowsheet for treatment today, total treatment time and time spent performing 1:1 timed codes.

## 2017-12-06 NOTE — PROGRESS NOTES
Subjective:    Patient is a 57 year old female presenting with rehab left ankle/foot hpi.                                      Pertinent medical history includes:  Diabetes, overweight, high blood pressure, depression, thyroid problems, anemia, sleep disorder/apnea and other.  Medical allergies: no.  Other surgeries include:  Orthopedic surgery.  Current medications:  Cardiac medication, thyroid medication, sleep medication, anti-depressants and high blood pressure medication.  Current occupation is PCA.    Primary job tasks include:  Prolonged sitting.                                Objective:    System    Physical Exam    General     ROS    Assessment/Plan:

## 2017-12-06 NOTE — MR AVS SNAPSHOT
After Visit Summary   12/6/2017    Gladys Singh    MRN: [id number]           Patient Information     Date Of Birth          1960        Visit Information        Provider Department      12/6/2017 8:40 AM Sha Leija, PT [facility]        Today's Diagnoses     Physical deconditioning    -  1       Follow-ups after your visit        Your next 10 appointments already scheduled     Dec 08, 2017 10:00 AM CST   Nurse Only with HEALTH  - REGION 1   [facility] ([facility]    [address] [facility] [address]   [phone]            Dec 11, 2017  4:20 PM CST   Return Visit with Ira Pierre MD   [facility] PHYSICIANS HEART AT [facility] ([facility])    [address] 2nd [address]  [place] [address]   [phone]            Dec 13, 2017 10:15 AM CST   Diabetic Education with  DIABETIC ED RESOURCE   [facility] ([facility])    [address] [address]   [phone]            Dec 20, 2017 10:00 AM CST   FLORENTINO Extremity with Sha Leija, PT   [facility] ([facility]    [address]  [address]  [place] [address] [phone]            Dec 26, 2017 10:45 AM CST   SHORT with Octavia Solis MD   [facility] ([facility])    [address] [facility] [address] [phone]            Jan 22, 2018  8:30 AM CST   New Visit with Titus Wolf MD   [facility])    [address] [address] [phone]              Who to contact     If you have questions or need follow up information about today's clinic visit or your schedule please contact [facility] directly at [phone].  Normal or non-critical lab and imaging results will be communicated to you by MyChart, letter or phone within 4 business days after the clinic has  received the results. If you do not hear from us within 7 days, please contact the clinic through [employer] or phone. If you have a critical or abnormal lab result, we will notify you by phone as soon as possible.  Submit refill requests through [employer] or call your pharmacy and they will forward the refill request to us. Please allow 3 business days for your refill to be completed.          Additional Information About Your Visit        [employer] Information     [employer] gives you secure access to your electronic health record. If you see a primary care provider, you can also send messages to your care team and make appointments. If you have questions, please call your primary care clinic.  If you do not have a primary care provider, please call [phone] and they will assist you.        Care EveryWhere ID     This is your Care EveryWhere ID. This could be used by other organizations to access your [place] medical records  [id number]        Your Vitals Were     Last Period                   02/01/2010            Blood Pressure from Last 3 Encounters:   11/22/17 122/86   11/03/17 118/70   10/31/17 120/78    Weight from Last 3 Encounters:   11/22/17 128.4 kg (283 lb)   10/31/17 129.3 kg (285 lb)   09/20/17 129.7 kg (286 lb)              We Performed the Following     PT Eval, Low Complexity (33777)     Therapeutic Activities        Primary Care Provider Office Phone # Fax #    Vania Ramsey Oneil Almendarez, JACKELINE [facility] [phone]       [address] [facility] [address]        Equal Access to Services     OSCAR URBANO : Hadii aad ku hadasho Soomaali, waaxda luqadaha, qaybta kaalmada adeegyada, chelsie jacobsin haylinda arguello . So [facility] [phone].    ATENCIÓN: Si habla español, tiene a eason disposición servicios gratuitos de asistencia lingüística. Llame al [phone].    We comply with applicable federal civil rights laws and [place] laws. We do not discriminate on the basis of race, color,  national origin, age, disability, sex, sexual orientation, or gender identity.            Thank you!     Thank you for choosing [facility]  for your care. Our goal is always to provide you with excellent care. Hearing back from our patients is one way we can continue to improve our services. Please take a few minutes to complete the written survey that you may receive in the mail after your visit with us. Thank you!             Your Updated Medication List - Protect others around you: Learn how to safely use, store and throw away your medicines at www.disposemymeds.org.          This list is accurate as of: 12/6/17  9:23 AM.  Always use your most recent med list.                   Brand Name Dispense Instructions for use Diagnosis    AMBIEN 10 MG tablet   Generic drug:  zolpidem      1 TABLET DAILY AT BEDTIME        ASPIRIN NOT PRESCRIBED    INTENTIONAL    0 each    Please choose reason not prescribed, below    Paroxysmal atrial fibrillation (H)       blood glucose monitoring lancets     1 Box    TEST ONCE DAILY OR AS NEEDED *DISPENSE WHATEVER IS COVERED BY INS*    Type 2 diabetes mellitus without complication, without long-term current use of insulin (H)       blood glucose monitoring meter device kit    no brand specified    1 kit    Use to test blood sugar one times daily or as directed.    Type 2 diabetes mellitus without complication, without long-term current use of insulin (H)       blood glucose monitoring test strip    no brand specified    100 strip    Use to test blood sugars one times daily or as directed    Type 2 diabetes mellitus without complication, without long-term current use of insulin (H)       BUSPAR PO      Take 30 mg by mouth 2 times daily        BYDUREON 2 MG pen   Generic drug:  exenatide ER      Inject 2 mg Subcutaneous every 7 days        calcium carbonate 1250 (500 CA) MG Tabs tablet    OSCAL 500    270 tablet    TAKE 1 TABLET BY MOUTH. THREE TIMES DAILY.    Elevated PTHrP level (H)        CVS SPECTRAVITE ULTRA WOMEN Tabs     90 tablet    TAKE 1 TABLET BY MOUTH DAILY    Constipation, chronic       cyanocobalamin 1000 MCG Tabs    CVS vitamin  B12    90 tablet    TAKE 1 TABLET (1,000 MCG) BY MOUTH DAILY    Vitamin B12 deficiency (non anemic)       dabigatran ANTICOAGULANT 150 MG capsule    PRADAXA    60 capsule    Take 1 capsule (150 mg) by mouth twice daily. Store in original 's bottle or blister pack; use within 120 days of opening.    Paroxysmal atrial fibrillation (H)       docusate sodium 100 MG capsule    COLACE    180 capsule    TAKE 1 CAPSULE (100 MG) BY MOUTH 2 TIMES DAILY    Constipation, unspecified constipation type       escitalopram 20 MG tablet    LEXAPRO    90 tablet    Take 1 tablet (20 mg) by mouth daily    Paroxysmal atrial fibrillation (H)       Ferrous Sulfate 143 (45 FE) MG Tbcr    CVS SLOW RELEASE IRON    180 tablet    Take 1 tablet by mouth 2 times daily    Iron deficiency       flecainide 50 MG tablet    TAMBOCOR    90 tablet    Take 1.5 tablets (75 mg) by mouth 2 times daily    Atrial fibrillation (H)       furosemide 40 MG tablet    LASIX    90 tablet    TAKE 1 TABLET (40 MG) BY MOUTH DAILY    Pedal edema       insulin pen needle 31G X 5 MM    B-D U/F    100 each    Use once daily or as directed.    Type 2 diabetes mellitus without complication (H)       KLOR-CON 20 MEQ CR tablet   Generic drug:  potassium chloride SA     180 tablet    TAKE 1 TABLET (20 MEQ) BY MOUTH 2 TIMES DAILY    Diuresis       levothyroxine 75 MCG tablet    SYNTHROID/LEVOTHROID    60 tablet    TAKE 1 TABLET (75 MG) BY MOUTH EVERY DAY    Acquired hypothyroidism, Vitamin D deficiency, Hyperparathyroidism (H), Abdominal pain, epigastric       lisinopril 10 MG tablet    PRINIVIL/ZESTRIL    90 tablet    TAKE 1 TABLET (10 MG) BY MOUTH DAILY    Type 2 diabetes mellitus without complication, without long-term current use of insulin (H), Essential hypertension with goal blood pressure less than  140/90       LORazepam 1 MG tablet    ATIVAN     Take 1 mg by mouth At Bedtime        metoprolol 100 MG 24 hr tablet    TOPROL-XL    90 tablet    Take 1 tablet (100 mg) by mouth daily    Paroxysmal atrial fibrillation (H), Chest pain, unspecified type       omeprazole 20 MG CR capsule    priLOSEC    180 capsule    Take 1 capsule (20 mg) by mouth 2 times daily    Hiatal hernia       order for DME     1 Box    Test strips for pt's glucometer, brand as covered by insurance. Test twice daily or PRN.    Type 2 diabetes mellitus without complication (H)       ranitidine 150 MG tablet    ZANTAC    60 tablet    TAKE 1 TABLET (150 MG) BY MOUTH 2 TIMES DAILY    Gastroesophageal reflux disease, esophagitis presence not specified       simvastatin 20 MG tablet    ZOCOR    90 tablet    TAKE 1 TABLET (20 MG) BY MOUTH AT BEDTIME NEED LABS    Hyperlipidemia LDL goal <100       vitamin D 43841 UNIT capsule    ERGOCALCIFEROL    8 capsule    TAKE 1 CAPSULE BY MOUTH ONCE WEEKLY.    Vitamin D deficiency, Hyperparathyroidism (H), Acquired hypothyroidism, Abdominal pain, epigastric       Vitamin D3 3000 UNITS Tabs

## 2017-12-08 ENCOUNTER — ALLIED HEALTH/NURSE VISIT (OUTPATIENT)
Dept: NURSING | Facility: CLINIC | Age: 57
End: 2017-12-08
Payer: COMMERCIAL

## 2017-12-08 DIAGNOSIS — E66.01 MORBID OBESITY DUE TO EXCESS CALORIES (H): Primary | ICD-10-CM

## 2017-12-08 PROCEDURE — 0298T ZZC EXT ECG > 48HR TO 21 DAY REVIEW AND INTERPRETATN: CPT | Performed by: INTERNAL MEDICINE

## 2017-12-08 PROCEDURE — 99207 ZZC HEALTH COACHING, NO CHARGE: CPT

## 2017-12-08 ASSESSMENT — PATIENT HEALTH QUESTIONNAIRE - PHQ9: SUM OF ALL RESPONSES TO PHQ QUESTIONS 1-9: 7

## 2017-12-08 NOTE — MR AVS SNAPSHOT
After Visit Summary   12/8/2017    Gladys Singh    MRN: [id number]           Patient Information     Date Of Birth          1960        Visit Information        Provider Department      12/8/2017 10:00 AM HEALTH  - REGION [facility]        Today's Diagnoses     Morbid obesity due to excess calories (H)    -  1       Follow-ups after your visit        Your next 10 appointments already scheduled     Dec 11, 2017  4:20 PM CST   Return Visit with Ira Pierre MD   [facility] PHYSICIANS HEART AT [facility] ([facility])    [address]  [facility] [address]   [phone]            Dec 13, 2017 10:15 AM CST   Diabetic Education with  DIABETIC ED RESOURCE   [facility] ([facility])    [address] [place] [address]   [phone]            Dec 20, 2017 10:00 AM CST   FLORENTINO Extremity with Sha Leija, PT   [facility] ([facility])    [address] [place] [address]  [address]  [facility] [address]   [phone]            Dec 26, 2017 10:45 AM CST   SHORT with Octavia Solis MD   [facility] ([facility])    [address] [place] [address] [phone]            Jan 05, 2018 10:00 AM CST   Nurse Only with HEALTH  - REGION 2   [facility] ([facility])    [address] [place] [address] [phone]            Jan 22, 2018  8:30 AM CST   New Visit with Titus Wolf MD   [facility] ([facility])    [address] [place] [address] [phone]              Who to contact     If you have questions or need follow up information about today's clinic visit or your schedule please contact [facility] directly at [phone].  Normal or non-critical lab and imaging results will be communicated to you by MyChart, letter or phone within 4  business days after the clinic has received the results. If you do not hear from us within 7 days, please contact the clinic through [employer] or phone. If you have a critical or abnormal lab result, we will notify you by phone as soon as possible.  Submit refill requests through [employer] or call your pharmacy and they will forward the refill request to us. Please allow 3 business days for your refill to be completed.          Additional Information About Your Visit        [employer] Information     [employer] gives you secure access to your electronic health record. If you see a primary care provider, you can also send messages to your care team and make appointments. If you have questions, please call your primary care clinic.  If you do not have a primary care provider, please call [phone] and they will assist you.        Care EveryWhere ID     This is your Care EveryWhere ID. This could be used by other organizations to access your [place] medical records  [id number]        Your Vitals Were     Last Period                   02/01/2010            Blood Pressure from Last 3 Encounters:   11/22/17 122/86   11/03/17 118/70   10/31/17 120/78    Weight from Last 3 Encounters:   11/22/17 283 lb (128.4 kg)   10/31/17 285 lb (129.3 kg)   09/20/17 286 lb (129.7 kg)              Today, you had the following     No orders found for display       Primary Care Provider Office Phone # Fax #    Vania Ramsey Oneil Almendarez, JACKELINE [facility] [phone]       [address] [place] [address]        Equal Access to Services     OSCAR URBANO : Hadii aad ku hadasho Soomaali, waaxda luqadaha, qaybta kaalmada adeegyada, waxay arlenein haynellien neelima arguello . So [facility] [phone].    ATENCIÓN: Si habla español, tiene a eason disposición servicios gratuitos de asistencia lingüística. Llame al [phone].    We comply with applicable federal civil rights laws and [place] laws. We do not discriminate on the basis of race,  color, national origin, age, disability, sex, sexual orientation, or gender identity.            Thank you!     Thank you for choosing [facility]  for your care. Our goal is always to provide you with excellent care. Hearing back from our patients is one way we can continue to improve our services. Please take a few minutes to complete the written survey that you may receive in the mail after your visit with us. Thank you!             Your Updated Medication List - Protect others around you: Learn how to safely use, store and throw away your medicines at www.disposemymeds.org.          This list is accurate as of: 12/8/17 11:38 AM.  Always use your most recent med list.                   Brand Name Dispense Instructions for use Diagnosis    AMBIEN 10 MG tablet   Generic drug:  zolpidem      1 TABLET DAILY AT BEDTIME        ASPIRIN NOT PRESCRIBED    INTENTIONAL    0 each    Please choose reason not prescribed, below    Paroxysmal atrial fibrillation (H)       blood glucose monitoring lancets     1 Box    TEST ONCE DAILY OR AS NEEDED *DISPENSE WHATEVER IS COVERED BY INS*    Type 2 diabetes mellitus without complication, without long-term current use of insulin (H)       blood glucose monitoring meter device kit    no brand specified    1 kit    Use to test blood sugar one times daily or as directed.    Type 2 diabetes mellitus without complication, without long-term current use of insulin (H)       blood glucose monitoring test strip    no brand specified    100 strip    Use to test blood sugars one times daily or as directed    Type 2 diabetes mellitus without complication, without long-term current use of insulin (H)       BUSPAR PO      Take 30 mg by mouth 2 times daily        BYDUREON 2 MG pen   Generic drug:  exenatide ER      Inject 2 mg Subcutaneous every 7 days        calcium carbonate 1250 (500 CA) MG Tabs tablet    OSCAL 500    270 tablet    TAKE 1 TABLET BY MOUTH. THREE TIMES DAILY.     Elevated PTHrP level (H)       CVS SPECTRAVITE ULTRA WOMEN Tabs     90 tablet    TAKE 1 TABLET BY MOUTH DAILY    Constipation, chronic       cyanocobalamin 1000 MCG Tabs    CVS vitamin  B12    90 tablet    TAKE 1 TABLET (1,000 MCG) BY MOUTH DAILY    Vitamin B12 deficiency (non anemic)       dabigatran ANTICOAGULANT 150 MG capsule    PRADAXA    60 capsule    Take 1 capsule (150 mg) by mouth twice daily. Store in original 's bottle or blister pack; use within 120 days of opening.    Paroxysmal atrial fibrillation (H)       docusate sodium 100 MG capsule    COLACE    180 capsule    TAKE 1 CAPSULE (100 MG) BY MOUTH 2 TIMES DAILY    Constipation, unspecified constipation type       escitalopram 20 MG tablet    LEXAPRO    90 tablet    Take 1 tablet (20 mg) by mouth daily    Paroxysmal atrial fibrillation (H)       Ferrous Sulfate 143 (45 FE) MG Tbcr    CVS SLOW RELEASE IRON    180 tablet    Take 1 tablet by mouth 2 times daily    Iron deficiency       flecainide 50 MG tablet    TAMBOCOR    90 tablet    Take 1.5 tablets (75 mg) by mouth 2 times daily    Atrial fibrillation (H)       furosemide 40 MG tablet    LASIX    90 tablet    TAKE 1 TABLET (40 MG) BY MOUTH DAILY    Pedal edema       insulin pen needle 31G X 5 MM    B-D U/F    100 each    Use once daily or as directed.    Type 2 diabetes mellitus without complication (H)       KLOR-CON 20 MEQ CR tablet   Generic drug:  potassium chloride SA     180 tablet    TAKE 1 TABLET (20 MEQ) BY MOUTH 2 TIMES DAILY    Diuresis       levothyroxine 75 MCG tablet    SYNTHROID/LEVOTHROID    60 tablet    TAKE 1 TABLET (75 MG) BY MOUTH EVERY DAY    Acquired hypothyroidism, Vitamin D deficiency, Hyperparathyroidism (H), Abdominal pain, epigastric       lisinopril 10 MG tablet    PRINIVIL/ZESTRIL    90 tablet    TAKE 1 TABLET (10 MG) BY MOUTH DAILY    Type 2 diabetes mellitus without complication, without long-term current use of insulin (H), Essential hypertension with goal  blood pressure less than 140/90       LORazepam 1 MG tablet    ATIVAN     Take 1 mg by mouth At Bedtime        metoprolol 100 MG 24 hr tablet    TOPROL-XL    90 tablet    Take 1 tablet (100 mg) by mouth daily    Paroxysmal atrial fibrillation (H), Chest pain, unspecified type       omeprazole 20 MG CR capsule    priLOSEC    180 capsule    Take 1 capsule (20 mg) by mouth 2 times daily    Hiatal hernia       order for DME     1 Box    Test strips for pt's glucometer, brand as covered by insurance. Test twice daily or PRN.    Type 2 diabetes mellitus without complication (H)       ranitidine 150 MG tablet    ZANTAC    60 tablet    TAKE 1 TABLET (150 MG) BY MOUTH 2 TIMES DAILY    Gastroesophageal reflux disease, esophagitis presence not specified       simvastatin 20 MG tablet    ZOCOR    90 tablet    TAKE 1 TABLET (20 MG) BY MOUTH AT BEDTIME NEED LABS    Hyperlipidemia LDL goal <100       vitamin D 69851 UNIT capsule    ERGOCALCIFEROL    8 capsule    TAKE 1 CAPSULE BY MOUTH ONCE WEEKLY.    Vitamin D deficiency, Hyperparathyroidism (H), Acquired hypothyroidism, Abdominal pain, epigastric       Vitamin D3 3000 UNITS Tabs

## 2017-12-08 NOTE — NURSING NOTE
December 8, 2017    [facility]  [address]  [place] [address]  [phone]  Health Coaching Progress Note    Patient Name: Gladys Singh Date: December 8, 2017      Session Length: 60      DATA    PRM Master Survey Scores Reviewed: Yes    Core Healthy Days Survey:    Would you say that in general your health is: : Fair    KO Score (Last Two) 7/14/2016 12/8/2017   KO Raw Score 33 32   Activation Score 41.7 62.6   KO Level 1 3       PHQ-2 Score 12/8/2017 10/31/2017   PHQ-2 Total Score Interpretation - Positive if 3 or more points; Administer PHQ-9 if positive 3 0       PHQ-9 SCORE 9/1/2017 12/8/2017   Total Score - -   Total Score 3 7       Treatment Objective(s) Addressed in This Session:  Target Behavior(s): diet/weight loss    Current Stressors / Issues:  Gladys mentions that she has been very stressed about her Afib diagnosis    What Patient Does Well:   Patient has started some PT exercises, she has been trying to walk more and do the band workouts that her physical therapist recommended  Previous Successes:   Patient lost 50 pounds with a weight management program at the [facility]  Areas in Need of Improvement:   Patient would like to focus on increasing her protein and decreasing her carbohydrate intake.  Today we discuss vegan protein options, patient sets a goal of eating at least 1 serving of protein at each meal. We also discuss some vegan protein powder.  Patient also sets a goal of going for a walk and doing her band exercises everyday as her physical therapist recommended.  Patient is trying not to overdue it because she is worried about her heart.   Barriers to Change:   Patient mentions no barriers today.   Reasons for Change:   Patient would like to change to lose weight for her heart.   Plan/Goal for the Next 4 Weeks:   GOAL #1: Eat one serving of protein/meal   GOAL #1 Progress Toward Goal: 0%  GOAL #2: Walk and do band  workout every day (or as able)  GOAL #2 Progress Toward Goal: 0%    Intervention:  Motivational Interviewing    MI Intervention: Expressed Empathy/Understanding, Supported Autonomy, Collaboration, Evocation, Permission to raise concern or advise, Open-ended questions, Reflections: simple and complex, Change talk (evoked) and Reframe     Change Talk Expressed by the Patient: Desire to change Ability to change Reasons to change Need to change Committment to change Activation    Provider Response to Change Talk: E - Evoked more info from patient about behavior change, A - Affirmed patient's thoughts, decisions, or attempts at behavior change, R - Reflected patient's change talk and S - Summarized patient's change talk statements    Assessment / Progress on Treatment Objective(s) / Homework:  New Objective established this session - PREPARATION (Decided to change - considering how); Intervened by negotiating a change plan and determining options / strategies for behavior change, identifying triggers, exploring social supports, and working towards setting a date to begin behavior change         Plan: (Homework, other):  Patient was encouraged to continue to seek condition-related information and education, as well as schedule a follow up appointment with the Health  . Patient has set self-identified goals and will monitor progress until the next appointment.  Patient will continue to follow-up with Health , Ramon Dey.      Melissa Young   none

## 2017-12-11 ENCOUNTER — OFFICE VISIT (OUTPATIENT)
Dept: CARDIOLOGY | Facility: CLINIC | Age: 57
End: 2017-12-11
Payer: COMMERCIAL

## 2017-12-11 VITALS
WEIGHT: 287 LBS | DIASTOLIC BLOOD PRESSURE: 82 MMHG | SYSTOLIC BLOOD PRESSURE: 127 MMHG | OXYGEN SATURATION: 95 % | HEART RATE: 63 BPM | BODY MASS INDEX: 47.39 KG/M2

## 2017-12-11 DIAGNOSIS — I48.0 PAROXYSMAL ATRIAL FIBRILLATION (H): Primary | ICD-10-CM

## 2017-12-11 PROCEDURE — 99215 OFFICE O/P EST HI 40 MIN: CPT | Mod: GC | Performed by: INTERNAL MEDICINE

## 2017-12-11 ASSESSMENT — PAIN SCALES - GENERAL: PAINLEVEL: MODERATE PAIN (4)

## 2017-12-11 NOTE — PROGRESS NOTES
"CC: palpitations    HPI:     Gladys Singh is a 57 year old female with a past medical history significant for hyperlipidemia, PSVT, PAF, essential HTN, hyperparathyroidism, depression, type 2 diabetes mellitus, anxiety, and KHUSHI.      Gladys Singh  s symptoms of palpitations started about 4-5 months ago and are characterized by \"fluttering\", \"squeezing\". Episodes last a couple of minutes to several hours. Symptoms are located upper left chest and do radiate to the middle of her chest. Nothing noted makes aggravates symptoms and nothing noted relieves symptoms.  Timing of symptoms are all times of the day, worse in the afternoon.  Severity is 9/10, but no pain with palpitations. Since her last appt, she states that the flecainide 75 mg BID has helped her decrease her palpitations and chest pain. She underwent a stress MRI before initiating the flecanide, and this was negative. She also underwent an exercise ekg which did not show QRS prolongation, and it was deemed safe to continue the flecainide.      Rhythm monitoring: ZIO patch 11/2017 showed PAF/AFlutter <1%, but with +2500 episodes of SVT. Afib - longest episode 1.5 minutes    PAST MEDICAL HISTORY:  Past Medical History:   Diagnosis Date     Anxiety     sees Dr. Chance     Arthritis involving multiple sites     knees, hip     ASCUS (atypical squamous cells of undetermined significance) on Pap smear 3/13/14     Benign neoplasm of stomach      BMI 40.0-44.9, adult (H)     sees Dr. Quan     Cervical high risk human papillomavirus (HPV) DNA test positive 2010     Colon polyp 2005     Constipation, chronic      Crohn's disease (H) 2012     Depression     sees Dr. Elio Chance     Diverticulosis 11/1/2010     Fundic gland polyps of stomach, benign 11/1/2010     H/O colposcopy with cervical biopsy 9/13/13    no lesions seen.  repeat pap due in 6 months     Hiatal hernia 11/1/2010     High risk HPV infection 8/21/13    NIL pap/+ HR HPV (16)     " Hyperlipidemia LDL goal <100      Hyperparathyroidism, unspecified     sees Dr Morris, Endocrine every 6 mos     Hypothyroidism     sees Dr. Morris -endo      Ileitis 9/19/12    ? crohn's     Iliotibial band friction syndrome of both knees      Ingrown toenail      Iron deficiency     on iron bid. had egd and colon per Dr. Sheppard.     KHUSHI (obstructive sleep apnea)     CPAP     Papanicolaou smear of cervix with atypical squamous cells of undetermined significance (ASC-US) 2011     Pedal edema     on lasix and potasium     Plantar fasciitis     resolved     Type II or unspecified type diabetes mellitus without mention of complication, not stated as uncontrolled     resolved     Vitamin D deficiency     sees Dr Morris, Endocrine every 6 mos       CURRENT MEDICATIONS:  Current Outpatient Prescriptions   Medication Sig Dispense Refill     flecainide (TAMBOCOR) 50 MG tablet Take 1.5 tablets (75 mg) by mouth 2 times daily 90 tablet 3     vitamin D (ERGOCALCIFEROL) 83642 UNIT capsule TAKE 1 CAPSULE BY MOUTH ONCE WEEKLY. 8 capsule 0     furosemide (LASIX) 40 MG tablet TAKE 1 TABLET (40 MG) BY MOUTH DAILY 90 tablet 2     docusate sodium (COLACE) 100 MG capsule TAKE 1 CAPSULE (100 MG) BY MOUTH 2 TIMES DAILY 180 capsule 2     lisinopril (PRINIVIL/ZESTRIL) 10 MG tablet TAKE 1 TABLET (10 MG) BY MOUTH DAILY 90 tablet 2     metoprolol (TOPROL-XL) 100 MG 24 hr tablet Take 1 tablet (100 mg) by mouth daily 90 tablet 3     levothyroxine (SYNTHROID/LEVOTHROID) 75 MCG tablet TAKE 1 TABLET (75 MG) BY MOUTH EVERY DAY 60 tablet 0     ranitidine (ZANTAC) 150 MG tablet TAKE 1 TABLET (150 MG) BY MOUTH 2 TIMES DAILY 60 tablet 5     simvastatin (ZOCOR) 20 MG tablet TAKE 1 TABLET (20 MG) BY MOUTH AT BEDTIME NEED LABS 90 tablet 1     cyanocobalamin (CVS VITAMIN  B12) 1000 MCG TABS TAKE 1 TABLET (1,000 MCG) BY MOUTH DAILY 90 tablet 1     Ferrous Sulfate (CVS SLOW RELEASE IRON) 143 (45 FE) MG TBCR Take 1 tablet by mouth 2 times daily 180 tablet 1      dabigatran ANTICOAGULANT (PRADAXA) 150 MG capsule Take 1 capsule (150 mg) by mouth twice daily. Store in original 's bottle or blister pack; use within 120 days of opening. 60 capsule 5     escitalopram (LEXAPRO) 20 MG tablet Take 1 tablet (20 mg) by mouth daily 90 tablet 1     BusPIRone HCl (BUSPAR PO) Take 30 mg by mouth 2 times daily       Cholecalciferol (VITAMIN D3) 3000 UNITS TABS        exenatide ER (BYDUREON) 2 MG pen Inject 2 mg Subcutaneous every 7 days       POTASSIUM CHLORIDE 20 MEQ CR tablet TAKE 1 TABLET (20 MEQ) BY MOUTH 2 TIMES DAILY 180 tablet 0     blood glucose monitoring (InhabiET) lancets TEST ONCE DAILY OR AS NEEDED *DISPENSE WHATEVER IS COVERED BY INS* 1 Box 3     calcium carbonate (OSCAL 500) 1250 (500 CA) MG TABS tablet TAKE 1 TABLET BY MOUTH. THREE TIMES DAILY. 270 tablet 0     Multiple Vitamins-Minerals (CVS SPECTRAVITE ULTRA WOMEN) TABS TAKE 1 TABLET BY MOUTH DAILY 90 tablet 3     omeprazole (PRILOSEC) 20 MG CR capsule Take 1 capsule (20 mg) by mouth 2 times daily 180 capsule 3     blood glucose monitoring (NO BRAND SPECIFIED) meter device kit Use to test blood sugar one times daily or as directed. 1 kit 0     blood glucose monitoring (NO BRAND SPECIFIED) test strip Use to test blood sugars one times daily or as directed 100 strip 3     insulin pen needle (B-D U/F) 31G X 5 MM Use once daily or as directed. 100 each prn     order for DME Test strips for pt's glucometer, brand as covered by insurance. Test twice daily or PRN. 1 Box prn     LORazepam (ATIVAN) 1 MG tablet Take 1 mg by mouth At Bedtime        AMBIEN 10 MG OR TABS 1 TABLET DAILY AT BEDTIME       ASPIRIN NOT PRESCRIBED (INTENTIONAL) Please choose reason not prescribed, below 0 each 0       PAST SURGICAL HISTORY:  Past Surgical History:   Procedure Laterality Date     APPENDECTOMY       ARTHROSCOPY KNEE RT/LT       C  DELIVERY ONLY      x 2     C TOTAL KNEE ARTHROPLASTY      bilateral     COLONOSCOPY   2005, 2009, 2012    early Crohn's     HC COLP CERVIX/UPPER VAGINA W BX CERVIX  2011, 2013    neg     HC ESOPHAGOSCOPY, DIAGNOSTIC  2005, 2009, 2012    benign, fungal infection     HC REPAIR OF NASAL SEPTUM       HERNIA REPAIR, UMBILICAL       PE TUBES       TONSILLECTOMY & ADENOIDECTOMY       total knee Bilateral 2004    BRAD Mikael Prasad MD       ALLERGIES:     Allergies   Allergen Reactions     Augmentin GI Disturbance     Victoza Other (See Comments)     Abdominal pain       FAMILY HISTORY:  - Premature coronary artery disease  - Atrial fibrillation  - Sudden cardiac death     SOCIAL HISTORY:  Social History   Substance Use Topics     Smoking status: Former Smoker     Packs/day: 1.50     Years: 13.00     Quit date: 1/1/1994     Smokeless tobacco: Never Used     Alcohol use No       ROS:   Constitutional: No fever, chills, or sweats. Weight stable.   ENT: No visual disturbance, ear ache, epistaxis, sore throat.   Cardiovascular: As per HPI.   Respiratory: No cough, hemoptysis.    GI: No nausea, vomiting, hematemesis, melena, or hematochezia.   : No hematuria.   Integument: Negative.   Psychiatric: Negative.   Hematologic:  Easy bruising, no easy bleeding.  Neuro: Negative.   Endocrinology: No significant heat or cold intolerance   Musculoskeletal: No myalgia.    Exam:  /82 (BP Location: Left arm, Patient Position: Chair, Cuff Size: Adult Large)  Pulse 63  Wt 130.2 kg (287 lb)  LMP 02/01/2010  SpO2 95%  BMI 47.39 kg/m2  GENERAL APPEARANCE: healthy, alert and no distress  HEENT: no icterus, no xanthelasmas, normal pupil size and reaction, normal palate, mucosa moist, no central cyanosis  NECK: no adenopathy, no asymmetry, masses, or scars, thyroid normal to palpation and no bruits, JVP not elevated  RESPIRATORY: lungs clear to auscultation - no rales, rhonchi or wheezes, no use of accessory muscles, no retractions, respirations are unlabored, normal respiratory rate  CARDIOVASCULAR: regular rhythm, normal  S1 with physiologic split S2, no S3 or S4 and no murmur, click or rub, precordium quiet with normal PMI.  ABDOMEN: soft, non tender, without hepatosplenomegaly, no masses palpable, bowel sounds normal, aorta not enlarged by palpation, no abdominal bruits  EXTREMITIES: peripheral pulses normal, no edema, no bruits  NEURO: alert and oriented to person/place/time, normal speech, gait and affect  VASC: Radial, femoral, dorsalis pedis and posterior tibialis pulses are normal in volumes and symmetric bilaterally. No bruits are heard.  SKIN: no ecchymoses, no rashes    Labs:  CBC RESULTS:   Lab Results   Component Value Date    WBC 5.6 2017    RBC 4.74 2017    HGB 14.8 2017    HCT 43.9 2017    MCV 93 2017    MCH 31.2 2017    MCHC 33.7 2017    RDW 13.4 2017     2017       BMP RESULTS:  Lab Results   Component Value Date     2017    POTASSIUM 4.4 2017    CHLORIDE 108 2017    CO2 33 (H) 2017    ANIONGAP 1 (L) 2017    GLC 94 2017    BUN 6 (L) 2017    CR 0.90 2017    GFRESTIMATED 64 10/05/2017    GFRESTBLACK 78 10/05/2017    CASSANDRA 9.6 2017        INR RESULTS:  Lab Results   Component Value Date    INR 0.93 2017       Procedures:      Assessment and Plan:     ATRIAL FIBRILLATION:  It is likely that the +2500 episodes of SVT were short runs of atrial fibrillation. Since she is symptomatic, we will attempt ablation (PVI). We discussed in detail with the patient management/treatment options for Shayy includin. Stroke Prophylaxis:  CHADSVASC=female +, HTN +, DM +  3, corresponding to a 3.2% annual stroke / systemic emolism event rate. indicating need for long term oral anticoagulation.   Continue dabigatran 150mg twice daily.    2. Rate Control: Continue metoprolol XL 100mg daily.   3. Rhythm Control: Continue flecainide 75 mg BID. We will move forward with afib ablation (PVI) as she is symptomatic. I  explained to patient that risks of EP study and ablation procedure include, but are not limited to vascular injury, excessive bleeding requiring blood transfusion, aortic injury, cardiac tamponade requiring pericardiocentesis or open heart surgery, TIA, stroke, esophageal injury, pulmonary vein stenosis or death. Patient understood the risks and decided to proceed with procedure.  4. Keep healthy cholesterol levels. Continue simvastatin.  5. Maintain BP control. Continue lisinopril.  6.  Regular moderate intensity exercise. Continue walking your dog 20-30 minutes/day as tolerated.  7. Maintain a healthy weight.  Referred to weight management program with Dr. Solis.  8. Limit caffeine and alcohol use.     9. KHUSHI, continue to use the CPAP.        FOLLOW UP: will move forward with PVI.      Patient expresses understanding and agreement with the plan.     Thank you for allowing me to care for this patient. This has been discussed with the attending physician.    Emliy Wyatt MD  Cardiology Fellow, PGY-4      I have seen, interviewed, and examined patient. I have reviewed the laboratory tests, imaging, and other investigations. I have reviewed the management plan with the patient. I discussed with the team and agree with the findings and plan in this resident/fellow/nurse practitioner's note. In addition, changes in the physical examination, assessment and plan have been incorporated into the note by myself, as to make it a single cohesive document.       Ira Pierre MD, MS  Cardiology/Cardiac EP Attending Staff        CC  Patient Care Team:  Vania Monroe APRN CNP as PCP - General (Nurse Practitioner - Family)

## 2017-12-11 NOTE — PATIENT INSTRUCTIONS
1.  Atrial fibrillation ablation to be scheduled at the [facility] ([address], [place] [address], [phone]) in January.    2.  You will need a CT of the heart and KEV (tranesophageal echo) the day prior to the ablation.  Both tests will be done at [facility] ([address], [place] [address], [phone])     3.  The , Deandra will call you with dates and times and instructions.              [facility] Cardiology [facility] Location    If you have any questions regarding to your visit please contact your care team:     Cardiology  Telephone Number   Jeannette Logan  Cardiology RN's.    Mavis Kaiser CMA [phone]    After hours: [phone].  [phone]   For scheduling appts:     [phone] or  [phone]    After hours: [phone]   For the Device Clinic (Pacemakers and ICD's)  RN's :  Heena Strange   During business hours: [phone]  After business hours:  [phone]- select option 4.      If you need a medication refill please contact your pharmacy.  Please allow 3 business days for your refill to be completed.    As always, Thank you for trusting us with your health care needs!  _____________________________________________________________________

## 2017-12-11 NOTE — MR AVS SNAPSHOT
After Visit Summary   12/11/2017    Gladys Singh    MRN: [id number]           Patient Information     Date Of Birth          1960        Visit Information        Provider Department      12/11/2017 1:40 PM Ira Pierre MD [facility] PHYSICIANS HEART AT [facility]        Today's Diagnoses     Paroxysmal atrial fibrillation (H)    -  1      Care Instructions    1.  Atrial fibrillation ablation to be scheduled at the [facility] ([address], [place] [address], [phone]) in January.    2.  You will need a CT of the heart and KEV (tranesophageal echo) the day prior to the ablation.  Both tests will be done at [facility] ([address], [place] [address], [phone])     3.  The , Deandra will call you with dates and times and instructions.              [facility] Cardiology [facility] Location    If you have any questions regarding to your visit please contact your care team:     Cardiology  Telephone Number   Jeannette Logan  Cardiology RN's.    Mavis Kaiser CMA [phone]    After hours: [phone].  [phone]   For scheduling appts:     [phone] or  [phone]    After hours: [phone]   For the Device Clinic (Pacemakers and ICD's)  RN's :  Heena Strange   During business hours: [phone]  After business hours:  [phone]- select option 4.      If you need a medication refill please contact your pharmacy.  Please allow 3 business days for your refill to be completed.    As always, Thank you for trusting us with your health care needs!  _____________________________________________________________________              Follow-ups after your visit        Your next 10 appointments already scheduled     Dec 13, 2017 10:15 AM CST   Diabetic Education with FK DIABETIC ED RESOURCE   [facility]    [address]  [place] [address]    [phone]            Dec 20, 2017 10:00 AM CST   FLORENTINO Extremity with Sha Leija, PT   [facility] ([facility])    [address]  [address]  [place] [address] [phone]            Dec 26, 2017 10:45 AM CST   SHORT with Octavia Solis MD   [facility] ([facility])    [address] [address] [phone]            Jan 05, 2018 10:00 AM CST   Nurse Only with HEALTH  - REGION 2   [facility] ([facility])    [address] [address] [phone]            Jan 22, 2018  8:30 AM CST   New Visit with Titus Wolf MD   [facility] ([facility])    [address] [address] [phone]              Future tests that were ordered for you today     Open Future Orders        Priority Expected Expires Ordered    EP Ablation/ EP Studies Routine 12/18/2017 3/18/2018 12/11/2017            Who to contact     If you have questions or need follow up information about today's clinic visit or your schedule please contact [facility] PHYSICIANS HEART AT [facility] directly at [phone].  Normal or non-critical lab and imaging results will be communicated to you by [employer], letter or phone within 4 business days after the clinic has received the results. If you do not hear from us within 7 days, please contact the clinic through [employer] or phone. If you have a critical or abnormal lab result, we will notify you by phone as soon as possible.  Submit refill requests through [employer] or call your pharmacy and they will forward the refill request to us. Please allow 3 business days for your refill to be completed.          Additional Information About Your Visit        [employer] Information     [employer] gives you secure access to your electronic health record. If you see a primary care provider, you can also send messages to your care  team and make appointments. If you have questions, please call your primary care clinic.  If you do not have a primary care provider, please call [phone] and they will assist you.        Care EveryWhere ID     This is your Care EveryWhere ID. This could be used by other organizations to access your [place] medical records  [id number]        Your Vitals Were     Pulse Last Period Pulse Oximetry BMI (Body Mass Index)          63 02/01/2010 95% 47.39 kg/m2         Blood Pressure from Last 3 Encounters:   12/11/17 127/82   11/22/17 122/86   11/03/17 118/70    Weight from Last 3 Encounters:   12/11/17 130.2 kg (287 lb)   11/22/17 128.4 kg (283 lb)   10/31/17 129.3 kg (285 lb)               Primary Care Provider Office Phone # Fax #    Vania Ramsey JACKELINE Monroe [facility] [phone]       [address] [address]        Equal Access to Services     CAYETANO URBANO : Hadii aad ku hadasho Soomaali, waaxda luqadaha, qaybta kaalmada adeegyada, waxay idiin hayaan isadoraeg kharash jos . So [facility] [phone].    ATENCIÓN: Si habla español, tiene a eason disposición servicios gratuitos de asistencia lingüística. Llame al [phone].    We comply with applicable federal civil rights laws and [place] laws. We do not discriminate on the basis of race, color, national origin, age, disability, sex, sexual orientation, or gender identity.            Thank you!     Thank you for choosing [facility] PHYSICIANS HEART AT [facility]  for your care. Our goal is always to provide you with excellent care. Hearing back from our patients is one way we can continue to improve our services. Please take a few minutes to complete the written survey that you may receive in the mail after your visit with us. Thank you!             Your Updated Medication List - Protect others around you: Learn how to safely use, store and throw away your medicines at www.disposemymeds.org.          This list is  accurate as of: 12/11/17  2:32 PM.  Always use your most recent med list.                   Brand Name Dispense Instructions for use Diagnosis    AMBIEN 10 MG tablet   Generic drug:  zolpidem      1 TABLET DAILY AT BEDTIME        ASPIRIN NOT PRESCRIBED    INTENTIONAL    0 each    Please choose reason not prescribed, below    Paroxysmal atrial fibrillation (H)       blood glucose monitoring lancets     1 Box    TEST ONCE DAILY OR AS NEEDED *DISPENSE WHATEVER IS COVERED BY INS*    Type 2 diabetes mellitus without complication, without long-term current use of insulin (H)       blood glucose monitoring meter device kit    no brand specified    1 kit    Use to test blood sugar one times daily or as directed.    Type 2 diabetes mellitus without complication, without long-term current use of insulin (H)       blood glucose monitoring test strip    no brand specified    100 strip    Use to test blood sugars one times daily or as directed    Type 2 diabetes mellitus without complication, without long-term current use of insulin (H)       BUSPAR PO      Take 30 mg by mouth 2 times daily        BYDUREON 2 MG pen   Generic drug:  exenatide ER      Inject 2 mg Subcutaneous every 7 days        calcium carbonate 1250 (500 CA) MG Tabs tablet    OSCAL 500    270 tablet    TAKE 1 TABLET BY MOUTH. THREE TIMES DAILY.    Elevated PTHrP level (H)       CVS SPECTRAVITE ULTRA WOMEN Tabs     90 tablet    TAKE 1 TABLET BY MOUTH DAILY    Constipation, chronic       cyanocobalamin 1000 MCG Tabs    CVS vitamin  B12    90 tablet    TAKE 1 TABLET (1,000 MCG) BY MOUTH DAILY    Vitamin B12 deficiency (non anemic)       dabigatran ANTICOAGULANT 150 MG capsule    PRADAXA    60 capsule    Take 1 capsule (150 mg) by mouth twice daily. Store in original 's bottle or blister pack; use within 120 days of opening.    Paroxysmal atrial fibrillation (H)       docusate sodium 100 MG capsule    COLACE    180 capsule    TAKE 1 CAPSULE (100 MG) BY  MOUTH 2 TIMES DAILY    Constipation, unspecified constipation type       escitalopram 20 MG tablet    LEXAPRO    90 tablet    Take 1 tablet (20 mg) by mouth daily    Paroxysmal atrial fibrillation (H)       Ferrous Sulfate 143 (45 FE) MG Tbcr    CVS SLOW RELEASE IRON    180 tablet    Take 1 tablet by mouth 2 times daily    Iron deficiency       flecainide 50 MG tablet    TAMBOCOR    90 tablet    Take 1.5 tablets (75 mg) by mouth 2 times daily    Atrial fibrillation (H)       furosemide 40 MG tablet    LASIX    90 tablet    TAKE 1 TABLET (40 MG) BY MOUTH DAILY    Pedal edema       insulin pen needle 31G X 5 MM    B-D U/F    100 each    Use once daily or as directed.    Type 2 diabetes mellitus without complication (H)       KLOR-CON 20 MEQ CR tablet   Generic drug:  potassium chloride SA     180 tablet    TAKE 1 TABLET (20 MEQ) BY MOUTH 2 TIMES DAILY    Diuresis       levothyroxine 75 MCG tablet    SYNTHROID/LEVOTHROID    60 tablet    TAKE 1 TABLET (75 MG) BY MOUTH EVERY DAY    Acquired hypothyroidism, Vitamin D deficiency, Hyperparathyroidism (H), Abdominal pain, epigastric       lisinopril 10 MG tablet    PRINIVIL/ZESTRIL    90 tablet    TAKE 1 TABLET (10 MG) BY MOUTH DAILY    Type 2 diabetes mellitus without complication, without long-term current use of insulin (H), Essential hypertension with goal blood pressure less than 140/90       LORazepam 1 MG tablet    ATIVAN     Take 1 mg by mouth At Bedtime        metoprolol 100 MG 24 hr tablet    TOPROL-XL    90 tablet    Take 1 tablet (100 mg) by mouth daily    Paroxysmal atrial fibrillation (H), Chest pain, unspecified type       omeprazole 20 MG CR capsule    priLOSEC    180 capsule    Take 1 capsule (20 mg) by mouth 2 times daily    Hiatal hernia       order for DME     1 Box    Test strips for pt's glucometer, brand as covered by insurance. Test twice daily or PRN.    Type 2 diabetes mellitus without complication (H)       ranitidine 150 MG tablet    ZANTAC    60  tablet    TAKE 1 TABLET (150 MG) BY MOUTH 2 TIMES DAILY    Gastroesophageal reflux disease, esophagitis presence not specified       simvastatin 20 MG tablet    ZOCOR    90 tablet    TAKE 1 TABLET (20 MG) BY MOUTH AT BEDTIME NEED LABS    Hyperlipidemia LDL goal <100       vitamin D 12846 UNIT capsule    ERGOCALCIFEROL    8 capsule    TAKE 1 CAPSULE BY MOUTH ONCE WEEKLY.    Vitamin D deficiency, Hyperparathyroidism (H), Acquired hypothyroidism, Abdominal pain, epigastric       Vitamin D3 3000 UNITS Tabs

## 2017-12-11 NOTE — NURSING NOTE
"Chief Complaint   Patient presents with     RECHECK     2 month follow up with Dr. Ira Pierre for paroxysmal Afib and SOB. Had Cardiac MRI with Stress test done 11-3-17. Started Flecainide 11/22/17 and Exercise Treadmill test on 11/29/17. Discuss recent Zio patch results.  States doing well, does still have the sob and occ chest pain but this has not changed. Does think the sob and fluttering has gotten better though. Able to be exercising more, but still putting on water weight. Thinks meds are working well just making her melow.        Initial /82 (BP Location: Left arm, Patient Position: Chair, Cuff Size: Adult Large)  Pulse 63  Wt 130.2 kg (287 lb)  LMP 02/01/2010  SpO2 95%  BMI 47.39 kg/m2 Estimated body mass index is 47.39 kg/(m^2) as calculated from the following:    Height as of 9/20/17: 1.657 m (5' 5.25\").    Weight as of this encounter: 130.2 kg (287 lb)..  BP completed using cuff size: noel Kaiser CMA    "

## 2017-12-11 NOTE — LETTER
"12/11/2017      RE: Gladys Singh  [address]  [place] [address]       Dear Colleague,    Thank you for the opportunity to participate in the care of your patient, Gladys Singh, at the [facility] HEART AT [facility] at [facility]. Please see a copy of my visit note below.    CC: palpitations    HPI:     Gladys Singh is a 57 year old female with a past medical history significant for hyperlipidemia, PSVT, PAF, essential HTN, hyperparathyroidism, depression, type 2 diabetes mellitus, anxiety, and KHUSHI.      Gladys Singh  s symptoms of palpitations started about 4-5 months ago and are characterized by \"fluttering\", \"squeezing\". Episodes last a couple of minutes to several hours. Symptoms are located upper left chest and do radiate to the middle of her chest. Nothing noted makes aggravates symptoms and nothing noted relieves symptoms.  Timing of symptoms are all times of the day, worse in the afternoon.  Severity is 9/10, but no pain with palpitations. Since her last appt, she states that the flecainide 75 mg BID has helped her decrease her palpitations and chest pain. She underwent a stress MRI before initiating the flecanide, and this was negative. She also underwent an exercise ekg which did not show QRS prolongation, and it was deemed safe to continue the flecainide.      Rhythm monitoring: ZIO patch 11/2017 showed PAF/AFlutter <1%, but with +2500 episodes of SVT. Afib - longest episode 1.5 minutes    PAST MEDICAL HISTORY:  Past Medical History:   Diagnosis Date     Anxiety     sees Dr. Chance     Arthritis involving multiple sites     knees, hip     ASCUS (atypical squamous cells of undetermined significance) on Pap smear 3/13/14     Benign neoplasm of stomach      BMI 40.0-44.9, adult (H)     sees Dr. Quan     Cervical high risk human papillomavirus (HPV) DNA test positive 2010     Colon " polyp 2005     Constipation, chronic      Crohn's disease (H) 2012     Depression     sees Dr. Elio Chance     Diverticulosis 11/1/2010     Fundic gland polyps of stomach, benign 11/1/2010     H/O colposcopy with cervical biopsy 9/13/13    no lesions seen.  repeat pap due in 6 months     Hiatal hernia 11/1/2010     High risk HPV infection 8/21/13    NIL pap/+ HR HPV (16)     Hyperlipidemia LDL goal <100      Hyperparathyroidism, unspecified     sees Dr Morris, Endocrine every 6 mos     Hypothyroidism     sees Dr. Morris -endo      Ileitis 9/19/12    ? crohn's     Iliotibial band friction syndrome of both knees      Ingrown toenail      Iron deficiency     on iron bid. had egd and colon per Dr. Sheppard.     KHUSHI (obstructive sleep apnea)     CPAP     Papanicolaou smear of cervix with atypical squamous cells of undetermined significance (ASC-US) 2011     Pedal edema     on lasix and potasium     Plantar fasciitis     resolved     Type II or unspecified type diabetes mellitus without mention of complication, not stated as uncontrolled     resolved     Vitamin D deficiency     sees Dr Morris, Endocrine every 6 mos       CURRENT MEDICATIONS:  Current Outpatient Prescriptions   Medication Sig Dispense Refill     flecainide (TAMBOCOR) 50 MG tablet Take 1.5 tablets (75 mg) by mouth 2 times daily 90 tablet 3     vitamin D (ERGOCALCIFEROL) 30218 UNIT capsule TAKE 1 CAPSULE BY MOUTH ONCE WEEKLY. 8 capsule 0     furosemide (LASIX) 40 MG tablet TAKE 1 TABLET (40 MG) BY MOUTH DAILY 90 tablet 2     docusate sodium (COLACE) 100 MG capsule TAKE 1 CAPSULE (100 MG) BY MOUTH 2 TIMES DAILY 180 capsule 2     lisinopril (PRINIVIL/ZESTRIL) 10 MG tablet TAKE 1 TABLET (10 MG) BY MOUTH DAILY 90 tablet 2     metoprolol (TOPROL-XL) 100 MG 24 hr tablet Take 1 tablet (100 mg) by mouth daily 90 tablet 3     levothyroxine (SYNTHROID/LEVOTHROID) 75 MCG tablet TAKE 1 TABLET (75 MG) BY MOUTH EVERY DAY 60 tablet 0     ranitidine (ZANTAC) 150 MG tablet TAKE  1 TABLET (150 MG) BY MOUTH 2 TIMES DAILY 60 tablet 5     simvastatin (ZOCOR) 20 MG tablet TAKE 1 TABLET (20 MG) BY MOUTH AT BEDTIME NEED LABS 90 tablet 1     cyanocobalamin (CVS VITAMIN  B12) 1000 MCG TABS TAKE 1 TABLET (1,000 MCG) BY MOUTH DAILY 90 tablet 1     Ferrous Sulfate (CVS SLOW RELEASE IRON) 143 (45 FE) MG TBCR Take 1 tablet by mouth 2 times daily 180 tablet 1     dabigatran ANTICOAGULANT (PRADAXA) 150 MG capsule Take 1 capsule (150 mg) by mouth twice daily. Store in original 's bottle or blister pack; use within 120 days of opening. 60 capsule 5     escitalopram (LEXAPRO) 20 MG tablet Take 1 tablet (20 mg) by mouth daily 90 tablet 1     BusPIRone HCl (BUSPAR PO) Take 30 mg by mouth 2 times daily       Cholecalciferol (VITAMIN D3) 3000 UNITS TABS        exenatide ER (BYDUREON) 2 MG pen Inject 2 mg Subcutaneous every 7 days       POTASSIUM CHLORIDE 20 MEQ CR tablet TAKE 1 TABLET (20 MEQ) BY MOUTH 2 TIMES DAILY 180 tablet 0     blood glucose monitoring (Motion ComputingET) lancets TEST ONCE DAILY OR AS NEEDED *DISPENSE WHATEVER IS COVERED BY INS* 1 Box 3     calcium carbonate (OSCAL 500) 1250 (500 CA) MG TABS tablet TAKE 1 TABLET BY MOUTH. THREE TIMES DAILY. 270 tablet 0     Multiple Vitamins-Minerals (CVS SPECTRAVITE ULTRA WOMEN) TABS TAKE 1 TABLET BY MOUTH DAILY 90 tablet 3     omeprazole (PRILOSEC) 20 MG CR capsule Take 1 capsule (20 mg) by mouth 2 times daily 180 capsule 3     blood glucose monitoring (NO BRAND SPECIFIED) meter device kit Use to test blood sugar one times daily or as directed. 1 kit 0     blood glucose monitoring (NO BRAND SPECIFIED) test strip Use to test blood sugars one times daily or as directed 100 strip 3     insulin pen needle (B-D U/F) 31G X 5 MM Use once daily or as directed. 100 each prn     order for DME Test strips for pt's glucometer, brand as covered by insurance. Test twice daily or PRN. 1 Box prn     LORazepam (ATIVAN) 1 MG tablet Take 1 mg by mouth At Bedtime         AMBIEN 10 MG OR TABS 1 TABLET DAILY AT BEDTIME       ASPIRIN NOT PRESCRIBED (INTENTIONAL) Please choose reason not prescribed, below 0 each 0       PAST SURGICAL HISTORY:  Past Surgical History:   Procedure Laterality Date     APPENDECTOMY       ARTHROSCOPY KNEE RT/LT       C  DELIVERY ONLY      x 2     C TOTAL KNEE ARTHROPLASTY  2004    bilateral     COLONOSCOPY  , ,     early Crohn's     HC COLP CERVIX/UPPER VAGINA W BX CERVIX  ,     neg     HC ESOPHAGOSCOPY, DIAGNOSTIC  , ,     benign, fungal infection     HC REPAIR OF NASAL SEPTUM       HERNIA REPAIR, UMBILICAL       PE TUBES       TONSILLECTOMY & ADENOIDECTOMY       total knee Bilateral 2004    BRAD Mikael Prasad MD       ALLERGIES:     Allergies   Allergen Reactions     Augmentin GI Disturbance     Victoza Other (See Comments)     Abdominal pain       FAMILY HISTORY:  - Premature coronary artery disease  - Atrial fibrillation  - Sudden cardiac death     SOCIAL HISTORY:  Social History   Substance Use Topics     Smoking status: Former Smoker     Packs/day: 1.50     Years: 13.00     Quit date: 1994     Smokeless tobacco: Never Used     Alcohol use No       ROS:   Constitutional: No fever, chills, or sweats. Weight stable.   ENT: No visual disturbance, ear ache, epistaxis, sore throat.   Cardiovascular: As per HPI.   Respiratory: No cough, hemoptysis.    GI: No nausea, vomiting, hematemesis, melena, or hematochezia.   : No hematuria.   Integument: Negative.   Psychiatric: Negative.   Hematologic:  Easy bruising, no easy bleeding.  Neuro: Negative.   Endocrinology: No significant heat or cold intolerance   Musculoskeletal: No myalgia.    Exam:  /82 (BP Location: Left arm, Patient Position: Chair, Cuff Size: Adult Large)  Pulse 63  Wt 130.2 kg (287 lb)  LMP 2010  SpO2 95%  BMI 47.39 kg/m2  GENERAL APPEARANCE: healthy, alert and no distress  HEENT: no icterus, no xanthelasmas, normal pupil size and  reaction, normal palate, mucosa moist, no central cyanosis  NECK: no adenopathy, no asymmetry, masses, or scars, thyroid normal to palpation and no bruits, JVP not elevated  RESPIRATORY: lungs clear to auscultation - no rales, rhonchi or wheezes, no use of accessory muscles, no retractions, respirations are unlabored, normal respiratory rate  CARDIOVASCULAR: regular rhythm, normal S1 with physiologic split S2, no S3 or S4 and no murmur, click or rub, precordium quiet with normal PMI.  ABDOMEN: soft, non tender, without hepatosplenomegaly, no masses palpable, bowel sounds normal, aorta not enlarged by palpation, no abdominal bruits  EXTREMITIES: peripheral pulses normal, no edema, no bruits  NEURO: alert and oriented to person/place/time, normal speech, gait and affect  VASC: Radial, femoral, dorsalis pedis and posterior tibialis pulses are normal in volumes and symmetric bilaterally. No bruits are heard.  SKIN: no ecchymoses, no rashes    Labs:  CBC RESULTS:   Lab Results   Component Value Date    WBC 5.6 2017    RBC 4.74 2017    HGB 14.8 2017    HCT 43.9 2017    MCV 93 2017    MCH 31.2 2017    MCHC 33.7 2017    RDW 13.4 2017     2017       BMP RESULTS:  Lab Results   Component Value Date     2017    POTASSIUM 4.4 2017    CHLORIDE 108 2017    CO2 33 (H) 2017    ANIONGAP 1 (L) 2017    GLC 94 2017    BUN 6 (L) 2017    CR 0.90 2017    GFRESTIMATED 64 10/05/2017    GFRESTBLACK 78 10/05/2017    CASSANDRA 9.6 2017        INR RESULTS:  Lab Results   Component Value Date    INR 0.93 2017       Procedures:      Assessment and Plan:     ATRIAL FIBRILLATION:  It is likely that the +2500 episodes of SVT were short runs of atrial fibrillation. Since she is symptomatic, we will attempt ablation (PVI). We discussed in detail with the patient management/treatment options for Shayy includin. Stroke  Prophylaxis:  CHADSVASC=female +, HTN +, DM +  3, corresponding to a 3.2% annual stroke / systemic emolism event rate. indicating need for long term oral anticoagulation.   Continue dabigatran 150mg twice daily.    2. Rate Control: Continue metoprolol XL 100mg daily.   3. Rhythm Control: Continue flecainide 75 mg BID. We will move forward with afib ablation (PVI) as she is symptomatic. I explained to patient that risks of EP study and ablation procedure include, but are not limited to vascular injury, excessive bleeding requiring blood transfusion, aortic injury, cardiac tamponade requiring pericardiocentesis or open heart surgery, TIA, stroke, esophageal injury, pulmonary vein stenosis or death. Patient understood the risks and decided to proceed with procedure.  4. Keep healthy cholesterol levels. Continue simvastatin.  5. Maintain BP control. Continue lisinopril.  6.  Regular moderate intensity exercise. Continue walking your dog 20-30 minutes/day as tolerated.  7. Maintain a healthy weight.  Referred to weight management program with Dr. Solis.  8. Limit caffeine and alcohol use.     9. KHUSHI, continue to use the CPAP.        FOLLOW UP: will move forward with PVI.      Patient expresses understanding and agreement with the plan.     Thank you for allowing me to care for this patient. This has been discussed with the attending physician.    Emily Wyatt MD  Cardiology Fellow, PGY-4      I have seen, interviewed, and examined patient. I have reviewed the laboratory tests, imaging, and other investigations. I have reviewed the management plan with the patient. I discussed with the team and agree with the findings and plan in this resident/fellow/nurse practitioner's note. In addition, changes in the physical examination, assessment and plan have been incorporated into the note by myself, as to make it a single cohesive document.       Ira Pierre MD, MS  Cardiology/Cardiac EP Attending Staff        CC  Patient Care  Team:  Vania Monroe APRN CNP as PCP - General (Nurse Practitioner - Family)

## 2017-12-12 RX ORDER — LIDOCAINE 40 MG/G
CREAM TOPICAL
Status: CANCELLED | OUTPATIENT
Start: 2017-12-12

## 2017-12-12 NOTE — NURSING NOTE
Med Reconcile: Reviewed and verified all current medications with the patient. The updated medication list was printed and given to the patient.  EP Procedure: Patient given instructions regarding  ablation Discussed purpose, preparation, and procedure with the patient. Patient demonstrated understanding of this information and agreed to call with further questions or concerns.  Patient stated she understood all health information given and agreed to call with further questions or concerns.  Marnie Lackey RN

## 2017-12-13 ENCOUNTER — ALLIED HEALTH/NURSE VISIT (OUTPATIENT)
Dept: NUTRITION | Facility: CLINIC | Age: 57
End: 2017-12-13
Payer: COMMERCIAL

## 2017-12-13 DIAGNOSIS — Z78.9 VEGETARIAN: ICD-10-CM

## 2017-12-13 DIAGNOSIS — E11.9 TYPE 2 DIABETES MELLITUS WITHOUT COMPLICATION, WITHOUT LONG-TERM CURRENT USE OF INSULIN (H): ICD-10-CM

## 2017-12-13 DIAGNOSIS — E66.01 MORBID OBESITY DUE TO EXCESS CALORIES (H): Primary | ICD-10-CM

## 2017-12-13 DIAGNOSIS — I10 ESSENTIAL HYPERTENSION: ICD-10-CM

## 2017-12-13 DIAGNOSIS — K59.09 CONSTIPATION, CHRONIC: ICD-10-CM

## 2017-12-13 PROCEDURE — G0108 DIAB MANAGE TRN  PER INDIV: HCPCS

## 2017-12-13 NOTE — PATIENT INSTRUCTIONS
PB2- powdered peanut butter to add to yogurt/oatmeal    Add in more tofu with lunch and dinner    The good bean- snack chick peas use on salad or as a snack    Sprout beans and lentils at home- or purchase at the coop    Silken tofu    Recommended 1500 kcals  42%-Carbohydrates -158g per day  29%-Protein- 110g per day  29%Fat- 47g per day      https://vegetariannutrition.net/  http://www.OrthoPediactricsrd.FirstJob/  http://plantApaja.com/  Books:  The Vegiterranean diet  Vegan on the Cheap    Breakfast- 1 pc bread/crackers, peanut butter, Nut milk mixed with (spinach, protein powder, 1/2 banana or other fruit)  Snack- hummus with veggies  Lunch- use plate method, add in beans  Snack- nuts with 1 piece fruit  Dinner- Example vegan pizza, side salad, side of beans      [facility] for the [facility] Area:  For Your Diabetes Education and Nutrition Appointments Call:  [phone]   For Diabetes Education or Nutrition Related Questions:   Phone: [phone]  E-mail: [email or web address]  Fax: [phone]   If you need a medication refill please contact your pharmacy. Please allow 3 business days for your refills to be completed.    Instructions for emailing the Diabetes Educators    If you need to communicate a non-urgent message to a Diabetes Educator via email, please send to [email or web address].    Please follow the following email guidelines:    Subject line: Secure: your clinic name (example: Secure: [facility])  In the email please include: First name, middle initial, last name and date of birth.    We will be in touch with you within one (1) business day.

## 2017-12-13 NOTE — PROGRESS NOTES
Diabetes Self Management Training: Initial Assessment Visit for Type 2 diabetes:    Gladys Singh presents today for education, evaluation of glucose control and nutrition/weight loss related to Type 2 diabetes.    She is accompanied by self    Patient's diabetes management related comments/concerns: things portions are too big and eats too many carbs    Patient's emotional response to diabetes: expresses readiness to learn and concern for health and well-being    Patient would like this visit to be focused around the following diabetes-related behaviors and goals: Healthy Eating    ASSESSMENT:  Patient Problem List and Family Medical History reviewed for relevant medical history, current medical status, and diabetes risk factors.    Patient has high intake of carbohydrates and processed vegan foods.  Eats limited beans, nuts, and seeds which are the main sources of protein for the Vegan diet.  Patient also has limited intake of fruits and vegetables, often opting for juice vs. Whole fruit.  High intake of vegan food products often also results in higher sodium intake which would impact her blood pressure.    Current Diabetes Management per Patient:  Taking diabetes medications?   yes:     Diabetes Medication(s)     Incretin Mimetic Agents (GLP-1 Receptor Agonists) Sig    exenatide ER (BYDUREON) 2 MG pen Inject 2 mg Subcutaneous every 7 days          *Abbreviated insulin dose documentation key: Insulin Trade Name (giwlovmpr-vtzue-oyhcwy-bedtime) - i.e. Humalog 5-5-5-0 (Humalog 5 units at breakfast, 5 units at lunch, and 5 units at dinner).    Past Diabetes Education: Yes    Patient glucose self monitoring as follows: one time daily.   BG meter: One Touch Ultra meter  BG results: 129-133 fasting    BG values are: In goal  Patient's most recent   Lab Results   Component Value Date    A1C 6.0 12/06/2016    is meeting goal of <7.0    Nutrition:  Patient eats 3 meals per day, has a high intake of carbohydrates  "and limited protein intake until recently.  Has been trying to eat 20 mg protein with each meal.    David Gonzalez,   it's Gladys Singh.  We met this morning.  Here are my notes :    Dec 11, 2017  Blood sugar   breakfast -1 slice of italian bread, vegainaisse, 2 slices of vegan cheese, 2 cups of coffee, water  mid morning-1 cup of almiond milk with 2 scoops of protein powder  Lunch-lentil soup  Afternoon-1 glass of orange juice  Dinner-pasta, gravy, mushrooms, 1/2 glass of Pepsi    Dec 12, 2017  Blood sugar   Breakfast-pasta, gravy and mushrooms, coffee, water  Midmorning-1 apple  Lunch-vegan ribs, orange juice  dinner -Daiya vegan pizza with vegetables and vegan crumbles, water    Dec 13, 2017  Blood sugar   Breakfast-2 slices of artisan bread, earth balance spread, 2 messi slices, coffee    Last vegetable- some on pizza last night (mushrooms), Saturday big salad   Last fruit- daily (apple or banana)    Last time beans- lentil soup 2 days ago, eats vegetarian chili from a can.    Eats Amys meals 3-4 times per week.    Patient does main cooking for self no one else to cook for.    Beverages: Juice 1.5 cups/day and Coffee 1/day    Cultural/Hinduism diet restrictions: vegan      Biggest Challenge to Healthy Eating: not enough protein, too many carbohydrates        Physical Activity:    Doing exercises 4 days per week for 20-30 minutes at a RPE of 3      Vitals:  LMP 02/01/2010  Estimated body mass index is 47.39 kg/(m^2) as calculated from the following:    Height as of 9/20/17: 1.657 m (5' 5.25\").    Weight as of 12/11/17: 130.2 kg (287 lb).   Last 3 BP:   BP Readings from Last 3 Encounters:   12/11/17 127/82   11/22/17 122/86   11/03/17 118/70     Wt Readings from Last 10 Encounters:   12/11/17 130.2 kg (287 lb)   11/22/17 128.4 kg (283 lb)   10/31/17 129.3 kg (285 lb)   09/20/17 129.7 kg (286 lb)   09/08/17 129.3 kg (285 lb)   09/01/17 128.5 kg (283 lb 6.4 oz)   08/18/17 129.7 kg (286 lb)   03/22/17 " 127.8 kg (281 lb 12.8 oz)   02/10/17 124.7 kg (275 lb)   01/30/17 126 kg (277 lb 12.8 oz)       History   Smoking Status     Former Smoker     Packs/day: 1.50     Years: 13.00     Quit date: 1/1/1994   Smokeless Tobacco     Never Used       Labs:  Lab Results   Component Value Date    A1C 6.0 12/06/2016    A1C 5.8 08/09/2016    A1C 6.4 04/07/2016    A1C 5.9 06/15/2015    A1C 5.9 02/27/2015       Lab Results   Component Value Date    GLC 94 08/18/2017     Lab Results   Component Value Date    LDL 85 09/01/2017    LDL 77 07/07/2016     HDL Cholesterol   Date Value Ref Range Status   07/07/2016 39 (L) >49 mg/dL Final   ]  GFR Estimate   Date Value Ref Range Status   10/05/2017 64 >60 mL/min/1.7m2 Final     GFR Estimate If Black   Date Value Ref Range Status   10/05/2017 78 >60 mL/min/1.7m2 Final     Lab Results   Component Value Date    CR 0.90 08/18/2017     No results found for: MICROALBUMIN    Socio/Economic Considerations:    Support system: has college aged daughter (she eats meat)    Health Beliefs and Attitudes:   Patient Activation Measure Survey Score:  KO Score (Last Two) 7/14/2016 12/8/2017   KO Raw Score 33 32   Activation Score 41.7 62.6   KO Level 1 3       Stage of Change: ACTION (Actively working towards change)      Diabetes knowledge and skills assessment:     Patient is knowledgeable in diabetes management concepts related to: Monitoring and Taking Medication    Patient needs further education on the following diabetes management concepts: Healthy Eating and Being Active    Barriers to Learning Assessment: No Barriers identified    Based on learning assessment above, most appropriate setting for further diabetes education would be: Individual setting.    INTERVENTION:     REE- 1889 kcals per day with 1.1 activity factor -> 87940    For weight loss=1578    Recommended 1500 kcals  42%-Carbohydrates -158g per day  29%-Protein- 110g per day  29%Fat- 47g per day      Education provided today on:  AADE  Self-Care Behaviors:  Healthy Eating: carbohydrate counting, portion control, plate planning method, label reading and vegan sources of protein.  Used vegan shopping list to record protein serving sizes.  Discussed and researched different protein foods and how to incorporate them into her eating pattern.  Used plate method and discussed carbohydrate content of protein foods such as beans and how that will impact plate.  Reviewed ways to add in sole protein sources such as powdered peanut butter and protein powder to allow for added grams of protein with out as many additional grams of fat and calories.  Discussed impact of added beans, nuts, and vegetables on bowel patterns.      Opportunities for ongoing education and support in diabetes-self management were discussed.    Pt verbalized understanding of concepts discussed and recommendations provided today.       Education Materials Provided:  Vegan shopping list  Plate planner    PLAN:  See Patient Instructions for co-developed, patient-stated behavior change goals.  AVS printed and provided to patient today.    FOLLOW-UP:  Chart routed to referring provider.  Recommended patient follow up in 4-8 weeks for further diet evaluation.  Also recommended vegan RD on [place] team if she would like more detailed information.  Patient wishes to wait until after ablation.    Ongoing plan for education and support: Written resources (magazines, books, etc.).  Provided vegan and vegetarian book titles and websites for further recipe ideas.    Lisa Lira MS, RD, LD, CDE    Time Spent: 60 minutes  Encounter Type: Individual    Any diabetes medication dose changes were made via the CDE Protocol and Collaborative Practice Agreement with the patient's referring provider. A copy of this encounter was shared with the provider.

## 2017-12-13 NOTE — MR AVS SNAPSHOT
After Visit Summary   12/13/2017    Gladys Singh    MRN: [id number]           Patient Information     Date Of Birth          1960        Visit Information        Provider Department      12/13/2017 10:15 AM  NUTR[facility]OURCE [facility]        Care Instructions    PB2- powdered peanut butter to add to yogurt/oatmeal    Add in more tofu with lunch and dinner    The good bean- snack chick peas use on salad or as a snack    Sprout beans and lentils at home- or purchase at the coop    Silken tofu    Recommended 1500 kcals  42%-Carbohydrates -158g per day  29%-Protein- 110g per day  29%Fat- 47g per day      https://vegetariannutrition.net/  http://www.Smish.Post-i/  http://Campanda.Post-i/  Books:  The Vegiterranean diet  Vegan on the Cheap    Breakfast- 1 pc bread/crackers, peanut butter, Nut milk mixed with (spinach, protein powder, 1/2 banana or other fruit)  Snack- hummus with veggies  Lunch- use plate method, add in beans  Snack- nuts with 1 piece fruit  Dinner- Example vegan pizza, side salad, side of beans      [facility] for the [facility]:  For Your Diabetes Education and Nutrition Appointments Call:  [phone]   For Diabetes Education or Nutrition Related Questions:   Phone: [phone]  E-mail: [email or web address]  Fax: [phone]   If you need a medication refill please contact your pharmacy. Please allow 3 business days for your refills to be completed.    Instructions for emailing the Diabetes Educators    If you need to communicate a non-urgent message to a Diabetes Educator via email, please send to [email or web address].    Please follow the following email guidelines:    Subject line: Secure: your clinic name (example: Secure: [facility])  In the email please include: First name, middle initial, last name and date of birth.    We will be in touch with you within one (1) business  day.            Follow-ups after your visit        Your next 10 appointments already scheduled     Dec 20, 2017 10:00 AM CST   [facility]ORENTINO Extremity with Sha Leija, PT   [facility] ([facility]    [address] [place] [address] [address]  [facility] [address]   [phone]            Dec 26, 2017 10:45 AM CST   [facility]is [facility]   [facility]    [address] [place] [id number] [phone]            Jan 05, 2018 10:00 AM CST   Nurse Only with HEALTH  - REGION 2   [facility]    [address] [place] [id number] [phone]            Jan 22, 2018  8:30 AM CST   New Visit with Titus Wolf MD   [facility]    [address] [facility] [id number] [phone]              Who to contact     If you have questions or need follow up information about today's clinic visit or your schedule please contact [facility] directly at [phone].  Normal or non-critical lab and imaging results will be communicated to you by [email or web address], letter or phone within 4 business days after the clinic has received the results. If you do not hear from us within 7 days, please contact the clinic through Suite101hart or phone. If you have a critical or abnormal lab result, we will notify you by phone as soon as possible.  Submit refill requests through "[employer] ([email or web address])" or call your pharmacy and they will forward the refill request to us. Please allow 3 business days for your refill to be completed.          Additional Information About Your Visit        "[email or web address] Information     "[email or web address])" gives you secure access to your electronic health record. If you see a primary care provider, you can also send messages to your care team and make appointments. If you have questions, please call your primary care clinic.  If you do not have a primary care provider, please call  [phone] and they will assist you.        Care EveryWhere ID     This is your Care EveryWhere ID. This could be used by other organizations to access your [facility] medical records  [id number]        Your Vitals Were     Last Period                   02/01/2010            Blood Pressure from Last 3 Encounters:   12/11/17 127/82   11/22/17 122/86   11/03/17 118/70    Weight from Last 3 Encounters:   12/11/17 130.2 kg (287 lb)   11/22/17 128.4 kg (283 lb)   10/31/17 129.3 kg (285 lb)              Today, you had the following     No orders found for display       Primary Care Provider Office Phone # Fax #    Vania Ramsey JACKELINE [facility] [phone] [address] [facility] [address]        Equal Access to Services     [facility] : Hadii aad ku hadasho Soomaali, waaxda luqadaha, qaybta kaalmada adeegyada, chelsie jacobsin hayaan neelima arguello . So [facility] [phone].    ATENCIÓN: Si habla español, tiene a eason disposición servicios gratuitos de asistencia lingüística. Cathryn al [phone].    We comply with applicable federal civil rights laws and [place] laws. We do not discriminate on the basis of race, color, national origin, age, disability, sex, sexual orientation, or gender identity.            Thank you!     Thank you for choosing [facility]  for your care. Our goal is always to provide you with excellent care. Hearing back from our patients is one way we can continue to improve our services. Please take a few minutes to complete the written survey that you may receive in the mail after your visit with us. Thank you!             Your Updated Medication List - Protect others around you: Learn how to safely use, store and throw away your medicines at www.disposemymeds.org.          This list is accurate as of: 12/13/17 11:02 AM.  Always use your most recent med list.                   Brand Name Dispense Instructions for use Diagnosis    AMBIEN 10 MG tablet    Generic drug:  zolpidem      1 TABLET DAILY AT BEDTIME        ASPIRIN NOT PRESCRIBED    INTENTIONAL    0 each    Please choose reason not prescribed, below    Paroxysmal atrial fibrillation (H)       blood glucose monitoring lancets     1 Box    TEST ONCE DAILY OR AS NEEDED *DISPENSE WHATEVER IS COVERED BY INS*    Type 2 diabetes mellitus without complication, without long-term current use of insulin (H)       blood glucose monitoring meter device kit    no brand specified    1 kit    Use to test blood sugar one times daily or as directed.    Type 2 diabetes mellitus without complication, without long-term current use of insulin (H)       blood glucose monitoring test strip    no brand specified    100 strip    Use to test blood sugars one times daily or as directed    Type 2 diabetes mellitus without complication, without long-term current use of insulin (H)       BUSPAR PO      Take 30 mg by mouth 2 times daily        BYDUREON 2 MG pen   Generic drug:  exenatide ER      Inject 2 mg Subcutaneous every 7 days        calcium carbonate 1250 (500 CA) MG Tabs tablet    OSCAL 500    270 tablet    TAKE 1 TABLET BY MOUTH. THREE TIMES DAILY.    Elevated PTHrP level (H)       CVS SPECTRAVITE ULTRA WOMEN Tabs     90 tablet    TAKE 1 TABLET BY MOUTH DAILY    Constipation, chronic       cyanocobalamin 1000 MCG Tabs    [facility]S vitamin  B12    90 tablet    TAKE 1 TABLET (1,000 MCG) BY MOUTH DAILY    Vitamin B12 deficiency (non anemic)       dabigatran ANTICOAGULANT 150 MG capsule    PRADAXA    60 capsule    Take 1 capsule (150 mg) by mouth twice daily. Store in original 's bottle or blister pack; use within 120 days of opening.    Paroxysmal atrial fibrillation (H)       docusate sodium 100 MG capsule    COLACE    180 capsule    TAKE 1 CAPSULE (100 MG) BY MOUTH 2 TIMES DAILY    Constipation, unspecified constipation type       escitalopram 20 MG tablet    LEXAPRO    90 tablet    Take 1 tablet (20 mg) by mouth daily     Paroxysmal atrial fibrillation (H)       Ferrous Sulfate 143 (45 FE) MG Tbcr    CVS SLOW RELEASE IRON    180 tablet    Take 1 tablet by mouth 2 times daily    Iron deficiency       flecainide 50 MG tablet    TAMBOCOR    90 tablet    Take 1.5 tablets (75 mg) by mouth 2 times daily    Atrial fibrillation (H)       furosemide 40 MG tablet    LASIX    90 tablet    TAKE 1 TABLET (40 MG) BY MOUTH DAILY    Pedal edema       insulin pen needle 31G X 5 MM    B-D U/F    100 each    Use once daily or as directed.    Type 2 diabetes mellitus without complication (H)       KLOR-CON 20 MEQ CR tablet   Generic drug:  potassium chloride SA     180 tablet    TAKE 1 TABLET (20 MEQ) BY MOUTH 2 TIMES DAILY    Diuresis       levothyroxine 75 MCG tablet    SYNTHROID/LEVOTHROID    60 tablet    TAKE 1 TABLET (75 MG) BY MOUTH EVERY DAY    Acquired hypothyroidism, Vitamin D deficiency, Hyperparathyroidism (H), Abdominal pain, epigastric       lisinopril 10 MG tablet    PRINIVIL/ZESTRIL    90 tablet    TAKE 1 TABLET (10 MG) BY MOUTH DAILY    Type 2 diabetes mellitus without complication, without long-term current use of insulin (H), Essential hypertension with goal blood pressure less than 140/90       LORazepam 1 MG tablet    ATIVAN     Take 1 mg by mouth At Bedtime        metoprolol 100 MG 24 hr tablet    TOPROL-XL    90 tablet    Take 1 tablet (100 mg) by mouth daily    Paroxysmal atrial fibrillation (H), Chest pain, unspecified type       omeprazole 20 MG CR capsule    priLOSEC    180 capsule    Take 1 capsule (20 mg) by mouth 2 times daily    Hiatal hernia       order for DME     1 Box    Test strips for pt's glucometer, brand as covered by insurance. Test twice daily or PRN.    Type 2 diabetes mellitus without complication (H)       ranitidine 150 MG tablet    ZANTAC    60 tablet    TAKE 1 TABLET (150 MG) BY MOUTH 2 TIMES DAILY    Gastroesophageal reflux disease, esophagitis presence not specified       simvastatin 20 MG tablet    ZOCOR     90 tablet    TAKE 1 TABLET (20 MG) BY MOUTH AT BEDTIME NEED LABS    Hyperlipidemia LDL goal <100       vitamin D 85526 UNIT capsule    ERGOCALCIFEROL    8 capsule    TAKE 1 CAPSULE BY MOUTH ONCE WEEKLY.    Vitamin D deficiency, Hyperparathyroidism (H), Acquired hypothyroidism, Abdominal pain, epigastric       Vitamin D3 3000 UNITS Tabs

## 2017-12-13 NOTE — Clinical Note
Patient had limited intake of beans, vegetables, and nuts.  Higher intake of processed vegan foods vs. Cooking.  Discussed protein options, offered Macarena Leija a [place] CDE who is vegetarian/vegan as additional option for further follow up.  Lisa Lira MS, RD, LD, CDE

## 2017-12-17 ENCOUNTER — NURSE TRIAGE (OUTPATIENT)
Dept: NURSING | Facility: CLINIC | Age: 57
End: 2017-12-17

## 2017-12-17 NOTE — TELEPHONE ENCOUNTER
"  [facility] Triage Call    Presenting Problem: Onset approximately 12/10/17 of continuous pain in ULQ, with increased pain in the afternoon.   \"it feels like a rock in there.\"  Last BM this morning.     Patient also described intermittent  (L) upper chest pain. Patient states pain is due to Arterial fibrillation.     Guideline Used: Abdominal Pain-Female                               Chest Pain - Adult.      Patient prefers to be seen at primary clinic tomorrow, but states she will to to ER if abdominal pain increases.         Reason for Disposition    [1] Chest pain lasting <= 5 minutes AND [2] NO chest pain or cardiac symptoms now(Exceptions: pains lasting a few seconds)    [1] MILD-MODERATE pain AND [2] constant AND [3] present > 2 hours    Additional Information    Negative: Shock suspected (e.g., cold/pale/clammy skin, too weak to stand, low BP, rapid pulse)    Negative: Difficult to awaken or acting confused  (e.g., disoriented, slurred speech)    Negative: Passed out (i.e., lost consciousness, collapsed and was not responding)    Negative: Sounds like a life-threatening emergency to the triager    Negative: Severe difficulty breathing (e.g., struggling for each breath, speaks in single words)    Negative: Difficult to awaken or acting confused (e.g., disoriented, slurred speech)    Negative: Shock suspected (e.g., cold/pale/clammy skin, too weak to stand, low BP, rapid pulse)    Negative: [1] Chest pain lasts > 5 minutes AND [2] history of heart disease  (i.e., heart attack, bypass surgery, angina, angioplasty, CHF; not just a heart murmur)    Negative: [1] Chest pain lasts > 5 minutes AND [2] described as crushing, pressure-like, or heavy    Negative: [1] Chest pain lasts > 5 minutes AND [2] age > 50    Negative: [1] Chest pain lasts > 5 minutes AND [2] age > 30 AND [3] at least one cardiac risk factor (i.e., hypertension, diabetes, obesity, smoker or strong family history of heart disease)    Negative: [1] Chest " "pain lasts > 5 minutes AND [2] not relieved with nitroglycerin    Negative: Passed out (i.e., lost consciousness, collapsed and was not responding)    Negative: Heart beating < 50 beats per minute OR > 140 beats per minute    Negative: Visible sweat on face or sweat dripping down face    Negative: Sounds like a life-threatening emergency to the triager    Negative: Followed a chest injury    Negative: SEVERE chest pain    Negative: [1] Intermittent  chest pain or \"angina\" AND [2] increasing in severity or frequency  (Exception: pains lasting a few seconds)    Negative: Pain also present in shoulder(s) or arm(s) or jaw  (Exception: pain is clearly made worse by movement)    Negative: Difficulty breathing    Negative: Dizziness or lightheadedness    Negative: Coughing up blood    Negative: Cocaine use within last 3 days    Negative: History of prior \"blood clot\" in leg or lungs (i.e., deep vein thrombosis, pulmonary embolism)    Negative: Recent illness requiring prolonged bedrest (i.e., immobilization)    Negative: Hip or leg fracture in past 2 months (e.g., had cast on leg or ankle)    Negative: Major surgery in the past month    Negative: Recent long-distance travel with prolonged time in car, bus, plane, or train (i.e., within past 2 weeks; 6 or  more hours duration)    Negative: Chest pain lasts > 5 minutes (Exceptions: chest pain occurring > 3 days ago and now asymptomatic; same as previously diagnosed heartburn and has accompanying sour taste in mouth)    Negative: Taking a deep breath makes pain worse    Negative: Patient sounds very sick or weak to the triager    Negative: [1] Chest pain lasts > 5 minutes AND [2] occurred > 3 days ago (72 hours) AND [3] NO chest pain or cardiac symptoms now    Negative: Chest pain     Patient states the pain in upper (L) chest is due to Aterial Fibulation.    Negative: Pain is mainly in upper abdomen  (if needed ask: \"is it mainly above the belly button?\")    Negative: " "Followed an abdomen (stomach) injury    Negative: [1] Abdominal pain AND [2] pregnant < 20 weeks    Negative: [1] Abdominal pain AND [2] pregnant > 20 weeks    Negative: [1] Abdominal pain AND [2] postpartum < 1 month since delivery    Negative: [1] SEVERE pain (e.g., excruciating) AND [2] present > 1 hour    Negative: [1] SEVERE pain AND [2] age > 60    Negative: [1] Vomiting AND [2] contains red blood or black (\"coffee ground\") material  (Exception: few red streaks in vomit that only happened once)    Negative: Blood in bowel movements   (Exception: blood on surface of BM with constipation)    Negative: Black or tarry bowel movements  (Exception: chronic-unchanged  black-grey bowel movements AND is taking iron pills or Pepto-bismol)    Negative: Patient sounds very sick or weak to the triager    Protocols used: CHEST PAIN-ADULT-AH, ABDOMINAL PAIN - FEMALE-ADULT-AH    "

## 2017-12-18 ENCOUNTER — TELEPHONE (OUTPATIENT)
Dept: FAMILY MEDICINE | Facility: CLINIC | Age: 57
End: 2017-12-18

## 2017-12-18 ENCOUNTER — OFFICE VISIT (OUTPATIENT)
Dept: FAMILY MEDICINE | Facility: CLINIC | Age: 57
End: 2017-12-18
Payer: COMMERCIAL

## 2017-12-18 ENCOUNTER — TELEPHONE (OUTPATIENT)
Dept: CARDIOLOGY | Facility: CLINIC | Age: 57
End: 2017-12-18

## 2017-12-18 VITALS
OXYGEN SATURATION: 95 % | SYSTOLIC BLOOD PRESSURE: 110 MMHG | WEIGHT: 293 LBS | HEIGHT: 65 IN | DIASTOLIC BLOOD PRESSURE: 60 MMHG | TEMPERATURE: 96.9 F | BODY MASS INDEX: 48.82 KG/M2 | HEART RATE: 66 BPM

## 2017-12-18 DIAGNOSIS — Z13.89 SCREENING FOR DIABETIC PERIPHERAL NEUROPATHY: ICD-10-CM

## 2017-12-18 DIAGNOSIS — E03.9 ACQUIRED HYPOTHYROIDISM: ICD-10-CM

## 2017-12-18 DIAGNOSIS — E11.9 TYPE 2 DIABETES MELLITUS WITHOUT COMPLICATION, WITHOUT LONG-TERM CURRENT USE OF INSULIN (H): ICD-10-CM

## 2017-12-18 DIAGNOSIS — K21.9 GASTROESOPHAGEAL REFLUX DISEASE, ESOPHAGITIS PRESENCE NOT SPECIFIED: ICD-10-CM

## 2017-12-18 DIAGNOSIS — K44.9 HIATAL HERNIA: ICD-10-CM

## 2017-12-18 DIAGNOSIS — R10.13 ABDOMINAL PAIN, EPIGASTRIC: ICD-10-CM

## 2017-12-18 DIAGNOSIS — K29.50 CHRONIC GASTRITIS WITHOUT BLEEDING, UNSPECIFIED GASTRITIS TYPE: Primary | ICD-10-CM

## 2017-12-18 DIAGNOSIS — I48.20 CHRONIC ATRIAL FIBRILLATION (H): ICD-10-CM

## 2017-12-18 DIAGNOSIS — E21.3 HYPERPARATHYROIDISM (H): ICD-10-CM

## 2017-12-18 DIAGNOSIS — E55.9 VITAMIN D DEFICIENCY: ICD-10-CM

## 2017-12-18 DIAGNOSIS — K29.50 CHRONIC GASTRITIS WITHOUT BLEEDING, UNSPECIFIED GASTRITIS TYPE: ICD-10-CM

## 2017-12-18 DIAGNOSIS — R35.89 DIURESIS: ICD-10-CM

## 2017-12-18 LAB
ALBUMIN SERPL-MCNC: 4 G/DL (ref 3.4–5)
ALP SERPL-CCNC: 113 U/L (ref 40–150)
ALT SERPL W P-5'-P-CCNC: 40 U/L (ref 0–50)
ANION GAP SERPL CALCULATED.3IONS-SCNC: 5 MMOL/L (ref 3–14)
AST SERPL W P-5'-P-CCNC: 24 U/L (ref 0–45)
BILIRUB SERPL-MCNC: 0.3 MG/DL (ref 0.2–1.3)
BUN SERPL-MCNC: 9 MG/DL (ref 7–30)
CALCIUM SERPL-MCNC: 9.5 MG/DL (ref 8.5–10.1)
CHLORIDE SERPL-SCNC: 104 MMOL/L (ref 94–109)
CO2 SERPL-SCNC: 33 MMOL/L (ref 20–32)
CREAT SERPL-MCNC: 0.86 MG/DL (ref 0.52–1.04)
GFR SERPL CREATININE-BSD FRML MDRD: 68 ML/MIN/1.7M2
GLUCOSE SERPL-MCNC: 102 MG/DL (ref 70–99)
LIPASE SERPL-CCNC: 244 U/L (ref 73–393)
POTASSIUM SERPL-SCNC: 4.4 MMOL/L (ref 3.4–5.3)
PROT SERPL-MCNC: 7.4 G/DL (ref 6.8–8.8)
SODIUM SERPL-SCNC: 142 MMOL/L (ref 133–144)

## 2017-12-18 PROCEDURE — 36415 COLL VENOUS BLD VENIPUNCTURE: CPT | Performed by: FAMILY MEDICINE

## 2017-12-18 PROCEDURE — 87338 HPYLORI STOOL AG IA: CPT | Performed by: FAMILY MEDICINE

## 2017-12-18 PROCEDURE — 80053 COMPREHEN METABOLIC PANEL: CPT | Performed by: FAMILY MEDICINE

## 2017-12-18 PROCEDURE — 83690 ASSAY OF LIPASE: CPT | Performed by: FAMILY MEDICINE

## 2017-12-18 PROCEDURE — 99213 OFFICE O/P EST LOW 20 MIN: CPT | Performed by: FAMILY MEDICINE

## 2017-12-18 RX ORDER — LEVOTHYROXINE SODIUM 75 UG/1
TABLET ORAL
Qty: 60 TABLET | Refills: 0 | Status: CANCELLED | OUTPATIENT
Start: 2017-12-18

## 2017-12-18 RX ORDER — POTASSIUM CHLORIDE 1500 MG/1
TABLET, EXTENDED RELEASE ORAL
Qty: 180 TABLET | Refills: 0 | Status: CANCELLED | OUTPATIENT
Start: 2017-12-18

## 2017-12-18 RX ORDER — POTASSIUM CHLORIDE 1500 MG/1
TABLET, EXTENDED RELEASE ORAL
Qty: 180 TABLET | Refills: 1 | Status: SHIPPED | OUTPATIENT
Start: 2017-12-18

## 2017-12-18 ASSESSMENT — ANXIETY QUESTIONNAIRES
2. NOT BEING ABLE TO STOP OR CONTROL WORRYING: MORE THAN HALF THE DAYS
IF YOU CHECKED OFF ANY PROBLEMS ON THIS QUESTIONNAIRE, HOW DIFFICULT HAVE THESE PROBLEMS MADE IT FOR YOU TO DO YOUR WORK, TAKE CARE OF THINGS AT HOME, OR GET ALONG WITH OTHER PEOPLE: SOMEWHAT DIFFICULT
GAD7 TOTAL SCORE: 10
3. WORRYING TOO MUCH ABOUT DIFFERENT THINGS: MORE THAN HALF THE DAYS
1. FEELING NERVOUS, ANXIOUS, OR ON EDGE: SEVERAL DAYS
7. FEELING AFRAID AS IF SOMETHING AWFUL MIGHT HAPPEN: MORE THAN HALF THE DAYS
5. BEING SO RESTLESS THAT IT IS HARD TO SIT STILL: NOT AT ALL
6. BECOMING EASILY ANNOYED OR IRRITABLE: SEVERAL DAYS

## 2017-12-18 ASSESSMENT — PATIENT HEALTH QUESTIONNAIRE - PHQ9
SUM OF ALL RESPONSES TO PHQ QUESTIONS 1-9: 4
5. POOR APPETITE OR OVEREATING: MORE THAN HALF THE DAYS

## 2017-12-18 NOTE — MR AVS SNAPSHOT
After Visit Summary   12/18/2017    Gladsy Singh    MRN: [id number]           Patient Information     Date Of Birth          1960        Visit Information        Provider Department      12/18/2017 9:00 AM Jim Wen MD [facility]        Today's Diagnoses     Screening for diabetic peripheral neuropathy    -  1    Acquired hypothyroidism        Vitamin D deficiency        Hyperparathyroidism        Abdominal pain, epigastric        Diuresis        Hiatal hernia        Type 2 diabetes mellitus without complication, without long-term current use of insulin (H)        Chronic gastritis without bleeding, unspecified gastritis type        Gastroesophageal reflux disease, esophagitis presence not specified        Chronic atrial fibrillation (H)          Care Instructions    [facility]    If you have any questions regarding to your visit please contact your care team:       Team Purple:   Clinic Hours Telephone Number   Dr. Cami Wen   7am-7pm  Monday - Thursday   7am-5pm  Fridays  [phone]  (Appointment scheduling available 24/7)    Questions about your Visit?   Team Line:  [phone]   Urgent Care - [facility] - 11am-9pm Monday-Friday Saturday-Sunday- 9am-5pm   [place] - 5pm-9pm Monday-Friday Saturday-Sunday- 9am-5pm  [phone] - [place]   [phone] - [place]       What options do I have for visits at the clinic other than the traditional office visit?  To expand how we care for you, many of our providers are utilizing electronic visits (e-visits) and telephone visits, when medically appropriate, for interactions with their patients rather than a visit in the clinic.   We also offer nurse visits for many medical concerns. Just like any other service, we will bill your insurance company for this type of visit based on time spent on  the phone with your provider. Not all insurance companies cover these visits. Please check with your medical insurance if this type of visit is covered. You will be responsible for any charges that are not paid by your insurance.      E-visits via [employer]:  generally incur a $35.00 fee.  Telephone visits:  Time spent on the phone: *charged based on time that is spent on the phone in increments of 10 minutes. Estimated cost:   5-10 mins $30.00   11-20 mins. $59.00   21-30 mins. $85.00     Use Viibar (secure email communication and access to your chart) to send your primary care provider a message or make an appointment. Ask someone on your Team how to sign up for Viibar.  For a Price Quote for your services, please call our [employer] Price Line at [phone].  As always, Thank you for trusting us with your health care needs!    Discharge by PRASHANT COREA             Follow-ups after your visit        Follow-up notes from your care team     Return in about 2 weeks (around 1/1/2018).      Your next 10 appointments already scheduled     Dec 26, 2017 10:45 AM CST   SHORT with Octavia Solis MD   [facility] ([address] [address]   [phone]            Jan 05, 2018 10:00 AM CST   Nurse Only with HEALTH  - REGION 2   [facility] ([address] [address]   [phone]            Jan 22, 2018  8:30 AM CST   New Visit with Titus Wolf MD   [facility] ([address] [address]   [phone]              Future tests that were ordered for you today     Open Future Orders        Priority Expected Expires Ordered    H Pylori antigen, stool Routine  1/17/2018 12/18/2017            Who to contact     If you have questions or need follow up information about today's clinic visit or your schedule please contact [facility]  "[place] directly at [phone].  Normal or non-critical lab and imaging results will be communicated to you by Process Relationshart, letter or phone within 4 business days after the clinic has received the results. If you do not hear from us within 7 days, please contact the clinic through Influit[employer]et or phone. If you have a critical or abnormal lab result, we will notify you by phone as soon as possible.  Submit refill requests through Ventiva or call your pharmacy and they will forward the refill request to us. Please allow 3 business days for your refill to be completed.          Additional Information About Your Visit        Process Relations[employer]Chef [employer]fing Information     [employer]iva gives you secure access to your electronic health record. If you see a primary care provider, you can also send messages to your care team and make appointments. If you have questions, please call your primary care clinic.  If you do not have a primary care provider, please call [phone] and they will assist you.        Care EveryWhere ID     This is your Care EveryWhere ID. This could be used by other organizations to access your [place] medical records  [id number]        Your Vitals Were     Pulse Temperature Height Last Period Pulse Oximetry BMI (Body Mass Index)    66 96.9  F (36.1  C) (Oral) 5' 5.25\" (1.657 m) 02/01/2010 95% 48.42 kg/m2       Blood Pressure from Last 3 Encounters:   12/18/17 110/60   12/11/17 127/82   11/22/17 122/86    Weight from Last 3 Encounters:   12/18/17 293 lb 3.2 oz (133 kg)   12/11/17 287 lb (130.2 kg)   11/22/17 283 lb (128.4 kg)              We Performed the Following     Comprehensive metabolic panel     Lipase          Today's Medication Changes          These changes are accurate as of: 12/18/17 10:35 AM.  If you have any questions, ask your nurse or doctor.               These medicines have changed or have updated prescriptions.        Dose/Directions    potassium chloride SA 20 MEQ CR tablet   Commonly known as:  KLOR-CON   This " may have changed:  See the new instructions.   Used for:  Diuresis   Changed by:  Jim Stratton MD        TAKE 1 TABLET (20 MEQ) BY MOUTH 2 TIMES DAILY   Quantity:  180 tablet   Refills:  1            Where to get your medicines      These medications were sent to [facility]/pharmacy #[id number] - [place], [place] - [address] [address]     Phone:  [phone]     omeprazole 20 MG CR capsule    potassium chloride SA 20 MEQ CR tablet    ranitidine 150 MG tablet                Primary Care Provider Office Phone # Fax #    Vania Monroe, APRN [phone] [address] [facility] [address]        Equal Access to Services     CAYETANO [facility] Center[facility]ANJA : Hadii nemo ku hadasho Soomaali, waaxda luqadaha, qaybta kaalmada adeegyada, chelsie arguello . So [facility] [phone].    ATENCIÓN: Si habla español, tiene a eason disposición servicios gratuitos de asistencia lingüística. [facility] [phone].    We comply with applicable federal civil rights laws and [place] laws. We do not discriminate on the basis of race, color, national origin, age, disability, sex, sexual orientation, or gender identity.            Thank you!     Thank you for choosing [facility]  for your care. Our goal is always to provide you with excellent care. Hearing back from our patients is one way we can continue to improve our services. Please take a few minutes to complete the written survey that you may receive in the mail after your visit with us. Thank you!             Your Updated Medication List - Protect others around you: Learn how to safely use, store and throw away your medicines at www.disposemymeds.org.          This list is accurate as of: 12/18/17 10:35 AM.  Always use your most recent med list.                   Brand Name Dispense Instructions for use Diagnosis    AMBIEN 10 MG tablet   Generic drug:  zolpidem      1 TABLET DAILY  AT BEDTIME        ASPIRIN NOT PRESCRIBED    INTENTIONAL    0 each    Please choose reason not prescribed, below    Paroxysmal atrial fibrillation (H)       blood glucose monitoring lancets     1 Box    TEST ONCE DAILY OR AS NEEDED *DISPENSE WHATEVER IS COVERED BY INS*    Type 2 diabetes mellitus without complication, without long-term current use of insulin (H)       blood glucose monitoring meter device kit    no brand specified    1 kit    Use to test blood sugar one times daily or as directed.    Type 2 diabetes mellitus without complication, without long-term current use of insulin (H)       blood glucose monitoring test strip    no brand specified    100 strip    Use to test blood sugars one times daily or as directed    Type 2 diabetes mellitus without complication, without long-term current use of insulin (H)       BUSPAR PO      Take 30 mg by mouth 2 times daily        BYDUREON 2 MG pen   Generic drug:  exenatide ER      Inject 2 mg Subcutaneous every 7 days        calcium carbonate 500 MG tablet   Generic drug:  Oyster Shell Calcium     270 tablet    TAKE 1 TABLET BY MOUTH. THREE TIMES DAILY.    Elevated PTHrP level (H)       CVS SPECTRAVITE ULTRA WOMEN Tabs     90 tablet    TAKE 1 TABLET BY MOUTH DAILY    Constipation, chronic       cyanocobalamin 1000 MCG Tabs    CVS vitamin  B12    90 tablet    TAKE 1 TABLET (1,000 MCG) BY MOUTH DAILY    Vitamin B12 deficiency (non anemic)       dabigatran ANTICOAGULANT 150 MG capsule    PRADAXA    60 capsule    Take 1 capsule (150 mg) by mouth twice daily. Store in original 's bottle or blister pack; use within 120 days of opening.    Paroxysmal atrial fibrillation (H)       docusate sodium 100 MG capsule    COLACE    180 capsule    TAKE 1 CAPSULE (100 MG) BY MOUTH 2 TIMES DAILY    Constipation, unspecified constipation type       escitalopram 20 MG tablet    LEXAPRO    90 tablet    Take 1 tablet (20 mg) by mouth daily    Paroxysmal atrial fibrillation (H)        Ferrous Sulfate 143 (45 FE) MG Tbcr    CVS SLOW RELEASE IRON    180 tablet    Take 1 tablet by mouth 2 times daily    Iron deficiency       flecainide 50 MG tablet    TAMBOCOR    90 tablet    Take 1.5 tablets (75 mg) by mouth 2 times daily    Atrial fibrillation (H)       furosemide 40 MG tablet    LASIX    90 tablet    TAKE 1 TABLET (40 MG) BY MOUTH DAILY    Pedal edema       insulin pen needle 31G X 5 MM    B-D U/F    100 each    Use once daily or as directed.    Type 2 diabetes mellitus without complication (H)       levothyroxine 75 MCG tablet    SYNTHROID/LEVOTHROID    60 tablet    TAKE 1 TABLET (75 MG) BY MOUTH EVERY DAY    Acquired hypothyroidism, Vitamin D deficiency, Hyperparathyroidism (H), Abdominal pain, epigastric       lisinopril 10 MG tablet    PRINIVIL/ZESTRIL    90 tablet    TAKE 1 TABLET (10 MG) BY MOUTH DAILY    Type 2 diabetes mellitus without complication, without long-term current use of insulin (H), Essential hypertension with goal blood pressure less than 140/90       LORazepam 1 MG tablet    ATIVAN     Take 1 mg by mouth At Bedtime        metoprolol 100 MG 24 hr tablet    TOPROL-XL    90 tablet    Take 1 tablet (100 mg) by mouth daily    Paroxysmal atrial fibrillation (H), Chest pain, unspecified type       omeprazole 20 MG CR capsule    priLOSEC    180 capsule    Take 1 capsule (20 mg) by mouth 2 times daily    Hiatal hernia       order for DME     1 Box    Test strips for pt's glucometer, brand as covered by insurance. Test twice daily or PRN.    Type 2 diabetes mellitus without complication (H)       potassium chloride SA 20 MEQ CR tablet    KLOR-CON    180 tablet    TAKE 1 TABLET (20 MEQ) BY MOUTH 2 TIMES DAILY    Diuresis       ranitidine 150 MG tablet    ZANTAC    60 tablet    TAKE 1 TABLET (150 MG) BY MOUTH 2 TIMES DAILY    Gastroesophageal reflux disease, esophagitis presence not specified       simvastatin 20 MG tablet    ZOCOR    90 tablet    TAKE 1 TABLET (20 MG) BY MOUTH AT  BEDTIME NEED LABS    Hyperlipidemia LDL goal <100       vitamin D 99853 UNIT capsule    ERGOCALCIFEROL    8 capsule    TAKE 1 CAPSULE BY MOUTH ONCE WEEKLY.    Vitamin D deficiency, Hyperparathyroidism (H), Acquired hypothyroidism, Abdominal pain, epigastric       Vitamin D3 3000 UNITS Tabs

## 2017-12-18 NOTE — PATIENT INSTRUCTIONS
[facility]    If you have any questions regarding to your visit please contact your care team:       Team Purple:   Clinic Hours Telephone Number   Dr. Cami Wen   7am-7pm  Monday - Thursday   7am-5pm  Fridays  [phone]  (Appointment scheduling available 24/7)    Questions about your Visit?   Team Line:  [phone]   Ur[facility] - [facility] - 11am-9pm Monday-Friday Saturday-Sunday- 9am-5pm   [place] - 5pm-9pm Monday-Friday Saturday-Sunday- 9am-5pm  [phone] - [facility]  [phone] - [place]       What options do I have for visits at the clinic other than the traditional office visit?  To expand how we care for you, many of our providers are utilizing electronic visits (e-visits) and telephone visits, when medically appropriate, for interactions with their patients rather than a visit in the clinic.   We also offer nurse visits for many medical concerns. Just like any other service, we will bill your insurance company for this type of visit based on time spent on the phone with your provider. Not all insurance companies cover these visits. Please check with your medical insurance if this type of visit is covered. You will be responsible for any charges that are not paid by your insurance.      E-visits via [employer]:  generally incur a $35.00 fee.  Telephone visits:  Time spent on the phone: *charged based on time that is spent on the phone in increments of 10 minutes. Estimated cost:   5-10 mins $30.00   11-20 mins. $59.00   21-30 mins. $85.00     Use PoachIthart (secure email communication and access to your chart) to send your primary care provider a message or make an appointment. Ask someone on your Team how to sign up for [employer] [place].  For a Price Quote for your services, please call our Consumer Price Line at [phone].  As always, Thank you for trusting us with your health care  needs!    Discharge by PRASHANT COREA

## 2017-12-18 NOTE — TELEPHONE ENCOUNTER
Called patient and spoke with patient. Patient stated that she give me the phone number instead of a fax number for the JEREL. Informed patient that I found the fax number on the online.    Lynn Bedolla MA

## 2017-12-18 NOTE — PROGRESS NOTES
"  SUBJECTIVE:   Gladys Singh is a 57 year old female who presents to clinic today for the following health issues:      Abdominal Pain      Duration: 1 week    Description (location/character/radiation): upper left abdomen        Associated flank pain: None    Intensity:  mild    Accompanying signs and symptoms:        Fever/Chills: no        Gas/Bloating: YES- gassy and bloating        Nausea/vomitting: no        Diarrhea: no        Dysuria or Hematuria: no     History (previous similar pain/trauma/previous testing): none    Precipitating or alleviating factors:       Pain worse with eating/BM/urination: none       Pain relieved by BM: no     Therapies tried and outcome: None    LMP:  Postmenopausal     LUQ abdominal pain that started last Tuesday  Patient has history of hiatal hernia, takes half of prescribed antacids  Vegan diet, protein powder.  Feels gassy,no nausea or vomiting, no change in stool pattern.  Patient has h/o chronic afib and sees a cardiologist, considering ablation.    Problem list and histories reviewed & adjusted, as indicated.  Additional history: as documented    BP Readings from Last 3 Encounters:   12/18/17 110/60   12/11/17 127/82   11/22/17 122/86    Wt Readings from Last 3 Encounters:   12/18/17 293 lb 3.2 oz (133 kg)   12/11/17 287 lb (130.2 kg)   11/22/17 283 lb (128.4 kg)        Reviewed and updated as needed this visit by clinical staffTobacco  Allergies  Meds  Problems  Med Hx  Surg Hx  Fam Hx  Soc Hx        Reviewed and updated as needed this visit by Provider  Allergies  Meds  Problems         ROS:  Constitutional, HEENT, cardiovascular, pulmonary, gi and gu systems are negative, except as otherwise noted.    OBJECTIVE:   /60  Pulse 66  Temp 96.9  F (36.1  C) (Oral)  Ht 5' 5.25\" (1.657 m)  Wt 293 lb 3.2 oz (133 kg)  LMP 02/01/2010  SpO2 95%  BMI 48.42 kg/m2  Body mass index is 48.42 kg/(m^2).  GENERAL: healthy, alert and no distress  EYES: Eyes " grossly normal to inspection, PERRL and conjunctivae and sclerae normal  NECK: no adenopathy, no asymmetry, masses, or scars and thyroid normal to palpation  RESP: lungs clear to auscultation - no rales, rhonchi or wheezes  CV: regular rate and rhythm, normal S1 S2, no S3 or S4, no murmur, click or rub, no peripheral edema and peripheral pulses strong  ABDOMEN: soft, LUQ tenderness, no hepatosplenomegaly, no masses and bowel sounds normal  SKIN: no suspicious lesions or rashes  NEURO: Normal strength and tone, mentation intact and speech normal  PSYCH: mentation appears normal, affect normal/bright    ASSESSMENT/PLAN:     1. Chronic gastritis without bleeding, unspecified gastritis type  Pain is likely due to poorly controlled GERD 2/2 hiatal hernia, patient agreed to take antacids as prescribed (double her current dose).  Will consider imaging if symptoms progress, encouraged to contact GI doc as well.  - H Pylori antigen, stool; Future  - Lipase  - Comprehensive metabolic panel    2. Gastroesophageal reflux disease, esophagitis presence not specified  - ranitidine (ZANTAC) 150 MG tablet; TAKE 1 TABLET (150 MG) BY MOUTH 2 TIMES DAILY  Dispense: 60 tablet; Refill: 5    Follow up in 2 weeks.    Jim Wen MD  [facility]

## 2017-12-18 NOTE — NURSING NOTE
"Chief Complaint   Patient presents with     Abdominal Pain     upper left side of abdomen pain x 1 week       Initial /60  Pulse 66  Temp 96.9  F (36.1  C) (Oral)  Ht 5' 5.25\" (1.657 m)  Wt 293 lb 3.2 oz (133 kg)  LMP 02/01/2010  SpO2 95%  BMI 48.42 kg/m2 Estimated body mass index is 48.42 kg/(m^2) as calculated from the following:    Height as of this encounter: 5' 5.25\" (1.657 m).    Weight as of this encounter: 293 lb 3.2 oz (133 kg).  Medication Reconciliation: complete     An PRASHANT Bedolla    "

## 2017-12-18 NOTE — TELEPHONE ENCOUNTER
Patient called [facility] hotline, left message requesting to speak with An from Dr. Wen's office.  Patient can be reached at .

## 2017-12-19 LAB
H PYLORI AG STL QL IA: NORMAL
SPECIMEN SOURCE: NORMAL

## 2017-12-19 ASSESSMENT — ANXIETY QUESTIONNAIRES: GAD7 TOTAL SCORE: 10

## 2017-12-20 NOTE — PROGRESS NOTES
INTERNAL MEDICINE  Medical Weight Loss - Follow-up Visit    Chief Complaint:   Chief Complaint   Patient presents with     Weight Check     1 month follow-up     Wt Readings from Last 5 Encounters:   12/26/17 131.3 kg (289 lb 8 oz)   12/18/17 133 kg (293 lb 3.2 oz)   12/11/17 130.2 kg (287 lb)   11/22/17 128.4 kg (283 lb)   10/31/17 129.3 kg (285 lb)     HISTORY OF PRESENT ILLNESS (HPI):    Patient  returns today for medical weight loss follow-up visit. Patient was last seen by me on 11/22/2017 and has gained 6 pounds.     Update - She was supposed to increase her protein to 130g but began experiencing abdominal pain and thought the increase in protein caused it. She saw Dr. Stratton who increased her omeprazole and Zantac. Her abdomen is  but the pain is almost gone. Her bowel movements are still regular. At night, she experiences chest pain and has trouble breathing. She did find sources of protein with the dietitian but then she experienced the abdominal tenderness. She has increased protein but not to 130g.     Exercise - She is walking her dog once every day. With her afib, she will experience difficulty breathing and chest pain in the afternoon and into the evening. She has an ablation scheduled on 2/1/18. She is scared to do too much exercise before that. Has not done strength training, but she does have weights at home. Drake Leija gave her exercises to do and she plans to see him after her ablation. He gave her exercises to work on her hips and legs.     Diet - She says she does not feel like she is eating more. In the morning, she drinks 2 cups coffee and 1 cup water.     Patient is seeing dietitian  as scheduled  Patient is seeing PT  as scheduled     Today we reviewed patient's lab results for labs ordered at last visit and answered patient's questions regarding lab results.     MEDICATIONS:   Current Outpatient Prescriptions   Medication Sig Dispense Refill     potassium chloride SA  (KLOR-CON) 20 MEQ CR tablet TAKE 1 TABLET (20 MEQ) BY MOUTH 2 TIMES DAILY 180 tablet 1     ranitidine (ZANTAC) 150 MG tablet TAKE 1 TABLET (150 MG) BY MOUTH 2 TIMES DAILY 60 tablet 5     omeprazole (PRILOSEC) 20 MG CR capsule Take 1 capsule (20 mg) by mouth 2 times daily 180 capsule 3     CALCIUM CARBONATE 500 MG tablet TAKE 1 TABLET BY MOUTH. THREE TIMES DAILY. 270 tablet 1     flecainide (TAMBOCOR) 50 MG tablet Take 1.5 tablets (75 mg) by mouth 2 times daily 90 tablet 3     vitamin D (ERGOCALCIFEROL) 25626 UNIT capsule TAKE 1 CAPSULE BY MOUTH ONCE WEEKLY. 8 capsule 0     furosemide (LASIX) 40 MG tablet TAKE 1 TABLET (40 MG) BY MOUTH DAILY 90 tablet 2     docusate sodium (COLACE) 100 MG capsule TAKE 1 CAPSULE (100 MG) BY MOUTH 2 TIMES DAILY 180 capsule 2     lisinopril (PRINIVIL/ZESTRIL) 10 MG tablet TAKE 1 TABLET (10 MG) BY MOUTH DAILY 90 tablet 2     metoprolol (TOPROL-XL) 100 MG 24 hr tablet Take 1 tablet (100 mg) by mouth daily 90 tablet 3     levothyroxine (SYNTHROID/LEVOTHROID) 75 MCG tablet TAKE 1 TABLET (75 MG) BY MOUTH EVERY DAY 60 tablet 0     simvastatin (ZOCOR) 20 MG tablet TAKE 1 TABLET (20 MG) BY MOUTH AT BEDTIME NEED LABS 90 tablet 1     cyanocobalamin ([employer] VITAMIN  B12) 1000 MCG TABS TAKE 1 TABLET (1,000 MCG) BY MOUTH DAILY 90 tablet 1     Ferrous Sulfate ([employer]S SLOW RELEASE IRON) 143 (45 FE) MG TBCR Take 1 tablet by mouth 2 times daily 180 tablet 1     dabigatran ANTICOAGULANT (PRADAXA) 150 MG capsule Take 1 capsule (150 mg) by mouth twice daily. Store in original 's bottle or blister pack; use within 120 days of opening. 60 capsule 5     escitalopram (LEXAPRO) 20 MG tablet Take 1 tablet (20 mg) by mouth daily 90 tablet 1     BusPIRone HCl (BUSPAR PO) Take 30 mg by mouth 2 times daily       Cholecalciferol (VITAMIN D3) 3000 UNITS TABS        exenatide ER (BYDUREON) 2 MG pen Inject 2 mg Subcutaneous every 7 days       blood glucose monitoring (PortalariumET) lancets TEST ONCE DAILY OR AS  "NEEDED *DISPENSE WHATEVER IS COVERED BY INS* 1 Box 3     Multiple Vitamins-Minerals (CVS SPECTRAVITE ULTRA WOMEN) TABS TAKE 1 TABLET BY MOUTH DAILY 90 tablet 3     blood glucose monitoring (NO BRAND SPECIFIED) meter device kit Use to test blood sugar one times daily or as directed. 1 kit 0     blood glucose monitoring (NO BRAND SPECIFIED) test strip Use to test blood sugars one times daily or as directed 100 strip 3     insulin pen needle (B-D U/F) 31G X 5 MM Use once daily or as directed. 100 each prn     order for DME Test strips for pt's glucometer, brand as covered by insurance. Test twice daily or PRN. 1 Box prn     LORazepam (ATIVAN) 1 MG tablet Take 1 mg by mouth At Bedtime        AMBIEN 10 MG OR TABS 1 TABLET DAILY AT BEDTIME       ASPIRIN NOT PRESCRIBED (INTENTIONAL) Please choose reason not prescribed, below (Patient not taking: Reported on 12/26/2017) 0 each 0       ALLERGIES:   Allergies   Allergen Reactions     Augmentin GI Disturbance     Victoza Other (See Comments)     Abdominal pain     This document serves as a record of the services and decisions personally performed and made by Octavia Solsi MD. It was created on his/her behalf by Anna Brown, trained medical scribe. The creation of this document is based the provider's statements to the medical scribes.    Jane Brown 11:04 AM, December 26, 2017    PHYSICAL EXAMINATION:    VITALS: /80  Pulse 61  Temp 97  F (36.1  C) (Oral)  Ht 1.657 m (5' 5.24\")  Wt 131.3 kg (289 lb 8 oz)  LMP 02/01/2010  SpO2 95%  BMI 47.83 kg/m2  GENERAL: Patient is a 57 year old year old female  in no acute distress.  Patient is alert and orientated x 4, pleasant and cooperative with exam.     CARDIOVASCULAR:  Regular rate and rhythm without murmurs, rubs, or gallops.  PSYCH: mentation appears normal, affect normal/bright    LAB RESULTS:   Orders Only on 12/18/2017   Component Date Value Ref Range Status     Specimen Description 12/18/2017 Feces   Final     H " Pylori Antigen 12/18/2017    Final                    Value:Negative for Helicobacter pylori antigen by enzyme immunoassay. A negative result   indicates the absence of H. pylori antigen or that the level of antigen is below the level   of detection.         ASSESSMENT/PLAN:    1.  Morbid obesity (H)  Unfortunately she didn't make any of the changes we discussed at her initial visit.  She didn't eat more protein and less carbs for fear that her abdomen pain was related to protein intake.  She hasn't exercised either.  Stressed the importance of making the necessary changes in order to see the weight go down.  Explained that eating adequate protein at 0.8 g/kg would not cause abdomen pain.     2.  Type 2 diabetes mellitus without complication, without long-term current use of insulin (H)  The current medical regimen is effective;  continue present plan and medications. Needs to follow up with PCP for her blood sugars.      PLAN:    Patient is to return to the clinic in 8-12 Weeks. Patient is advised to call clinic if she experiences any adverse reaction(s).     ORDERS PLACED IN TODAY'S VISIT:  No orders of the defined types were placed in this encounter.      Patient Instructions     Get 100g of protein per day.    Try to do lower intensity exercises every day.    Follow up in February.        [facility]    If you have any questions regarding to your visit please contact your care team:     Team Pink:   Clinic Hours Telephone Number   Internal Medicine:  Dr. Octavia Monroe, NP       7am-7pm  Monday - Thursday   7am-5pm  Fridays  [phone]  (Appointment scheduling available 24/7)    Questions about your visit?  Team Line  [phone]   Urgent Care - [place] - 11am-9pm Monday-Friday Saturday-Sunday- 9am-5pm   [place] - 5pm-9pm Monday-Friday Saturday-Sunday- 9am-5pm  [phone] - [facility]  [phone] - [place]       What  options do I have for visits at the clinic other than the traditional office visit?  To expand how we care for you, many of our providers are utilizing electronic visits (e-visits) and telephone visits, when medically appropriate, for interactions with their patients rather than a visit in the clinic.   We also offer nurse visits for many medical concerns. Just like any other service, we will bill your insurance company for this type of visit based on time spent on the phone with your provider. Not all insurance companies cover these visits. Please check with your medical insurance if this type of visit is covered. You will be responsible for any charges that are not paid by your insurance.      E-visits via [employer]:  generally incur a $35.00 fee.  Telephone visits:  Time spent on the phone: *charged based on time that is spent on the phone in increments of 10 minutes. Estimated cost:   5-10 mins $30.00   11-20 mins. $59.00   21-30 mins. $85.00   Use [employer] (secure email communication and access to your chart) to send your primary care provider a message or make an appointment. Ask someone on your Team how to sign up for [employer].    For a Price Quote for your services, please call our Consumer Price Line at [phone].    As always, Thank you for trusting us with your health care needs!    Discharged by Caitlyn CHISHOLM CMA ([facility])      The information in this document, created by a scribe for me, accurately reflects the services I personally performed and the decisions made by me. I have reviewed and approved this document for accuracy.     I spent 12 minutes of time with the patient and >50% of it was in education and counseling regarding weight management.     Octavia Solis MD  Internal Medicine    American Board of Obesity Medicine Diplomate     Start 10:56 AM  End 11:08 AM

## 2017-12-26 ENCOUNTER — OFFICE VISIT (OUTPATIENT)
Dept: INTERNAL MEDICINE | Facility: CLINIC | Age: 57
End: 2017-12-26
Payer: COMMERCIAL

## 2017-12-26 VITALS
TEMPERATURE: 97 F | OXYGEN SATURATION: 95 % | HEART RATE: 61 BPM | HEIGHT: 65 IN | WEIGHT: 289.5 LBS | SYSTOLIC BLOOD PRESSURE: 120 MMHG | DIASTOLIC BLOOD PRESSURE: 80 MMHG | BODY MASS INDEX: 48.23 KG/M2

## 2017-12-26 DIAGNOSIS — E11.9 TYPE 2 DIABETES MELLITUS WITHOUT COMPLICATION, WITHOUT LONG-TERM CURRENT USE OF INSULIN (H): ICD-10-CM

## 2017-12-26 DIAGNOSIS — E66.01 MORBID OBESITY (H): Primary | ICD-10-CM

## 2017-12-26 PROCEDURE — 99213 OFFICE O/P EST LOW 20 MIN: CPT | Performed by: INTERNAL MEDICINE

## 2017-12-26 NOTE — PATIENT INSTRUCTIONS
Get 100g of protein per day.    Try to do lower intensity exercises every day.    Follow up in February.        [facility]    If you have any questions regarding to your visit please contact your care team:     Team Pink:   Clinic Hours Telephone Number   Internal Medicine:  Dr. Octavia Monroe NP       7am-7pm  Monday - Thursday   7am-5pm  Fridays  [phone]  (Appointment scheduling available 24/7)    Questions about your visit?  Team Line  [phone]   Urgent Care - [place] and [place] - 11am-9pm Monday-Friday Saturday-Sunday- 9am-5pm   [place] - 5pm-9pm Monday-Friday Saturday-Sunday- 9am-5pm  [phone] - [facility]  [phone] - [place]       What options do I have for visits at the clinic other than the traditional office visit?  To expand how we care for you, many of our providers are utilizing electronic visits (e-visits) and telephone visits, when medically appropriate, for interactions with their patients rather than a visit in the clinic.   We also offer nurse visits for many medical concerns. Just like any other service, we will bill your insurance company for this type of visit based on time spent on the phone with your provider. Not all insurance companies cover these visits. Please check with your medical insurance if this type of visit is covered. You will be responsible for any charges that are not paid by your insurance.      E-visits via [employer]:  generally incur a $35.00 fee.  Telephone visits:  Time spent on the phone: *charged based on time that is spent on the phone in increments of 10 minutes. Estimated cost:   5-10 mins $30.00   11-20 mins. $59.00   21-30 mins. $85.00   Use [employer] (secure email communication and access to your chart) to send your primary care provider a message or make an appointment. Ask someone on your Team how to sign up for [employer].    For a Price Quote for your services, please call our  Consumer Clement Line at [phone].    As always, Thank you for trusting us with your health care needs!    Discharged by Caitlyn CHISHOLM CMA ([facility])

## 2017-12-26 NOTE — MR AVS SNAPSHOT
After Visit Summary   12/26/2017    Gladys Singh    MRN: [id number]           Patient Information     Date Of Birth          1960        Visit Information        Provider Department      12/26/2017 10:45 AM Octavia Solis MD [facility] Instructions    Get 100g of protein per day.    Try to do lower intensity exercises every day.    Follow up in February.        [facility]    If you have any questions regarding to your visit please contact your care team:     Team Pink:   Clinic Hours Telephone Number   Internal Medicine:  Dr. Octavia Monroe NP       7am-7pm  Monday - Thursday   7am-5pm  Fridays  [phone]  (Appointment scheduling available 24/7)    Questions about your visit?  Team Line  [phone]   Urgent Care - [place] [facility] - 11am-9pm Monday-Friday Saturday-Sunday- 9am-5pm   [place] - 5pm-9pm Monday-Friday Saturday-Sunday- 9am-5pm  [phone] - [facility]  [phone] - [place]       What options do I have for visits at the clinic other than the traditional office visit?  To expand how we care for you, many of our providers are utilizing electronic visits (e-visits) and telephone visits, when medically appropriate, for interactions with their patients rather than a visit in the clinic.   We also offer nurse visits for many medical concerns. Just like any other service, we will bill your insurance company for this type of visit based on time spent on the phone with your provider. Not all insurance companies cover these visits. Please check with your medical insurance if this type of visit is covered. You will be responsible for any charges that are not paid by your insurance.      E-visits via [employer]:  generally incur a $35.00 fee.  Telephone visits:  Time spent on the phone: *charged based on time that is spent on the phone in increments of 10 minutes. Estimated cost:    5-10 mins $30.00   11-20 mins. $59.00   21-30 mins. $85.00   Use [email or web address] (secure email communication and access to your chart) to send your primary care provider a message or make an appointment. Ask someone on your Team how to sign up for [employer].    For a Price Quote for your services, please call our Consumer Price Line at [phone].    As always, Thank you for trusting us with your health care needs!    Discharged by Caitlyn CHISHOLM CMA ([facility])            Follow-ups after your visit        Your next 10 appointments already scheduled     Jan 02, 2018 10:00 AM CST   Office Visit with Jim Stratton MD   [facility] ([facility])    [address] [place] [id number]   [phone]           Bring a current list of meds and any records pertaining to this visit. For Physicals, please bring immunization records and any forms needing to be filled out. Please arrive 10 minutes early to complete paperwork.            Jan 05, 2018 10:00 AM CST   Nurse Only with HEALTH  - REGION 2   [facility] ([facility])    [address] [place] [address]   [phone]            Jan 22, 2018  8:30 AM CST   New Visit with Titus Wolf MD   [facility] ([facility])    [address] [place] [address]   [phone]            Jan 25, 2018  8:00 AM CST   (Arrive by 7:45 AM)   PAC PHONE RN ASSESSMENT with  Pac Rn   [facility] ([facility])    [address]  4th Floor  [facility] [id number] [phone]           Note: this is not an onsite visit; there is no need to come to the facility.            Jan 31, 2018  9:00 AM CST   (Arrive by 8:45 AM)   CT HEART ANGIO with UUCT4   [facility], [place], [place] ([facility] [place])    [address] [id number] [phone]           Please  allow two hours for this test.  Follow the instructions below:   The day before your exam, drink extra fluids at least six 8-ounce glasses (unless your doctor wants you to restrict your fluids).   No caffeine and no smoking the day of the test.   Do not eat or drink for 3 hours before your exam. You may take your morning medicines with small sips of water.   You may have or need a blood test (creatinine test) within 30 days of your exam. Go to your clinic or Diagnostic Imaging Department for this test.   If you take Viagra, Levitra or Cialis, stop taking it for 48 hours before your test.   If you are diabetic and take oral hypoglycemics, do not take them on the day of your test. Also, wait 48 hours before re-starting metformin (Avandamet, Glucophage, Glucovance, Metaglip).   Do not take diuretics on day of the test. This includes Furosemide (Lasix), Torsemide, Bumetanide (Bumex), Metolazone (Zaroxolyn) and Hydrochlorothiazide.   Do not take NSAIDS on the day of the test. This includes ibuprofen (Advil or Motrin), Naproxen and Indomethacin.  Bring any scans or X-rays taken at other hospitals, if similar tests were done. Also bring a list of your medicines, including vitamins, minerals and over-the-counter drugs. It is safest to leave personal items at home.  Be sure to tell your doctor:   If you have any allergies, including any reaction to contrast.   If there s any chance you are pregnant.   If you are breastfeeding.   If you have any special needs.  Please wear loose clothing, such as a sweat suit or jogging clothes. Avoid snaps, zippers and other metal. We may ask you to undress and put on a hospital gown.  If you have any questions, please call the Imaging Department where you will have your exam.            Jan 31, 2018 10:30 AM CST   Ech Ron with [id number]   [facility], [place],  Echocardiography ([facility], [facility])    [address]  [facility] [place] [address] [phone]            1.  Please bring or wear a comfortable two-piece outfit. 2.  Arrival time: -   [facility]:  arrive 75 minutes prior to examination time. -   [facility]:  arrive 90 minutes prior to examination time. -   [facility]:   arrive 15 minutes prior to examination time. 3.  Plan to have someone here to drive you home after the test. -   Someone should stay with you for 6 hours after your test. 4.  No food or drink: -   6 hours before the test 5.  If you take antacids or water pills (diuretics): Do not take them until after your test. You may take blood pressure medicine with a few sips of water. 6.  If you have diabetes: -   Morning slots preferred -   If you take insulin, call your diabetes care team. Ask if you should take a   dose the morning of your test. -   If you take diabetes medicine by mouth, don't take it on the morning of your test. Bring it with you to take after the test. (If you have questions, call your diabetes care team.) 7.  Bring a list of any medicines you are taking. 8.  Do not drive for 24 hours after the test. 9.   A responsible adult must stay with you for 24 hours after the test.  10.  For any questions that cannot be answered, please contact the ordering physician            Feb 01, 2018   Procedure with GENERIC ANESTHESIA PROVIDER   [facility] [place], Same Day Surgery (--)    [address] [address]   [phone]            Feb 01, 2018  8:00 AM CST   Ep 90 Minute with [id number]   [facility]  [facility] ([facility], [place])    [address] [address]   [phone]            Mar 05, 2018  9:00 AM CST   Return Visit with JACKELINE Grimaldo CNP   [facility] PHYS[facility] HEART AT [place] [facility] ([facility])    [address] [place] [address] 2nd Floor  [facility] [address]   [phone]            Apr 03, 2018  9:00 AM CDT   Nurse Only with FK ANCILLARY CARDS   [facility] PHYSICIANS HEART AT  "[place] [facility] ([facility])    [address] [place] [address] 2nd Floor  [place] [address] [phone]              Who to contact     If you have questions or need follow up information about today's clinic visit or your schedule please contact [facility] directly at [phone].  Normal or non-critical lab and imaging results will be communicated to you by MyChart, letter or phone within 4 business days after the clinic has received the results. If you do not hear from us within 7 days, please contact the clinic through [employer] or phone. If you have a critical or abnormal lab result, we will notify you by phone as soon as possible.  Submit refill requests through [employer] or call your pharmacy and they will forward the refill request to us. Please allow 3 business days for your refill to be completed.          Additional Information About Your Visit        [employer] Information     [employer] gives you secure access to your electronic health record. If you see a primary care provider, you can also send messages to your care team and make appointments. If you have questions, please call your primary care clinic.  If you do not have a primary care provider, please call [phone] and they will assist you.        Care EveryWhere ID     This is your Care EveryWhere ID. This could be used by other organizations to access your [facility] medical records  [id number]        Your Vitals Were     Pulse Temperature Height Last Period Pulse Oximetry BMI (Body Mass Index)    61 97  F (36.1  C) (Oral) 5' 5.24\" (1.657 m) 02/01/2010 95% 47.83 kg/m2       Blood Pressure from Last 3 Encounters:   12/26/17 120/80   12/18/17 110/60   12/11/17 127/82    Weight from Last 3 Encounters:   12/26/17 289 lb 8 oz (131.3 kg)   12/18/17 293 lb 3.2 oz (133 kg)   12/11/17 287 lb (130.2 kg)              Today, you had the following     No orders found for display       Primary Care Provider Office Phone # Fax #    Vania Ramsey " Oneil Almendarez, APRN [facility] [phone]       [address]  [facility] [address]        Equal Access to Services     OSCAR URBANO : Hadford nemo blandon marshal Sodanny, waamarilysda luqadaha, qaybta kaalmada vee, chelsie briancharis adolfo. So [facility] [phone].    ATENCIÓN: Si habla español, tiene a eason disposición servicios gratuitos de asistencia lingüística. Llame al [phone].    We comply with applicable federal civil rights laws and [place] laws. We do not discriminate on the basis of race, color, national origin, age, disability, sex, sexual orientation, or gender identity.            Thank you!     Thank you for choosing [facility]  for your care. Our goal is always to provide you with excellent care. Hearing back from our patients is one way we can continue to improve our services. Please take a few minutes to complete the written survey that you may receive in the mail after your visit with us. Thank you!             Your Updated Medication List - Protect others around you: Learn how to safely use, store and throw away your medicines at www.disposemymeds.org.          This list is accurate as of: 12/26/17 11:09 AM.  Always use your most recent med list.                   Brand Name Dispense Instructions for use Diagnosis    AMBIEN 10 MG tablet   Generic drug:  zolpidem      1 TABLET DAILY AT BEDTIME        ASPIRIN NOT PRESCRIBED    INTENTIONAL    0 each    Please choose reason not prescribed, below    Paroxysmal atrial fibrillation (H)       blood glucose monitoring lancets     1 Box    TEST ONCE DAILY OR AS NEEDED *DISPENSE WHATEVER IS COVERED BY INS*    Type 2 diabetes mellitus without complication, without long-term current use of insulin (H)       blood glucose monitoring meter device kit    no brand specified    1 kit    Use to test blood sugar one times daily or as directed.    Type 2 diabetes mellitus without complication, without long-term current use of  insulin (H)       blood glucose monitoring test strip    no brand specified    100 strip    Use to test blood sugars one times daily or as directed    Type 2 diabetes mellitus without complication, without long-term current use of insulin (H)       BUSPAR PO      Take 30 mg by mouth 2 times daily        BYDUREON 2 MG pen   Generic drug:  exenatide ER      Inject 2 mg Subcutaneous every 7 days        calcium carbonate 500 MG tablet   Generic drug:  Oyster Shell Calcium     270 tablet    TAKE 1 TABLET BY MOUTH. THREE TIMES DAILY.    Elevated PTHrP level (H)       CVS SPECTRAVITE ULTRA WOMEN Tabs     90 tablet    TAKE 1 TABLET BY MOUTH DAILY    Constipation, chronic       cyanocobalamin 1000 MCG Tabs    CVS vitamin  B12    90 tablet    TAKE 1 TABLET (1,000 MCG) BY MOUTH DAILY    Vitamin B12 deficiency (non anemic)       dabigatran ANTICOAGULANT 150 MG capsule    PRADAXA    60 capsule    Take 1 capsule (150 mg) by mouth twice daily. Store in original 's bottle or blister pack; use within 120 days of opening.    Paroxysmal atrial fibrillation (H)       docusate sodium 100 MG capsule    COLACE    180 capsule    TAKE 1 CAPSULE (100 MG) BY MOUTH 2 TIMES DAILY    Constipation, unspecified constipation type       escitalopram 20 MG tablet    LEXAPRO    90 tablet    Take 1 tablet (20 mg) by mouth daily    Paroxysmal atrial fibrillation (H)       Ferrous Sulfate 143 (45 FE) MG Tbcr    CVS SLOW RELEASE IRON    180 tablet    Take 1 tablet by mouth 2 times daily    Iron deficiency       flecainide 50 MG tablet    TAMBOCOR    90 tablet    Take 1.5 tablets (75 mg) by mouth 2 times daily    Atrial fibrillation (H)       furosemide 40 MG tablet    LASIX    90 tablet    TAKE 1 TABLET (40 MG) BY MOUTH DAILY    Pedal edema       insulin pen needle 31G X 5 MM    B-D U/F    100 each    Use once daily or as directed.    Type 2 diabetes mellitus without complication (H)       levothyroxine 75 MCG tablet    SYNTHROID/LEVOTHROID     60 tablet    TAKE 1 TABLET (75 MG) BY MOUTH EVERY DAY    Acquired hypothyroidism, Vitamin D deficiency, Hyperparathyroidism (H), Abdominal pain, epigastric       lisinopril 10 MG tablet    PRINIVIL/ZESTRIL    90 tablet    TAKE 1 TABLET (10 MG) BY MOUTH DAILY    Type 2 diabetes mellitus without complication, without long-term current use of insulin (H), Essential hypertension with goal blood pressure less than 140/90       LORazepam 1 MG tablet    ATIVAN     Take 1 mg by mouth At Bedtime        metoprolol 100 MG 24 hr tablet    TOPROL-XL    90 tablet    Take 1 tablet (100 mg) by mouth daily    Paroxysmal atrial fibrillation (H), Chest pain, unspecified type       omeprazole 20 MG CR capsule    priLOSEC    180 capsule    Take 1 capsule (20 mg) by mouth 2 times daily    Hiatal hernia       order for DME     1 Box    Test strips for pt's glucometer, brand as covered by insurance. Test twice daily or PRN.    Type 2 diabetes mellitus without complication (H)       potassium chloride SA 20 MEQ CR tablet    KLOR-CON    180 tablet    TAKE 1 TABLET (20 MEQ) BY MOUTH 2 TIMES DAILY    Diuresis       ranitidine 150 MG tablet    ZANTAC    60 tablet    TAKE 1 TABLET (150 MG) BY MOUTH 2 TIMES DAILY    Gastroesophageal reflux disease, esophagitis presence not specified       simvastatin 20 MG tablet    ZOCOR    90 tablet    TAKE 1 TABLET (20 MG) BY MOUTH AT BEDTIME NEED LABS    Hyperlipidemia LDL goal <100       vitamin D 22548 UNIT capsule    ERGOCALCIFEROL    8 capsule    TAKE 1 CAPSULE BY MOUTH ONCE WEEKLY.    Vitamin D deficiency, Hyperparathyroidism (H), Acquired hypothyroidism, Abdominal pain, epigastric       Vitamin D3 3000 UNITS Tabs

## 2017-12-26 NOTE — NURSING NOTE
"Chief Complaint   Patient presents with     Weight Check     1 month follow-up       Initial /80  Pulse 61  Temp 97  F (36.1  C) (Oral)  Ht 5' 5.24\" (1.657 m)  Wt 289 lb 8 oz (131.3 kg)  LMP 02/01/2010  SpO2 95%  BMI 47.83 kg/m2 Estimated body mass index is 47.83 kg/(m^2) as calculated from the following:    Height as of this encounter: 5' 5.24\" (1.657 m).    Weight as of this encounter: 289 lb 8 oz (131.3 kg).  Medication Reconciliation: complete   Hannah CHIRINOS MA      "

## 2017-12-28 ENCOUNTER — TRANSFERRED RECORDS (OUTPATIENT)
Dept: HEALTH INFORMATION MANAGEMENT | Facility: CLINIC | Age: 57
End: 2017-12-28

## 2018-01-05 ENCOUNTER — ALLIED HEALTH/NURSE VISIT (OUTPATIENT)
Dept: NURSING | Facility: CLINIC | Age: 58
End: 2018-01-05

## 2018-01-05 DIAGNOSIS — E66.01 MORBID OBESITY DUE TO EXCESS CALORIES (H): Primary | ICD-10-CM

## 2018-01-05 PROCEDURE — 99207 ZZC HEALTH COACHING, NO CHARGE: CPT

## 2018-01-05 NOTE — MR AVS SNAPSHOT
After Visit Summary   1/5/2018    Gladys Singh    MRN: [id number]           Patient Information     Date Of Birth          1960        Visit Information        Provider Department      1/5/2018 10:00 AM HEALTH  - REGION 2 [facility] [place]        Today's Diagnoses     Morbid obesity due to excess calories (H)    -  1       Follow-ups after your visit        Your next 10 appointments already scheduled     Jan 25, 2018  8:00 AM CST   (Arrive by 7:45 AM)   PAC PHONE RN ASSESSMENT with Uc Pac Rn   [facility] ([facility] [place])    [address]  4th Floor  [facility] [id number] [phone]           Note: this is not an onsite visit; there is no need to come to the facility.            Jan 31, 2018  9:00 AM CST   (Arrive by 8:45 AM)   CT HEART ANGIO with UUCT4   [facility], [place], [place] ([facility], [facility])    [address] [id number] [phone]           Please allow two hours for this test.  Follow the instructions below:   The day before your exam, drink extra fluids at least six 8-ounce glasses (unless your doctor wants you to restrict your fluids).   No caffeine and no smoking the day of the test.   Do not eat or drink for 3 hours before your exam. You may take your morning medicines with small sips of water.   You may have or need a blood test (creatinine test) within 30 days of your exam. Go to your clinic or Diagnostic Imaging Department for this test.   If you take Viagra, Levitra or Cialis, stop taking it for 48 hours before your test.   If you are diabetic and take oral hypoglycemics, do not take them on the day of your test. Also, wait 48 hours before re-starting metformin (Avandamet, Glucophage, Glucovance, Metaglip).   Do not take diuretics on day of the test. This includes Furosemide (Lasix), Torsemide, Bumetanide (Bumex), Metolazone (Zaroxolyn) and  Hydrochlorothiazide.   Do not take NSAIDS on the day of the test. This includes ibuprofen (Advil or Motrin), Naproxen and Indomethacin.  Bring any scans or X-rays taken at other hospitals, if similar tests were done. Also bring a list of your medicines, including vitamins, minerals and over-the-counter drugs. It is safest to leave personal items at home.  Be sure to tell your doctor:   If you have any allergies, including any reaction to contrast.   If there s any chance you are pregnant.   If you are breastfeeding.   If you have any special needs.  Please wear loose clothing, such as a sweat suit or jogging clothes. Avoid snaps, zippers and other metal. We may ask you to undress and put on a hospital gown.  If you have any questions, please call the Imaging Department where you will have your exam.            Jan 31, 2018 10:30 AM CST   Ech Ron with [email or web address]   [facility], [place],  Echocardiography ([facility], [facility])    [address] [id number] [phone]           1.  Please bring or wear a comfortable two-piece outfit. 2.  Arrival time: -   [facility]:  arrive 75 minutes prior to examination time. -   [facility]:  arrive 90 minutes prior to examination time. -   [facility]:   arrive 15 minutes prior to examination time. 3.  Plan to have someone here to drive you home after the test. -   Someone should stay with you for 6 hours after your test. 4.  No food or drink: -   6 hours before the test 5.  If you take antacids or water pills (diuretics): Do not take them until after your test. You may take blood pressure medicine with a few sips of water. 6.  If you have diabetes: -   Morning slots preferred -   If you take insulin, call your diabetes care team. Ask if you should take a   dose the morning of your test. -   If you take diabetes medicine by mouth, don't take it on the morning of your test. Bring it with you to take after the test. (If you have  questions, call your diabetes care team.) 7.  Bring a list of any medicines you are taking. 8.  Do not drive for 24 hours after the test. 9.   A responsible adult must stay with you for 24 hours after the test.  10.  For any questions that cannot be answered, please contact the ordering physician            Feb 01, 2018   Procedure with GENERIC ANESTHESIA PROVIDER   [facility]--)    [address] [address]   [phone]            Feb 01, 2018  8:00 AM CST   Ep 90 Minute with [email or web address]   [facility] ([facility], [facility])    [address] [address]   [phone]            Feb 21, 2018 10:30 AM CST   SHORT with Octavia Solis MD   [facility] [place] ([facility])    [address] [place] [address]   [phone]            Mar 05, 2018  9:00 AM CST   Return Visit with JACKELINE Grimlado CNP   [facility] PHYSICIANS HEART AT [facility] ([facility])    [address] [place] [address]   [phone]            Apr 03, 2018  9:00 AM CDT   Nurse Only with FK ANCILLARY CARDS   [facility] PHYSICIANS HEART AT [facility] ([facility])    [address] [place] [address]   [phone]            May 14, 2018  1:00 PM CDT   Return Visit with Ira Pierre MD   [facility] PHYSICIANS HEART AT [facility] ([facility])    [address] [place] [address]   [phone]              Who to contact     If you have questions or need follow up information about today's clinic visit or your schedule please contact [facility] [place] directly at [phone].  Normal or non-critical lab and imaging results will be communicated to you by MyChart, letter or phone within 4 business days after the clinic has received the results. If you do not hear  from us within 7 days, please contact the clinic through [employer] or phone. If you have a critical or abnormal lab result, we will notify you by phone as soon as possible.  Submit refill requests through [employer] or call your pharmacy and they will forward the refill request to us. Please allow 3 business days for your refill to be completed.          Additional Information About Your Visit        [employer] Information     [employer] gives you secure access to your electronic health record. If you see a primary care provider, you can also send messages to your care team and make appointments. If you have questions, please call your primary care clinic.  If you do not have a primary care provider, please call [phone] and they will assist you.        Care EveryWhere ID     This is your Care EveryWhere ID. This could be used by other organizations to access your [facility] medical records  [id number]        Your Vitals Were     Last Period                   02/01/2010            Blood Pressure from Last 3 Encounters:   12/26/17 120/80   12/18/17 110/60   12/11/17 127/82    Weight from Last 3 Encounters:   12/26/17 289 lb 8 oz (131.3 kg)   12/18/17 293 lb 3.2 oz (133 kg)   12/11/17 287 lb (130.2 kg)              Today, you had the following     No orders found for display       Primary Care Provider Office Phone # Fax #    Vania Ramsey Oneil Almendarez, JACKELINE [facility] [phone] [address] [address]        Equal Access to Services     [facility] : Hadii nemo ku hadasho Soomaali, waaxda luqadaha, qaybta kaalmada adeegyada, chelsie arguello . So [facility] [phone].    ATENCIÓN: Si habla español, tiene a eason disposición servicios gratuitos de asistencia lingüística. Llame al [phone].    We comply with applicable federal civil rights laws and [place] laws. We do not discriminate on the basis of race, color, national origin, age, disability, sex, sexual orientation, or  gender identity.            Thank you!     Thank you for choosing [facility]  for your care. Our goal is always to provide you with excellent care. Hearing back from our patients is one way we can continue to improve our services. Please take a few minutes to complete the written survey that you may receive in the mail after your visit with us. Thank you!             Your Updated Medication List - Protect others around you: Learn how to safely use, store and throw away your medicines at www.disposemymeds.org.          This list is accurate as of: 1/5/18 11:59 PM.  Always use your most recent med list.                   Brand Name Dispense Instructions for use Diagnosis    AMBIEN 10 MG tablet   Generic drug:  zolpidem      1 TABLET DAILY AT BEDTIME        ASPIRIN NOT PRESCRIBED    INTENTIONAL    0 each    Please choose reason not prescribed, below    Paroxysmal atrial fibrillation (H)       blood glucose monitoring lancets     1 Box    TEST ONCE DAILY OR AS NEEDED *DISPENSE WHATEVER IS COVERED BY INS*    Type 2 diabetes mellitus without complication, without long-term current use of insulin (H)       blood glucose monitoring meter device kit    no brand specified    1 kit    Use to test blood sugar one times daily or as directed.    Type 2 diabetes mellitus without complication, without long-term current use of insulin (H)       blood glucose monitoring test strip    no brand specified    100 strip    Use to test blood sugars one times daily or as directed    Type 2 diabetes mellitus without complication, without long-term current use of insulin (H)       BUSPAR PO      Take 30 mg by mouth 2 times daily        BYDUREON 2 MG pen   Generic drug:  exenatide ER      Inject 2 mg Subcutaneous every 7 days        calcium carbonate 500 MG tablet   Generic drug:  Oyster Shell Calcium     270 tablet    TAKE 1 TABLET BY MOUTH. THREE TIMES DAILY.    Elevated PTHrP level (H)       CVS SPECTRAVITE ULTRA WOMEN Tabs      90 tablet    TAKE 1 TABLET BY MOUTH DAILY    Constipation, chronic       cyanocobalamin 1000 MCG Tabs    CVS vitamin  B12    90 tablet    TAKE 1 TABLET (1,000 MCG) BY MOUTH DAILY    Vitamin B12 deficiency (non anemic)       dabigatran ANTICOAGULANT 150 MG capsule    PRADAXA    60 capsule    Take 1 capsule (150 mg) by mouth twice daily. Store in original 's bottle or blister pack; use within 120 days of opening.    Paroxysmal atrial fibrillation (H)       docusate sodium 100 MG capsule    COLACE    180 capsule    TAKE 1 CAPSULE (100 MG) BY MOUTH 2 TIMES DAILY    Constipation, unspecified constipation type       escitalopram 20 MG tablet    LEXAPRO    90 tablet    Take 1 tablet (20 mg) by mouth daily    Paroxysmal atrial fibrillation (H)       Ferrous Sulfate 143 (45 FE) MG Tbcr    CVS SLOW RELEASE IRON    180 tablet    Take 1 tablet by mouth 2 times daily    Iron deficiency       flecainide 50 MG tablet    TAMBOCOR    90 tablet    Take 1.5 tablets (75 mg) by mouth 2 times daily    Atrial fibrillation (H)       furosemide 40 MG tablet    LASIX    90 tablet    TAKE 1 TABLET (40 MG) BY MOUTH DAILY    Pedal edema       insulin pen needle 31G X 5 MM    B-D U/F    100 each    Use once daily or as directed.    Type 2 diabetes mellitus without complication (H)       levothyroxine 75 MCG tablet    SYNTHROID/LEVOTHROID    60 tablet    TAKE 1 TABLET (75 MG) BY MOUTH EVERY DAY    Acquired hypothyroidism, Vitamin D deficiency, Hyperparathyroidism (H), Abdominal pain, epigastric       lisinopril 10 MG tablet    PRINIVIL/ZESTRIL    90 tablet    TAKE 1 TABLET (10 MG) BY MOUTH DAILY    Type 2 diabetes mellitus without complication, without long-term current use of insulin (H), Essential hypertension with goal blood pressure less than 140/90       LORazepam 1 MG tablet    ATIVAN     Take 1 mg by mouth At Bedtime        metoprolol 100 MG 24 hr tablet    TOPROL-XL    90 tablet    Take 1 tablet (100 mg) by mouth daily     Paroxysmal atrial fibrillation (H), Chest pain, unspecified type       omeprazole 20 MG CR capsule    priLOSEC    180 capsule    Take 1 capsule (20 mg) by mouth 2 times daily    Hiatal hernia       order for DME     1 Box    Test strips for pt's glucometer, brand as covered by insurance. Test twice daily or PRN.    Type 2 diabetes mellitus without complication (H)       potassium chloride SA 20 MEQ CR tablet    KLOR-CON    180 tablet    TAKE 1 TABLET (20 MEQ) BY MOUTH 2 TIMES DAILY    Diuresis       ranitidine 150 MG tablet    ZANTAC    60 tablet    TAKE 1 TABLET (150 MG) BY MOUTH 2 TIMES DAILY    Gastroesophageal reflux disease, esophagitis presence not specified       simvastatin 20 MG tablet    ZOCOR    90 tablet    TAKE 1 TABLET (20 MG) BY MOUTH AT BEDTIME NEED LABS    Hyperlipidemia LDL goal <100       vitamin D 46057 UNIT capsule    ERGOCALCIFEROL    8 capsule    TAKE 1 CAPSULE BY MOUTH ONCE WEEKLY.    Vitamin D deficiency, Hyperparathyroidism (H), Acquired hypothyroidism, Abdominal pain, epigastric       Vitamin D3 3000 UNITS Tabs

## 2018-01-05 NOTE — PROGRESS NOTES
January 5, 2018    [facility]  [address]  [place] [address]  [phone]  [phone]  Health Coaching Progress Note    Patient Name: Gladys Singh Date: January 5, 2018      Session Length: 35      DATA    PRM Master Survey Scores Reviewed: Yes    Core Healthy Days Survey:         KO Score (Last Two) 7/14/2016 12/8/2017   KO Raw Score 33 32   Activation Score 41.7 62.6   KO Level 1 3       PHQ-2 Score 12/8/2017 10/31/2017   PHQ-2 Total Score Interpretation - Positive if 3 or more points; Administer PHQ-9 if positive 3 0       PHQ-9 SCORE 12/8/2017 12/18/2017   Total Score - -   Total Score 7 4       Treatment Objective(s) Addressed in This Session:  Target Behavior(s): diet/weight loss    Current Stressors / Issues:  Gladys shares today that she has started making changes but needs to do so in her own way at her own pace. Writer verbalizes understanding and agreement with this.    What Patient Does Well:   Gladys has begun making changes to her diet.    Previous Successes:   Gladys is trying to focus on eating more real food now and not as much packaged/processed food as she was before. Gladys started eating this way as a vegan as it was more clear and also convenient. Gladys is finding that she is enjoying cooking more.    Areas in Need of Improvement:   Gladys shares that she has not started exercising yet and that she is not very comfortable with doing so prior to her ablation procedure. Writer verbalizes understanding of her line of thinking but encourages Gladys to do anything that she does feel she can do in support of physical activity as this may improve surgical outcomes (not the same as exercising for wt loss). Gladys verbalizes understanding of this and says she'll try to do a little bit maybe.    Barriers to Change:   Gladys is having an ablation procedure on 1/18/18 and is hesitant to begin exercising much before recovering from  that. Gladys is discouraged that people seem to be stressing exercise strongly to her and is very uncomfortable with her breathing and A fib currently. We discuss this further today and Gladys understands why but doesn't feel providers understand her.      Reasons for Change:   Gladys want to make changes for her health and self-esteem.    Plan/Goal for the Next 4 Weeks:     GOAL #1: Continue eating 1 serving of protein/meal  GOAL #1 Progress Toward Goal: 25%  GOAL #2: Walk and do band workout every day as able  GOAL #2 Progress Toward Goal: 0%    Intervention:  Motivational Interviewing    MI Intervention: Expressed Empathy/Understanding, Supported Autonomy, Collaboration, Evocation, Permission to raise concern or advise, Open-ended questions, Reflections: simple and complex, Change talk (evoked) and Reframe     Change Talk Expressed by the Patient: Desire to change Ability to change Reasons to change Committment to change Activation Taking steps    Provider Response to Change Talk: E - Evoked more info from patient about behavior change, A - Affirmed patient's thoughts, decisions, or attempts at behavior change, R - Reflected patient's change talk and S - Summarized patient's change talk statements    Assessment / Progress on Treatment Objective(s) / Homework:    Satisfactory progress - ACTION (Actively working towards change); Intervened by reinforcing change plan / affirming steps taken         Plan: (Homework, other):  Patient was encouraged to continue to seek condition-related information and education, as well as schedule a follow up appointment with the Health  in 6 weeks. Patient has set self-identified goals and will monitor progress until the next appointment.  Next visit scheduled for February 26 at 10AM.      Ramon Dey

## 2018-01-18 ENCOUNTER — TRANSFERRED RECORDS (OUTPATIENT)
Dept: HEALTH INFORMATION MANAGEMENT | Facility: CLINIC | Age: 58
End: 2018-01-18

## 2018-01-24 ENCOUNTER — TRANSFERRED RECORDS (OUTPATIENT)
Dept: HEALTH INFORMATION MANAGEMENT | Facility: CLINIC | Age: 58
End: 2018-01-24

## 2018-01-24 ENCOUNTER — TELEPHONE (OUTPATIENT)
Dept: FAMILY MEDICINE | Facility: CLINIC | Age: 58
End: 2018-01-24

## 2018-01-24 NOTE — TELEPHONE ENCOUNTER
Spoke to [facility] pharmacy in regards to clarification that is needed below. They state to disregard this at they were able to fill this and patient has multiple refills available as well.    Sofía Carter, CMA

## 2018-01-24 NOTE — TELEPHONE ENCOUNTER
Received fax requesting alternative medication for Omeprazole 20mg due to medication expiring 01/24/2018 but too soon to fill. Please advise. Shantal Booth MA

## 2018-01-26 ENCOUNTER — TRANSFERRED RECORDS (OUTPATIENT)
Dept: HEALTH INFORMATION MANAGEMENT | Facility: CLINIC | Age: 58
End: 2018-01-26

## 2018-01-31 DIAGNOSIS — H66.005 RECURRENT ACUTE SUPPURATIVE OTITIS MEDIA WITHOUT SPONTANEOUS RUPTURE OF LEFT TYMPANIC MEMBRANE: ICD-10-CM

## 2018-02-01 RX ORDER — AMOXICILLIN 500 MG/1
CAPSULE ORAL
Qty: 21 CAPSULE | Refills: 0 | OUTPATIENT
Start: 2018-02-01

## 2018-02-01 NOTE — TELEPHONE ENCOUNTER
amoxicillin (AMOXIL) 500 MG capsule      Last Written Prescription Date:  10/31/2017  Last Fill Quantity: 21,   # refills: 0  Last Office Visit: 1/2/2018  Future Office visit:    Next 5 appointments (look out 90 days)     Feb 21, 2018 10:30 AM CST   SHORT with Octavia Solis MD   [facility] ([address] [address]   [phone]                   Routing refill request to provider for review/approval because:  Drug not on the [facility], [facility] or  Health refill protocol or controlled substance

## 2018-02-01 NOTE — TELEPHONE ENCOUNTER
Spoke with pt. States this is related to knee replacement as prophylaxis for joint infection. Gave her provider's message as written and she was ok with this.    Fariba Pimentel RN  [facility]

## 2018-02-01 NOTE — TELEPHONE ENCOUNTER
Is she using it for her knee replacement as prophylaxis for joint infection?  If so, this is an old recommendation and current guidelines no longer recommend antibiotic use prior to dental procedures if patient has had joint replacement.    Vania Monroe, CNP

## 2018-02-01 NOTE — TELEPHONE ENCOUNTER
Spoke to patient in regards to message below, patient states she is requesting the Amoxicillin that she take prior to dental procedures.  Hannah CHIRINOS MA

## 2018-03-08 ENCOUNTER — ALLIED HEALTH/NURSE VISIT (OUTPATIENT)
Dept: EDUCATION SERVICES | Facility: CLINIC | Age: 58
End: 2018-03-08
Payer: COMMERCIAL

## 2018-03-08 DIAGNOSIS — E11.9 TYPE 2 DIABETES MELLITUS WITHOUT COMPLICATION, WITHOUT LONG-TERM CURRENT USE OF INSULIN (H): Primary | ICD-10-CM

## 2018-03-08 PROCEDURE — G0108 DIAB MANAGE TRN  PER INDIV: HCPCS

## 2018-03-08 NOTE — PROGRESS NOTES
Diabetes Self Management Training: Follow-up Visit    Gladys Singh presents today for education related to Type 2 diabetes.    She is accompanied by self    Patient's diabetes management related comments/concerns: had cardiac ablation ~2 months ago, not getting enough protein on a vegan diet, has had weight gain over the past several months and is frustrated with this    Patient would like this visit to be focused around the following diabetes-related behaviors and goals: Self-care behavioral goal setting, Healthy Eating, Being Active and Monitoring    ASSESSMENT:  Patient Problem List reviewed for relevant medical history and current medical status.    Patient is motivated to lose weight while remaining on a vegan diet. She has seen ongoing weight gain over the past 3-6 months.    Current Diabetes Management per Patient:  Taking diabetes medications?   yes:     Diabetes Medication(s)     Incretin Mimetic Agents (GLP-1 Receptor Agonists) Sig    exenatide ER (BYDUREON) 2 MG pen Inject 2 mg Subcutaneous every 7 days      Tolerating bydureon with no side effects for the past year.    *Abbreviated insulin dose documentation key: Insulin Trade Name (ebckvmqze-ojarb-ctstnv-bedtime) - i.e. Humalog 5-5-5-0 (Humalog 5 units at breakfast, 5 units at lunch, and 5 units at dinner).    Patient glucose self monitoring as follows: 3-4 times a week.   BG meter: One Touch Ultra Mini meter  BG results:        BG values are: In goal  Patient's most recent   Lab Results   Component Value Date    A1C 6.0 12/06/2016    is meeting goal of <7.0    Nutrition:  Patient eats 3 meals per day and follows a vegan diet.  She was told by one of her doctors that her goal intake of protein was 120 grams/day and has been closely tracking this but struggling to reach this goal.     Breakfast - 2 slices of bread (100 floridalma/slice, 5-6 g protein), mushroom slices (12 g protein/3 slices) OR oatmeal peanut butter, 2 cups of coffee black  Lunch -  "vegan riblet (16 g protein), coconut yogurt with frozen fruit   Dinner - taco with refried beans & lettuce    Snacks - vegetable chips, yogurt with fruit, orange juice (8 oz)    Beverages: Juice every other day, 1 glass at night and Water ~8 glasses/day    Cultural/Restorationism diet restrictions: Vegan diet for 7 years, does not like Chik patties/filets as she had a chicken growing up as a pet     Biggest Challenge to Healthy Eating: portion control and knowing what to eat    Physical Activity:    Type: walking  Duration: 20 minutes  Frequency: 7 days/week  Barriers: trouble breathing & chest pain at night since surgery, hasn't been able to exercise much over the past 2 months    Diabetes Complications:  Not discussed today.    Vitals:  LMP 02/01/2010  Estimated body mass index is 47.83 kg/(m^2) as calculated from the following:    Height as of 12/26/17: 1.657 m (5' 5.24\").    Weight as of 12/26/17: 131.3 kg (289 lb 8 oz).   Last 3 BP:   BP Readings from Last 3 Encounters:   12/26/17 120/80   12/18/17 110/60   12/11/17 127/82       History   Smoking Status     Former Smoker     Packs/day: 1.50     Years: 13.00     Quit date: 1/1/1994   Smokeless Tobacco     Never Used       Labs:  Lab Results   Component Value Date    A1C 6.0 12/06/2016     Lab Results   Component Value Date     12/18/2017     Lab Results   Component Value Date    LDL 85 09/01/2017    LDL 77 07/07/2016     HDL Cholesterol   Date Value Ref Range Status   07/07/2016 39 (L) >49 mg/dL Final   ]  GFR Estimate   Date Value Ref Range Status   12/18/2017 68 >60 mL/min/1.7m2 Final     Comment:     Non  GFR Calc     GFR Estimate If Black   Date Value Ref Range Status   12/18/2017 82 >60 mL/min/1.7m2 Final     Comment:      GFR Calc     Lab Results   Component Value Date    CR 0.86 12/18/2017     No results found for: MICROALBUMIN    Health Beliefs and Attitudes:   Patient Activation Measure Survey Score:  KO Score (Last " Two) 7/14/2016 12/8/2017   KO Raw Score 33 32   Activation Score 41.7 62.6   KO Level 1 3       Stage of Change: ACTION (Actively working towards change)    Diabetes knowledge and skills assessment:     Patient is knowledgeable in diabetes management concepts related to: Healthy Eating, Being Active and Monitoring    Patient needs further education on the following diabetes management concepts: Healthy Eating and Being Active    Barriers to Learning Assessment: No Barriers identified    Based on learning assessment above, most appropriate setting for further diabetes education would be: Group class or Individual setting.    INTERVENTION:  Educated on importance of healthy balance, plate planning method and goal of closer to 75 grams/day protein (0.85-1 g/kg adjusted body weight of 75 kg) to avoid excess caloric intake and support weight loss.   Patient would like to return to swimming as an activity and has considered joining the [employer]. Encouraged her to check with her doctor first, but then pursue this goal if medically able.   Reviewed importance of low fat, vegan diet to support diabetes management and weight loss, keeping to no more than 3-5 g fat/serving at meals.   Encouraged increased vegetable intake as low carb, high fiber, vegan snack option   Recommended utilizing a nutrition/fitness jodee like burrp! or Gozent to help her track her intake & activity. She does not use a smart phone but does have an iPad that she could utilize for this. She is willing to ask her daughter for help in obtaining an jodee and plans to track for at least a few weeks.     Education provided today on:  AADE Self-Care Behaviors:  Healthy Eating: carbohydrate counting, weight reduction, portion control, plate planning method and getting adequate protein on vegan diet   Being Active: relationship to blood glucose, describe appropriate activity program and precautions to take, referred to cardiologist/MD team as to when she may  increase her activity s/p ablation  Monitoring: purpose, individual blood glucose targets and frequency of monitoring    Opportunities for ongoing education and support in diabetes-self management were discussed.    Pt verbalized understanding of concepts discussed and recommendations provided today.       Education Materials Provided:  BG Log Sheet, Vegan Plate Planner, & Vegan Protein Source List     PLAN:  See Patient Instructions for co-developed, patient-stated behavior change goals.  Meal Plan Recommendation: use portion control, use plate planning method, Aim for 3-4 carb servings at meals and 0-1 carb servings at snacks, have a protein source at each meal with goal protein intake of ~75 grams/day, consider utilizing a tracking natanael to help identify adequacy of intake   Exercise / activity plan: goal of 150 minutes/week, per doctor's approval   Check blood sugars once daily, varying times to help understand patterns  AVS printed and provided to patient today.    FOLLOW-UP:  Follow-up as needed.   Patient reports she is having her Hgb A1C rechecked at the end of March and has an appointment with a new Endocrinologist on April 11.   Chart routed to referring provider.    Ongoing plan for education and support: Smartphone Natanael(s), Weight loss program, Exercise program and Written resources (magazines, books, etc.)    Rosanna Alberto RD, LD    Time Spent: 60 minutes  Encounter Type: Individual    Any diabetes medication dose changes were made via the CDE Protocol and Collaborative Practice Agreement with the patient's referring provider. A copy of this encounter was shared with the provider.

## 2018-03-08 NOTE — PATIENT INSTRUCTIONS
- Watch fat intake, no more than 3 grams/serving or 5 grams/serving in convenience meals like Ana's   - Focus on protein at each meal, goal of ~75 grams/day   - Limit carbohydrates to 45-60 grams/meal & 0-15 grams/snack   - Per doctor's approval, work towards 150 minutes/week of activity   - Try a fitness/nutrition tracking jodee on your iPad - MyFitnessPal, LoseIt!    Bring blood glucose meter and logbook with you to all doctor and follow-up appointments.     [facility] for the [facility] Area:  For Your Diabetes Education and Nutrition Appointments Call:  [phone]   For Diabetes Education or Nutrition Related Questions:   Phone: [phone]  E-mail: [email or web address]  Fax: [phone]   If you need a medication refill please contact your pharmacy. Please allow 3 business days for your refills to be completed.    Instructions for emailing the Diabetes Educators    If you need to communicate a non-urgent message to a Diabetes Educator via email, please send to [email or web address].    Please follow the following email guidelines:    Subject line: Secure: your clinic name (example: Secure: [facility])  In the email please include: First name, middle initial, last name and date of birth.    We will be in touch with you within one (1) business day.

## 2018-03-08 NOTE — MR AVS SNAPSHOT
After Visit Summary   3/8/2018    Gladys Singh    MRN: [id number]           Patient Information     Date Of Birth          1960        Visit Information        Provider Department      3/8/2018 9:30 AM  DIABETIC ED RESOURCE [facility]        Care Instructions    - Watch fat intake, no more than 3 grams/serving or 5 grams/serving in convenience meals like Ana's   - Focus on protein at each meal, goal of ~75 grams/day   - Limit carbohydrates to 45-60 grams/meal & 0-15 grams/snack   - Per doctor's approval, work towards 150 minutes/week of activity   - Try a fitness/nutrition tracking jodee on your iPad - MyFitnessPal, LoseIt!    Bring blood glucose meter and logbook with you to all doctor and follow-up appointments.     [facility] for the [facility] Area:  For Your Diabetes Education and Nutrition Appointments Call:  [phone]   For Diabetes Education or Nutrition Related Questions:   Phone: [phone]  E-mail: [email or web address]  Fax: [phone]   If you need a medication refill please contact your pharmacy. Please allow 3 business days for your refills to be completed.    Instructions for emailing the Diabetes Educators    If you need to communicate a non-urgent message to a Diabetes Educator via email, please send to [email or web address].    Please follow the following email guidelines:    Subject line: Secure: your clinic name (example: Secure: [facility])  In the email please include: First name, middle initial, last name and date of birth.    We will be in touch with you within one (1) business day.           Follow-ups after your visit        Who to contact     If you have questions or need follow up information about today's clinic visit or your schedule please contact [facility] directly at [phone].  Normal or non-critical lab and imaging results will be  communicated to you by [employer], letter or phone within 4 business days after the clinic has received the results. If you do not hear from us within 7 days, please contact the clinic through [employer] or phone. If you have a critical or abnormal lab result, we will notify you by phone as soon as possible.  Submit refill requests through [employer] or call your pharmacy and they will forward the refill request to us. Please allow 3 business days for your refill to be completed.          Additional Information About Your Visit        [employer] Information     [employer] gives you secure access to your electronic health record. If you see a primary care provider, you can also send messages to your care team and make appointments. If you have questions, please call your primary care clinic.  If you do not have a primary care provider, please call [phone] and they will assist you.        Care EveryWhere ID     This is your Care EveryWhere ID. This could be used by other organizations to access your [facility] medical records  [id number]        Your Vitals Were     Last Period                   02/01/2010            Blood Pressure from Last 3 Encounters:   12/26/17 120/80   12/18/17 110/60   12/11/17 127/82    Weight from Last 3 Encounters:   12/26/17 131.3 kg (289 lb 8 oz)   12/18/17 133 kg (293 lb 3.2 oz)   12/11/17 130.2 kg (287 lb)              Today, you had the following     No orders found for display       Primary Care Provider Office Phone # Fax #    Vania Ramsey Oneil Almendarez, JACKELINE [facility] [phone]       [address] [facility] [facility] [address]        Equal Access to Services     [facility]: Hadii aad ku hadasho Soomaali, waaxda luqadaha, qaybta kaalmada adeegyada, chelsie arguello . So [facility] [phone].    ATENCIÓN: Si habla español, tiene a eason disposición servicios gratuitos de asistencia lingüística. Llame al [phone].    We comply with applicable federal civil rights laws  and [place] laws. We do not discriminate on the basis of race, color, national origin, age, disability, sex, sexual orientation, or gender identity.            Thank you!     Thank you for choosing [facility]  for your care. Our goal is always to provide you with excellent care. Hearing back from our patients is one way we can continue to improve our services. Please take a few minutes to complete the written survey that you may receive in the mail after your visit with us. Thank you!             Your Updated Medication List - Protect others around you: Learn how to safely use, store and throw away your medicines at www.disposemymeds.org.          This list is accurate as of 3/8/18 10:27 AM.  Always use your most recent med list.                   Brand Name Dispense Instructions for use Diagnosis    AMBIEN 10 MG tablet   Generic drug:  zolpidem      1 TABLET DAILY AT BEDTIME        ASPIRIN NOT PRESCRIBED    INTENTIONAL    0 each    Please choose reason not prescribed, below    Paroxysmal atrial fibrillation (H)       blood glucose monitoring lancets     1 Box    TEST ONCE DAILY OR AS NEEDED *DISPENSE WHATEVER IS COVERED BY INS*    Type 2 diabetes mellitus without complication, without long-term current use of insulin (H)       blood glucose monitoring meter device kit    no brand specified    1 kit    Use to test blood sugar one times daily or as directed.    Type 2 diabetes mellitus without complication, without long-term current use of insulin (H)       blood glucose monitoring test strip    no brand specified    100 strip    Use to test blood sugars one times daily or as directed    Type 2 diabetes mellitus without complication, without long-term current use of insulin (H)       BUSPAR PO      Take 30 mg by mouth 2 times daily        BYDUREON 2 MG pen   Generic drug:  exenatide ER      Inject 2 mg Subcutaneous every 7 days        calcium carbonate 500 MG tablet   Generic drug:   Oyster Shell Calcium     270 tablet    TAKE 1 TABLET BY MOUTH. THREE TIMES DAILY.    Elevated PTHrP level (H)       CVS SPECTRAVITE ULTRA WOMEN Tabs     90 tablet    TAKE 1 TABLET BY MOUTH DAILY    Constipation, chronic       cyanocobalamin 1000 MCG Tabs    [facility]S vitamin  B12    90 tablet    TAKE 1 TABLET (1,000 MCG) BY MOUTH DAILY    Vitamin B12 deficiency (non anemic)       docusate sodium 100 MG capsule    COLACE    180 capsule    TAKE 1 CAPSULE (100 MG) BY MOUTH 2 TIMES DAILY    Constipation, unspecified constipation type       escitalopram 20 MG tablet    LEXAPRO    90 tablet    Take 1 tablet (20 mg) by mouth daily    Paroxysmal atrial fibrillation (H)       Ferrous Sulfate 143 (45 FE) MG Tbcr    CVS SLOW RELEASE IRON    180 tablet    Take 1 tablet by mouth 2 times daily    Iron deficiency       flecainide 50 MG tablet    TAMBOCOR    90 tablet    Take 1.5 tablets (75 mg) by mouth 2 times daily    Atrial fibrillation (H)       furosemide 40 MG tablet    LASIX    90 tablet    TAKE 1 TABLET (40 MG) BY MOUTH DAILY    Pedal edema       insulin pen needle 31G X 5 MM    B-D U/F    100 each    Use once daily or as directed.    Type 2 diabetes mellitus without complication (H)       * levothyroxine 75 MCG tablet    SYNTHROID/LEVOTHROID    60 tablet    TAKE 1 TABLET (75 MG) BY MOUTH EVERY DAY    Acquired hypothyroidism, Vitamin D deficiency, Hyperparathyroidism (H), Abdominal pain, epigastric       * levothyroxine 75 MCG tablet    SYNTHROID/LEVOTHROID    90 tablet    TAKE 1 TABLET (75 MCG) BY MOUTH DAILY    Acquired hypothyroidism, Vitamin D deficiency, Hyperparathyroidism (H), Abdominal pain, epigastric       lisinopril 10 MG tablet    PRINIVIL/ZESTRIL    90 tablet    TAKE 1 TABLET (10 MG) BY MOUTH DAILY    Type 2 diabetes mellitus without complication, without long-term current use of insulin (H), Essential hypertension with goal blood pressure less than 140/90       LORazepam 1 MG tablet    ATIVAN     Take 1 mg by mouth  At Bedtime        metoprolol succinate 100 MG 24 hr tablet    TOPROL-XL    90 tablet    Take 1 tablet (100 mg) by mouth daily    Paroxysmal atrial fibrillation (H), Chest pain, unspecified type       omeprazole 20 MG CR capsule    priLOSEC    180 capsule    Take 1 capsule (20 mg) by mouth 2 times daily    Hiatal hernia       order for DME     1 Box    Test strips for pt's glucometer, brand as covered by insurance. Test twice daily or PRN.    Type 2 diabetes mellitus without complication (H)       potassium chloride SA 20 MEQ CR tablet    KLOR-CON    180 tablet    TAKE 1 TABLET (20 MEQ) BY MOUTH 2 TIMES DAILY    Diuresis       PRADAXA ANTICOAGULANT 150 MG capsule   Generic drug:  dabigatran ANTICOAGULANT     60 capsule    TAKE ONE CAPSULE BY MOUTH TWICE A DAY STORE IN ORIGINAL  BOTTLE  DAYS OF OPENING    Paroxysmal atrial fibrillation (H)       ranitidine 150 MG tablet    ZANTAC    60 tablet    TAKE 1 TABLET (150 MG) BY MOUTH 2 TIMES DAILY    Gastroesophageal reflux disease, esophagitis presence not specified       simvastatin 20 MG tablet    ZOCOR    90 tablet    TAKE 1 TABLET (20 MG) BY MOUTH AT BEDTIME NEED LABS    Hyperlipidemia LDL goal <100       vitamin D 97639 UNIT capsule    ERGOCALCIFEROL    8 capsule    TAKE 1 CAPSULE BY MOUTH ONCE WEEKLY.    Vitamin D deficiency, Hyperparathyroidism (H), Acquired hypothyroidism, Abdominal pain, epigastric       Vitamin D3 3000 UNITS Tabs           * Notice:  This list has 2 medication(s) that are the same as other medications prescribed for you. Read the directions carefully, and ask your doctor or other care provider to review them with you.

## 2018-03-16 DIAGNOSIS — E21.3 HYPERPARATHYROIDISM (H): ICD-10-CM

## 2018-03-16 DIAGNOSIS — E55.9 VITAMIN D DEFICIENCY: ICD-10-CM

## 2018-03-16 DIAGNOSIS — E03.9 ACQUIRED HYPOTHYROIDISM: ICD-10-CM

## 2018-03-16 DIAGNOSIS — R10.13 ABDOMINAL PAIN, EPIGASTRIC: ICD-10-CM

## 2018-03-16 RX ORDER — ERGOCALCIFEROL 1.25 MG/1
CAPSULE, LIQUID FILLED ORAL
Qty: 8 CAPSULE | Refills: 0 | Status: SHIPPED
Start: 2018-03-16

## 2018-03-16 NOTE — TELEPHONE ENCOUNTER
Patient is seeing Endocrinologist in [place].     Patient will update pharmacy with name of new provider.    Refill denied.    Alecia Deleon LPN  Adult Endocrinology  [facility]

## 2018-03-21 DIAGNOSIS — R10.13 ABDOMINAL PAIN, EPIGASTRIC: ICD-10-CM

## 2018-03-21 DIAGNOSIS — E55.9 VITAMIN D DEFICIENCY: ICD-10-CM

## 2018-03-21 DIAGNOSIS — E21.3 HYPERPARATHYROIDISM (H): ICD-10-CM

## 2018-03-21 DIAGNOSIS — E03.9 ACQUIRED HYPOTHYROIDISM: ICD-10-CM

## 2018-03-21 RX ORDER — ERGOCALCIFEROL 1.25 MG/1
CAPSULE, LIQUID FILLED ORAL
Qty: 8 CAPSULE | Refills: 0 | Status: SHIPPED
Start: 2018-03-21

## 2018-03-21 NOTE — TELEPHONE ENCOUNTER
Please refer to 3/16 Refill Encounter.    Refill denied.    Alecia Deleon LPN  Adult Endocrinology  [facility]

## 2018-03-28 ENCOUNTER — OFFICE VISIT (OUTPATIENT)
Dept: OBGYN | Facility: CLINIC | Age: 58
End: 2018-03-28
Payer: COMMERCIAL

## 2018-03-28 VITALS
TEMPERATURE: 97.8 F | HEIGHT: 65 IN | SYSTOLIC BLOOD PRESSURE: 111 MMHG | DIASTOLIC BLOOD PRESSURE: 73 MMHG | BODY MASS INDEX: 48.32 KG/M2 | HEART RATE: 69 BPM | WEIGHT: 290 LBS

## 2018-03-28 DIAGNOSIS — Z01.419 ENCOUNTER FOR GYNECOLOGICAL EXAMINATION WITHOUT ABNORMAL FINDING: Primary | ICD-10-CM

## 2018-03-28 PROBLEM — I48.20 CHRONIC ATRIAL FIBRILLATION (H): Status: ACTIVE | Noted: 2018-03-28

## 2018-03-28 PROCEDURE — 99396 PREV VISIT EST AGE 40-64: CPT | Performed by: OBSTETRICS & GYNECOLOGY

## 2018-03-28 PROCEDURE — G0145 SCR C/V CYTO,THINLAYER,RESCR: HCPCS | Performed by: OBSTETRICS & GYNECOLOGY

## 2018-03-28 PROCEDURE — G0476 HPV COMBO ASSAY CA SCREEN: HCPCS | Performed by: OBSTETRICS & GYNECOLOGY

## 2018-03-28 RX ORDER — BUSPIRONE HYDROCHLORIDE 30 MG/1
TABLET ORAL 2 TIMES DAILY
COMMUNITY

## 2018-03-28 NOTE — MR AVS SNAPSHOT
After Visit Summary   3/28/2018    Gladys Singh    MRN: [id number]           Patient Information     Date Of Birth          1960        Visit Information        Provider Department      3/28/2018 8:30 AM Jerson Martin MD [facility]        Care Instructions                                                        If you have any questions regarding your visit, Please contact your care team.     [facility] Access Services: [phone]    [facility] CLINIC HOURS TELEPHONE NUMBER       YASIR Lee-      Rocio Coto-Medical Assistant       Monday-[place]  8:00a.m-4:45 p.m  Wednesday-[place] 8:00a.m-4:45 p.m.  Thursday-[place]  8:00a.m-4:45 p.m.  Friday-[place]  8:00a.m-4:45 p.m. [facility]  [address] [address] [place], [place] [address] [address]  [phone] ask [facility]  [phone] Fax  Imaging [phone]    [facility] Labor and Delivery  [address] [place] [address] Dr.  [place], [place] [address] [phone]    [facility]  [address] [address] [phone] [facility]  [phone] Fax  Imaging [phone]     Urgent Care locations:    [facility] Monday-Friday  5 pm - 9 pm  Saturday and Sunday   9 am - 5 pm    Monday-Friday   11 am - 9 pm  Saturday and Sunday   9 am - 5 pm   [phone]       If you need a medication refill, please contact your pharmacy. Please allow 3 business days for your refill to be completed.  As always, Thank you for trusting us with your healthcare needs!            Follow-ups after your visit        Who to contact     If you have questions or need follow up information about today's clinic visit or your schedule please contact [facility] directly at [phone].  Normal or non-critical lab and imaging results will be communicated to you by  "MyChart, letter or phone within 4 business days after the clinic has received the results. If you do not hear from us within 7 days, please contact the clinic through [employer] or phone. If you have a critical or abnormal lab result, we will notify you by phone as soon as possible.  Submit refill requests through [employer] or call your pharmacy and they will forward the refill request to us. Please allow 3 business days for your refill to be completed.          Additional Information About Your Visit        [employer] Information     [employer] gives you secure access to your electronic health record. If you see a primary care provider, you can also send messages to your care team and make appointments. If you have questions, please call your primary care clinic.  If you do not have a primary care provider, please call [phone] and they will assist you.        Care EveryWhere ID     This is your Care EveryWhere ID. This could be used by other organizations to access your [facility] medical records  [id number]        Your Vitals Were     Pulse Temperature Height Last Period BMI (Body Mass Index)       69 97.8  F (36.6  C) (Oral) 5' 5.2\" (1.656 m) 02/01/2010 47.96 kg/m2        Blood Pressure from Last 3 Encounters:   03/28/18 111/73   12/26/17 120/80   12/18/17 110/60    Weight from Last 3 Encounters:   03/28/18 290 lb (131.5 kg)   12/26/17 289 lb 8 oz (131.3 kg)   12/18/17 293 lb 3.2 oz (133 kg)              Today, you had the following     No orders found for display         Today's Medication Changes          These changes are accurate as of 3/28/18  8:32 AM.  If you have any questions, ask your nurse or doctor.               Stop taking these medicines if you haven't already. Please contact your care team if you have questions.     escitalopram 20 MG tablet   Commonly known as:  LEXAPRO   Stopped by:  Jerson Martin MD                    Primary Care Provider Office Phone # Fax #    JACKELINE Arenas " [phone]       [address] [facility]  [facility] [address]        Equal Access to Services     OSCAR URBANO : Hadford nemo blandon marshal Sodanny, waamarilysda luqdalia, liseta kaalmada vee, chelsie mata. So [facility] [phone].    ATENCIÓN: Si habla español, tiene a eason disposición servicios gratuitos de asistencia lingüística. Llame al [phone].    We comply with applicable federal civil rights laws and [place] laws. We do not discriminate on the basis of race, color, national origin, age, disability, sex, sexual orientation, or gender identity.            Thank you!     Thank you for choosing [facility]  for your care. Our goal is always to provide you with excellent care. Hearing back from our patients is one way we can continue to improve our services. Please take a few minutes to complete the written survey that you may receive in the mail after your visit with us. Thank you!             Your Updated Medication List - Protect others around you: Learn how to safely use, store and throw away your medicines at www.disposemymeds.org.          This list is accurate as of 3/28/18  8:32 AM.  Always use your most recent med list.                   Brand Name Dispense Instructions for use Diagnosis    AMBIEN 10 MG tablet   Generic drug:  zolpidem      1 TABLET DAILY AT BEDTIME        ASPIRIN NOT PRESCRIBED    INTENTIONAL    0 each    Please choose reason not prescribed, below    Paroxysmal atrial fibrillation (H)       blood glucose monitoring lancets     1 Box    TEST ONCE DAILY OR AS NEEDED *DISPENSE WHATEVER IS COVERED BY INS*    Type 2 diabetes mellitus without complication, without long-term current use of insulin (H)       blood glucose monitoring meter device kit    no brand specified    1 kit    Use to test blood sugar one times daily or as directed.    Type 2 diabetes mellitus without complication, without long-term current use of insulin (H)        * blood glucose monitoring test strip    no brand specified    100 strip    Use to test blood sugars one times daily or as directed    Type 2 diabetes mellitus without complication, without long-term current use of insulin (H)       * ONETOUCH ULTRA test strip   Generic drug:  blood glucose monitoring     100 strip    USE TO TEST BLOOD SUGARS ONE TIMES DAILY OR AS DIRECTED    Type 2 diabetes mellitus without complication, without long-term current use of insulin (H)       * BUSPAR PO      Take 30 mg by mouth 2 times daily        * BusPIRone HCl 30 MG Tabs      Take by mouth 2 times daily        BYDUREON 2 MG pen   Generic drug:  exenatide ER      Inject 2 mg Subcutaneous every 7 days        calcium carbonate 500 MG tablet   Generic drug:  Oyster Shell Calcium     270 tablet    TAKE 1 TABLET BY MOUTH. THREE TIMES DAILY.    Elevated PTHrP level (H)       [facility] SLOW RELEASE IRON 143 (45 FE) MG Tbcr   Generic drug:  Ferrous Sulfate     180 tablet    TAKE 1 TABLET BY MOUTH 2 TIMES DAILY    Iron deficiency       CVS SPECTRAVITE ULTRA WOMEN Tabs     90 tablet    TAKE 1 TABLET BY MOUTH DAILY    Constipation, chronic       cyanocobalamin 1000 MCG Tabs    [facility] vitamin  B12    90 tablet    TAKE 1 TABLET (1,000 MCG) BY MOUTH DAILY    Vitamin B12 deficiency (non anemic)       docusate sodium 100 MG capsule    COLACE    180 capsule    TAKE 1 CAPSULE (100 MG) BY MOUTH 2 TIMES DAILY    Constipation, unspecified constipation type       flecainide 50 MG tablet    TAMBOCOR    90 tablet    Take 1.5 tablets (75 mg) by mouth 2 times daily    Atrial fibrillation (H)       FLUoxetine 20 MG capsule    PROzac     Take 1 capsule by mouth        furosemide 40 MG tablet    LASIX    90 tablet    TAKE 1 TABLET (40 MG) BY MOUTH DAILY    Pedal edema       insulin pen needle 31G X 5 MM    B-D U/F    100 each    Use once daily or as directed.    Type 2 diabetes mellitus without complication (H)       * levothyroxine 75 MCG tablet     SYNTHROID/LEVOTHROID    60 tablet    TAKE 1 TABLET (75 MG) BY MOUTH EVERY DAY    Acquired hypothyroidism, Vitamin D deficiency, Hyperparathyroidism (H), Abdominal pain, epigastric       * levothyroxine 75 MCG tablet    SYNTHROID/LEVOTHROID    90 tablet    TAKE 1 TABLET (75 MCG) BY MOUTH DAILY    Acquired hypothyroidism, Vitamin D deficiency, Hyperparathyroidism (H), Abdominal pain, epigastric       lisinopril 10 MG tablet    PRINIVIL/ZESTRIL    90 tablet    TAKE 1 TABLET (10 MG) BY MOUTH DAILY    Type 2 diabetes mellitus without complication, without long-term current use of insulin (H), Essential hypertension with goal blood pressure less than 140/90       LORazepam 1 MG tablet    ATIVAN     Take 1 mg by mouth At Bedtime        metoprolol succinate 100 MG 24 hr tablet    TOPROL-XL    90 tablet    Take 1 tablet (100 mg) by mouth daily    Paroxysmal atrial fibrillation (H), Chest pain, unspecified type       omeprazole 20 MG CR capsule    priLOSEC    180 capsule    Take 1 capsule (20 mg) by mouth 2 times daily    Hiatal hernia       order for DME     1 Box    Test strips for pt's glucometer, brand as covered by insurance. Test twice daily or PRN.    Type 2 diabetes mellitus without complication (H)       * potassium chloride SA 20 MEQ CR tablet    KLOR-CON    180 tablet    TAKE 1 TABLET (20 MEQ) BY MOUTH 2 TIMES DAILY    Diuresis       * KLOR-CON 20 MEQ CR tablet   Generic drug:  potassium chloride SA     180 tablet    TAKE 1 TABLET (20 MEQ) BY MOUTH 2 TIMES DAILY    Diuresis       PRADAXA ANTICOAGULANT 150 MG capsule   Generic drug:  dabigatran ANTICOAGULANT     60 capsule    TAKE ONE CAPSULE BY MOUTH TWICE A DAY STORE IN ORIGINAL  BOTTLE  DAYS OF OPENING    Paroxysmal atrial fibrillation (H)       ranitidine 150 MG tablet    ZANTAC    60 tablet    TAKE 1 TABLET (150 MG) BY MOUTH 2 TIMES DAILY    Gastroesophageal reflux disease, esophagitis presence not specified       simvastatin 20 MG tablet     ZOCOR    90 tablet    TAKE 1 TABLET (20 MG) BY MOUTH AT BEDTIME NEED LABS    Hyperlipidemia LDL goal <100       * vitamin D 24729 UNIT capsule    ERGOCALCIFEROL    8 capsule    TAKE 1 CAPSULE BY MOUTH ONCE WEEKLY.    Vitamin D deficiency, Hyperparathyroidism (H), Acquired hypothyroidism, Abdominal pain, epigastric       * vitamin D 06229 UNIT capsule    ERGOCALCIFEROL    8 capsule    TAKE 1 CAPSULE BY MOUTH ONCE WEEKLY.    Vitamin D deficiency, Hyperparathyroidism (H), Acquired hypothyroidism, Abdominal pain, epigastric       Vitamin D3 3000 UNITS Tabs           * Notice:  This list has 10 medication(s) that are the same as other medications prescribed for you. Read the directions carefully, and ask your doctor or other care provider to review them with you.

## 2018-03-28 NOTE — NURSING NOTE
"Chief Complaint   Patient presents with     Physical     AFE       Initial /73 (BP Location: Left arm, Patient Position: Chair, Cuff Size: Adult Large)  Pulse 69  Temp 97.8  F (36.6  C) (Oral)  Ht 5' 5.2\" (1.656 m)  Wt 290 lb (131.5 kg)  LMP 02/01/2010  BMI 47.96 kg/m2 Estimated body mass index is 47.96 kg/(m^2) as calculated from the following:    Height as of this encounter: 5' 5.2\" (1.656 m).    Weight as of this encounter: 290 lb (131.5 kg).  Medication Reconciliation: complete   Nnamdi Messina CMA      "

## 2018-03-28 NOTE — PATIENT INSTRUCTIONS
If you have any questions regarding your visit, Please contact your care team.     [facility]: [phone]    [facility] CLINIC HOURS TELEPHONE NUMBER       YASIR Lee-      Rocio Coto-Medical Assistant       Monday-[place]  8:00a.m-4:45 p.m  Wednesday-[place] 8:00a.m-4:45 p.m.  Thursday-[place]  8:00a.m-4:45 p.m.  Friday-[place]  8:00a.m-4:45 p.m. [facility]  [address] [place] [address]  [phone] ask [facility]  [phone] Fax  Imaging [phone]    [facility] Labor and Delivery  [address] [place] [address] [place] [address]  [phone]    [facility]  [address] [place] [address]  [phone] [facility]  [phone] Fax  Imaging [phone]     Urgent Care locations:    [place]        [place] Monday-Friday  5 pm - 9 pm  Saturday and Sunday   9 am - 5 pm    Monday-Friday   11 am - 9 pm  Saturday and Sunday   9 am - 5 pm   [phone]       If you need a medication refill, please contact your pharmacy. Please allow 3 business days for your refill to be completed.  As always, Thank you for trusting us with your healthcare needs!

## 2018-03-29 NOTE — PROGRESS NOTES
"Gladys Singh is a 58 year old year old who presents for annual exam. Patient's last menstrual period was 02/01/2010.    HPI: Doing fair.    Significant interval changes: Developed Afib and had ablation at [facility] and continues on med per Cardiology. Continues other subspecialty care but has new Endocrinologist.   Current significant symptoms: chronic knee pain and due for another cortisone inj. Had not been able to return to swimming yet.   Other problems or concerns: Pap/HRHPV follow-up due.    Past medical, obstetrical, surgical, family and social history reviewed and as noted or updated in chart.     Allergies, meds and supplements are as noted or updated in chart.     ROS:     Systems reviewed include constitutional; breast;                 cardiac; respiratory; gastrointestinal; genitourinary;                                musculoskeletal; integumentary; psychological;                                hematologic/lymphatic and endocrine.                  These systems were negative for significant symptoms except                 for the following additional: none ; see HPI.                                Exam:  VS as noted./73 (BP Location: Left arm, Patient Position: Chair, Cuff Size: Adult Large)  Pulse 69  Temp 97.8  F (36.6  C) (Oral)  Ht 5' 5.2\" (1.656 m)  Wt 290 lb (131.5 kg)  LMP 02/01/2010  BMI 47.96 kg/m2                    Neck, nodes, breasts, abd and pelvis were                             normal or negative except for, or in particular noting, the following                pertinent findings: long narrow speculum used.      Assessment: Gyn exam. Problem List updated.    Plan and Recommendations: Symptoms, problems and concerns reviewed. Health habits and vaccinations reviewed. Calcium/Vitamin D and multi-vitamin supplements instructions given. Breast/skin self-exam awareness. Screening tests including limitations discussed. Follow-up visits discussed. See orders. Mammogram in May. " If this Pap/HRHPV neg then repeat in 2-3 yrs. Continue other med care including IM.    Gladys was seen today for physical.    Diagnoses and all orders for this visit:    Encounter for gynecological examination without abnormal finding  -     Pap imaged thin layer screen with HPV - recommended age 30 - 65 years (select HPV order below)  -     HPV High Risk Types DNA Cervical        Jerson Martin MD

## 2018-03-30 LAB
COPATH REPORT: NORMAL
PAP: NORMAL

## 2018-04-03 ENCOUNTER — OFFICE VISIT (OUTPATIENT)
Dept: ORTHOPEDICS | Facility: CLINIC | Age: 58
End: 2018-04-03
Payer: COMMERCIAL

## 2018-04-03 ENCOUNTER — RADIANT APPOINTMENT (OUTPATIENT)
Dept: GENERAL RADIOLOGY | Facility: CLINIC | Age: 58
End: 2018-04-03
Attending: ORTHOPAEDIC SURGERY
Payer: COMMERCIAL

## 2018-04-03 VITALS — DIASTOLIC BLOOD PRESSURE: 80 MMHG | SYSTOLIC BLOOD PRESSURE: 138 MMHG | HEART RATE: 78 BPM | RESPIRATION RATE: 12 BRPM

## 2018-04-03 DIAGNOSIS — T84.84XD PAIN DUE TO TOTAL KNEE REPLACEMENT, SUBSEQUENT ENCOUNTER: Primary | ICD-10-CM

## 2018-04-03 DIAGNOSIS — M25.562 CHRONIC PAIN OF LEFT KNEE: ICD-10-CM

## 2018-04-03 DIAGNOSIS — G89.29 CHRONIC PAIN OF LEFT KNEE: ICD-10-CM

## 2018-04-03 DIAGNOSIS — Z96.659 PAIN DUE TO TOTAL KNEE REPLACEMENT, SUBSEQUENT ENCOUNTER: Primary | ICD-10-CM

## 2018-04-03 DIAGNOSIS — M70.42 PREPATELLAR BURSITIS OF LEFT KNEE: ICD-10-CM

## 2018-04-03 DIAGNOSIS — M25.562 LEFT KNEE PAIN: ICD-10-CM

## 2018-04-03 LAB
FINAL DIAGNOSIS: NORMAL
HPV HR 12 DNA CVX QL NAA+PROBE: NEGATIVE
HPV16 DNA SPEC QL NAA+PROBE: NEGATIVE
HPV18 DNA SPEC QL NAA+PROBE: NEGATIVE
SPECIMEN DESCRIPTION: NORMAL
SPECIMEN SOURCE CVX/VAG CYTO: NORMAL

## 2018-04-03 PROCEDURE — 73562 X-RAY EXAM OF KNEE 3: CPT | Mod: LT

## 2018-04-03 PROCEDURE — 99213 OFFICE O/P EST LOW 20 MIN: CPT | Performed by: ORTHOPAEDIC SURGERY

## 2018-04-03 ASSESSMENT — PAIN SCALES - GENERAL: PAINLEVEL: MODERATE PAIN (5)

## 2018-04-03 NOTE — MR AVS SNAPSHOT
After Visit Summary   4/3/2018    Gladys Singh    MRN: [id number]           Patient Information     Date Of Birth          1960        Visit Information        Provider Department      4/3/2018 9:30 AM Dante Wong MD [facility] Instructions    Cortisone Injections  Cortisone is a type of steroid. It can greatly reduce inflammation (swelling, redness, and irritation). Being injected with cortisone is simple and doesn t take long. But your doctor may ask you questions about your health. Certain medical conditions, such as diabetes, can be affected by cortisone.     Your pain may be relieved by a cortisone injection.    Why Have a Cortisone Injection?  Injecting cortisone can relieve pain for anything from a sports injury to arthritis. Your doctor may suggest an injection if rest, splints, or oral medication doesn t relieve your pain. Injecting cortisone is simpler than having surgery. And cortisone may provide the lasting pain relief that can help you get out and enjoy life again.  Getting the Injection  Your injection will  start by cleaning and numbing your skin at the injection site. Next, you ll be injected with local anesthetics (for short-term pain relief) and cortisone. The injection may last a few moments. A small bandage will be applied over the injection site. You ll then be ready to go home.  After Your Injection  After being injected, make sure you don t injure the treated region. But stay active. Enjoy a walk or some other mild activity. Just be careful not to strain the region that gave you trouble.  The Next Day or Two  Some patients feel more pain after being injected. This is normal, and it will go away soon. Applying ice for 20 minutes at a time to your injury may reduce the increased pain. Rest for the first day or two. You don t need to stay in bed. But avoid tasks that may strain the injured region.  If You Have Diabetes  Cortisone  "injections can cause blood sugar to be increased for several days after the injection. Follow your regular plan for what to do when your blood sugar is elevated.     You may have the area injected, in general, every three to four months.  This is the estimated amount of time that the body takes to metabolize the \"cortisone.\"  Getting injections too frequently may result in a softening of the cartilage and cause the joint to actually wear out more quickly.          Follow-ups after your visit        Your next 10 appointments already scheduled     Apr 03, 2018  9:30 AM CDT   New Visit with Dante Wong MD   [facility] ([facility]    [address] [address] [phone]              Who to contact     If you have questions or need follow up information about today's clinic visit or your schedule please contact [facility] directly at [phone].  Normal or non-critical lab and imaging results will be communicated to you by MyChart, letter or phone within 4 business days after the clinic has received the results. If you do not hear from us within 7 days, please contact the clinic through [employer] or phone. If you have a critical or abnormal lab result, we will notify you by phone as soon as possible.  Submit refill requests through [email or web address] or call your pharmacy and they will forward the refill request to us. Please allow 3 business days for your refill to be completed.          Additional Information About Your Visit        [employer] Information     [email or web address] gives you secure access to your electronic health record. If you see a primary care provider, you can also send messages to your care team and make appointments. If you have questions, please call your primary care clinic.  If you do not have a primary care provider, please call [phone] and they will assist you.        Care EveryWhere ID     This is your Care EveryWhere ID. This could be " used by other organizations to access your [place] medical records  [id number]        Your Vitals Were     Pulse Respirations Last Period             78 12 02/01/2010          Blood Pressure from Last 3 Encounters:   04/03/18 138/80   03/28/18 111/73   12/26/17 120/80    Weight from Last 3 Encounters:   03/28/18 131.5 kg (290 lb)   12/26/17 131.3 kg (289 lb 8 oz)   12/18/17 133 kg (293 lb 3.2 oz)              Today, you had the following     No orders found for display       Primary Care Provider Office Phone # Fax #    Vania Monroe, APRN [facility] [phone]       [address] [address]        Equal Access to Services     OSCAR URBANO : Hadii nemo blandon hadasho Soomaali, waaxda luqadaha, qaybta kaalmada adeegyada, waxjanie idiin haylinda arguello . So [facility] [phone].    ATENCIÓN: Si habla español, tiene a eason disposición servicios gratuitos de asistencia lingüística. Llame al [phone].    We comply with applicable federal civil rights laws and [place] laws. We do not discriminate on the basis of race, color, national origin, age, disability, sex, sexual orientation, or gender identity.            Thank you!     Thank you for choosing [facility]  for your care. Our goal is always to provide you with excellent care. Hearing back from our patients is one way we can continue to improve our services. Please take a few minutes to complete the written survey that you may receive in the mail after your visit with us. Thank you!             Your Updated Medication List - Protect others around you: Learn how to safely use, store and throw away your medicines at www.disposemymeds.org.          This list is accurate as of 4/3/18  9:23 AM.  Always use your most recent med list.                   Brand Name Dispense Instructions for use Diagnosis    AMBIEN 10 MG tablet   Generic drug:  zolpidem      1 TABLET DAILY AT BEDTIME        ASPIRIN NOT PRESCRIBED     INTENTIONAL    0 each    Please choose reason not prescribed, below    Paroxysmal atrial fibrillation (H)       blood glucose monitoring lancets     1 Box    TEST ONCE DAILY OR AS NEEDED *DISPENSE WHATEVER IS COVERED BY INS*    Type 2 diabetes mellitus without complication, without long-term current use of insulin (H)       blood glucose monitoring meter device kit    no brand specified    1 kit    Use to test blood sugar one times daily or as directed.    Type 2 diabetes mellitus without complication, without long-term current use of insulin (H)       * blood glucose monitoring test strip    no brand specified    100 strip    Use to test blood sugars one times daily or as directed    Type 2 diabetes mellitus without complication, without long-term current use of insulin (H)       * ONETOUCH ULTRA test strip   Generic drug:  blood glucose monitoring     100 strip    USE TO TEST BLOOD SUGARS ONE TIMES DAILY OR AS DIRECTED    Type 2 diabetes mellitus without complication, without long-term current use of insulin (H)       * BUSPAR PO      Take 30 mg by mouth 2 times daily        * BusPIRone HCl 30 MG Tabs      Take by mouth 2 times daily        BYDUREON 2 MG pen   Generic drug:  exenatide ER      Inject 2 mg Subcutaneous every 7 days        calcium carbonate 500 MG tablet   Generic drug:  Oyster Shell Calcium     270 tablet    TAKE 1 TABLET BY MOUTH. THREE TIMES DAILY.    Elevated PTHrP level (H)       [facility] SLOW RELEASE IRON 143 (45 FE) MG Tbcr   Generic drug:  Ferrous Sulfate     180 tablet    TAKE 1 TABLET BY MOUTH 2 TIMES DAILY    Iron deficiency       CVS SPECTRAVITE ULTRA WOMEN Tabs     90 tablet    TAKE 1 TABLET BY MOUTH DAILY    Constipation, chronic       cyanocobalamin 1000 MCG Tabs    [facility]S vitamin  B12    90 tablet    TAKE 1 TABLET (1,000 MCG) BY MOUTH DAILY    Vitamin B12 deficiency (non anemic)       docusate sodium 100 MG capsule    COLACE    180 capsule    TAKE 1 CAPSULE (100 MG) BY MOUTH 2 TIMES DAILY     Constipation, unspecified constipation type       flecainide 50 MG tablet    TAMBOCOR    90 tablet    Take 1.5 tablets (75 mg) by mouth 2 times daily    Atrial fibrillation (H)       FLUoxetine 20 MG capsule    PROzac     Take 1 capsule by mouth        furosemide 40 MG tablet    LASIX    90 tablet    TAKE 1 TABLET (40 MG) BY MOUTH DAILY    Pedal edema       insulin pen needle 31G X 5 MM    B-D U/F    100 each    Use once daily or as directed.    Type 2 diabetes mellitus without complication (H)       * levothyroxine 75 MCG tablet    SYNTHROID/LEVOTHROID    60 tablet    TAKE 1 TABLET (75 MG) BY MOUTH EVERY DAY    Acquired hypothyroidism, Vitamin D deficiency, Hyperparathyroidism (H), Abdominal pain, epigastric       * levothyroxine 75 MCG tablet    SYNTHROID/LEVOTHROID    90 tablet    TAKE 1 TABLET (75 MCG) BY MOUTH DAILY    Acquired hypothyroidism, Vitamin D deficiency, Hyperparathyroidism (H), Abdominal pain, epigastric       lisinopril 10 MG tablet    PRINIVIL/ZESTRIL    90 tablet    TAKE 1 TABLET (10 MG) BY MOUTH DAILY    Type 2 diabetes mellitus without complication, without long-term current use of insulin (H), Essential hypertension with goal blood pressure less than 140/90       LORazepam 1 MG tablet    ATIVAN     Take 1 mg by mouth At Bedtime        metoprolol succinate 100 MG 24 hr tablet    TOPROL-XL    90 tablet    Take 1 tablet (100 mg) by mouth daily    Paroxysmal atrial fibrillation (H), Chest pain, unspecified type       omeprazole 20 MG CR capsule    priLOSEC    180 capsule    Take 1 capsule (20 mg) by mouth 2 times daily    Hiatal hernia       order for DME     1 Box    Test strips for pt's glucometer, brand as covered by insurance. Test twice daily or PRN.    Type 2 diabetes mellitus without complication (H)       * potassium chloride SA 20 MEQ CR tablet    KLOR-CON    180 tablet    TAKE 1 TABLET (20 MEQ) BY MOUTH 2 TIMES DAILY    Diuresis       * KLOR-CON 20 MEQ CR tablet   Generic drug:   potassium chloride SA     180 tablet    TAKE 1 TABLET (20 MEQ) BY MOUTH 2 TIMES DAILY    Diuresis       PRADAXA ANTICOAGULANT 150 MG capsule   Generic drug:  dabigatran ANTICOAGULANT     60 capsule    TAKE ONE CAPSULE BY MOUTH TWICE A DAY STORE IN ORIGINAL  BOTTLE  DAYS OF OPENING    Paroxysmal atrial fibrillation (H)       ranitidine 150 MG tablet    ZANTAC    60 tablet    TAKE 1 TABLET (150 MG) BY MOUTH 2 TIMES DAILY    Gastroesophageal reflux disease, esophagitis presence not specified       simvastatin 20 MG tablet    ZOCOR    90 tablet    TAKE 1 TABLET (20 MG) BY MOUTH AT BEDTIME NEED LABS    Hyperlipidemia LDL goal <100       * vitamin D 47213 UNIT capsule    ERGOCALCIFEROL    8 capsule    TAKE 1 CAPSULE BY MOUTH ONCE WEEKLY.    Vitamin D deficiency, Hyperparathyroidism (H), Acquired hypothyroidism, Abdominal pain, epigastric       * vitamin D 04966 UNIT capsule    ERGOCALCIFEROL    8 capsule    TAKE 1 CAPSULE BY MOUTH ONCE WEEKLY.    Vitamin D deficiency, Hyperparathyroidism (H), Acquired hypothyroidism, Abdominal pain, epigastric       Vitamin D3 3000 UNITS Tabs           * Notice:  This list has 10 medication(s) that are the same as other medications prescribed for you. Read the directions carefully, and ask your doctor or other care provider to review them with you.

## 2018-04-03 NOTE — PROGRESS NOTES
HISTORY OF PRESENT ILLNESS    Gladys Singh is a 57 year old female follow up of painful left TKA 2004. She had a lateral release a couple of years ago on the left knee. She had a second IT band injection 1 year ago. The injection helped the lateral knee pain for 10 months. She started having anterior knee pain 1 month ago and had a fall on ice 2 weeks ago which made the pain worse. She doesn't remember if she fell on her knee but was unable to get up. When she dislocated her patella in 2015, she didn't realize that the patella was dislocated until the x-rays.    Present symptoms: anterior knee pain    Ortho PMH: Previous history of bilateral TKA in 2004 with Dr. Prasad @ [facility] . Following surgery, had plain films that demonstrated atraumatic patellar dislocation. Had arthroscopic lateral retinacular release with post op hematoma    Review of Systems:  Constitutional/General: Negative for fever, chills, change in weight  Integumentary/Skin: Negative for worrisome rashes, moles, or lesions  Neuro: Negative for weakness, dizziness, or paresthesias   Psychiatric: negative for changes in mood or affect    Physical Exam:  /80 (BP Location: Left arm, Patient Position: Sitting, Cuff Size: Adult Regular)  Pulse 78  Resp 12  LMP 02/01/2010  General Appearance: healthy, alert and no distress   Skin: no suspicious lesions or rashes  Neuro: Normal strength and tone, mentation intact and speech normal  Vascular: good pulses, and cappillary refill   Lymph: no lymphadenopathy   Psych:  mentation appears normal and affect normal/bright  Resp: no increased work of breathing    Left Knee Exam:   Gait: walks with a normal gait  Patellofemoral joint: Some clicking with   Tender: medial and lateral joint line tenderness similar to the right side  Prepatellar bursa tenderness and swelling  ROM: 0-125 degrees  Ligaments: collateral ligaments stable  Apprehension: negative    X-RAY: Obtained today, 04/03/18, of the LEFT  KNEE: 3-views, reviewed in the office with the patient by myself today and show components in good position. The patella is in proper alignment in the groove. No evidence of loosening of the components. No loss of the polyethylene spacer height.     Impression:   1. Painful left TOTAL KNEE ARTHROPLASTY, chronic.  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  2. Knee contusion probably some prepatellar bursitis    Plan:   PT was offered but declined by the patient. The patient will work on strengthening exercises for her knee and stretching exercises for her IT band. If the lateral knee pain/IT band pain returns, we will repeat the IT band injection. All questions were answered.    Return to clinic: MICAH Wong MD  Dept. Orthopedic Surgery  [facility]    This document serves as a record of the services and decisions personally performed and made by Dr. KATHRINE Wong MD. It was created on his behalf by Jordan Almonte, a trained medical scribe. The creation of this record is based on the provider's personal observations and the statements of the patient. This document has been checked and approved by the attending provider.   Jordan Almonte April 3, 2018 10:44 AM

## 2018-04-03 NOTE — LETTER
4/3/2018         RE: Gladys Singh  [address]  [place] [address]        Dear Colleague,    Thank you for referring your patient, Gladys Singh, to the [facility]. Please see a copy of my visit note below.    HISTORY OF PRESENT ILLNESS    Gladys Singh is a 57 year old female follow up of painful left TKA 2004. She had a lateral release a couple of years ago on the left knee. She had a second IT band injection 1 year ago. The injection helped the lateral knee pain for 10 months. She started having anterior knee pain 1 month ago and had a fall on ice 2 weeks ago which made the pain worse. She doesn't remember if she fell on her knee but was unable to get up. When she dislocated her patella in 2015, she didn't realize that the patella was dislocated until the x-rays.    Present symptoms: anterior knee pain    Ortho PMH: Previous history of bilateral TKA in 2004 with Dr. Prasad @ [facility] . Following surgery, had plain films that demonstrated atraumatic patellar dislocation. Had arthroscopic lateral retinacular release with post op hematoma    Review of Systems:  Constitutional/General: Negative for fever, chills, change in weight  Integumentary/Skin: Negative for worrisome rashes, moles, or lesions  Neuro: Negative for weakness, dizziness, or paresthesias   Psychiatric: negative for changes in mood or affect    Physical Exam:  /80 (BP Location: Left arm, Patient Position: Sitting, Cuff Size: Adult Regular)  Pulse 78  Resp 12  LMP 02/01/2010  General Appearance: healthy, alert and no distress   Skin: no suspicious lesions or rashes  Neuro: Normal strength and tone, mentation intact and speech normal  Vascular: good pulses, and cappillary refill   Lymph: no lymphadenopathy   Psych:  mentation appears normal and affect normal/bright  Resp: no increased work of breathing    Left Knee Exam:   Gait: walks with a normal gait  Patellofemoral joint: Some clicking  with   Tender: medial and lateral joint line tenderness similar to the right side  Prepatellar bursa tenderness and swelling  ROM: 0-125 degrees  Ligaments: collateral ligaments stable  Apprehension: negative    X-RAY: Obtained today, 04/03/18, of the LEFT KNEE: 3-views, reviewed in the office with the patient by myself today and show components in good position. The patella is in proper alignment in the groove. No evidence of loosening of the components. No loss of the polyethylene spacer height.     Impression:   1. Painful left TOTAL KNEE ARTHROPLASTY, chronic.  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  2. Knee contusion probably some prepatellar bursitis    Plan:   PT was offered but declined by the patient. The patient will work on strengthening exercises for her knee and stretching exercises for her IT band. If the lateral knee pain/IT band pain returns, we will repeat the IT band injection. All questions were answered.    Return to clinic: MICAH Wong MD  Dept. Orthopedic Surgery  [facility]    This document serves as a record of the services and decisions personally performed and made by Dr. KATHRINE Wong MD. It was created on his behalf by Jordan Almonte, a trained medical scribe. The creation of this record is based on the provider's personal observations and the statements of the patient. This document has been checked and approved by the attending provider.   Jordan Almonte April 3, 2018 10:44 AM    Again, thank you for allowing me to participate in the care of your patient.        Sincerely,        Dante Wong MD

## 2018-04-03 NOTE — PROGRESS NOTES
"HISTORY OF PRESENT ILLNESS    Gladys Singh is a 58 year old female follow up of  {LEFT/RIGHT:985938} knee {INJURY/PAIN:795508}.  Present symptoms: {SYMPTOMS:305307}.  Symptoms occur {PAINWHEN:108731}.  The symptoms occur {ORTHO SYMPTOM FREQUENCY:273182}.  Pain location: ***    KNEE EXAM:   Gait: {ORTHOGAIT:230620}  Alignment: {Ortho Alignment:684444}  Squat: *** % limited by ***.    ROM: {ORTHO ROM:647075}  Effusion: {MILD/MODERATE:684903}  Tender: {tenderness:053468}  McMurrays: {NE}  Ligaments:  Lachman's Stable, Anterior and posterior drawer stable, stable to varus and valgus stress.  {MILD MOD:565669} pain with *** stress testing.    Patellofemoral joint: {MILD MOD:523165} crepitations in the patellofemoral joint.    Lateral retinaculum {tightness:445226::\"is not tight\"}  Facet Tenderness: {MEDIAL:281433}  Apprehension: negative  Quad atrophy: ***      X-RAY:  From today 4/3/2018: {FINDINGS:526554}    MRI: shows {ORTHO MRI FINDINGS:067031}     Impression: ***    Plan: ***    Return to clinic in {NUMBER:228426}  ***    KATHRINE Wong MD  Dept. Orthopedic Surgery  [facility]    "

## 2018-04-27 ENCOUNTER — TRANSFERRED RECORDS (OUTPATIENT)
Dept: HEALTH INFORMATION MANAGEMENT | Facility: CLINIC | Age: 58
End: 2018-04-27

## 2018-05-14 ENCOUNTER — TRANSFERRED RECORDS (OUTPATIENT)
Dept: HEALTH INFORMATION MANAGEMENT | Facility: CLINIC | Age: 58
End: 2018-05-14

## 2018-05-30 NOTE — PROGRESS NOTES
Subjective:  HPI                    Objective:  System    Physical Exam    General     ROS    Assessment/Plan:    DISCHARGE REPORT    Patient did not return to PT following the initial evaluation. See below for the most recent subjective and objective findings from that session.

## 2018-06-14 ENCOUNTER — TELEPHONE (OUTPATIENT)
Dept: FAMILY MEDICINE | Facility: CLINIC | Age: 58
End: 2018-06-14

## 2018-06-14 NOTE — TELEPHONE ENCOUNTER
Panel Management Review      Patient has the following on her problem list:     Depression / Dysthymia review    Measure:  Needs PHQ-9 score of 4 or less during index window.  Administer PHQ-9 and if score is 5 or more, send encounter to provider for next steps.    5 - 7 month window range    PHQ-9 SCORE 9/1/2017 12/8/2017 12/18/2017   Total Score - - -   Total Score 3 7 4       If PHQ-9 recheck is 5 or more, route to provider for next steps.    Patient is due for:  PHQ9 and DAP    Diabetes    ASA: Passed    Last A1C  Lab Results   Component Value Date    A1C 6.0 12/06/2016    A1C 5.8 08/09/2016    A1C 6.4 04/07/2016    A1C 5.9 06/15/2015    A1C 5.9 02/27/2015     A1C tested: FAILED    Last LDL:    Lab Results   Component Value Date    CHOL 153 07/07/2016     Lab Results   Component Value Date    HDL 39 07/07/2016     Lab Results   Component Value Date    LDL 85 09/01/2017    LDL 77 07/07/2016     Lab Results   Component Value Date    TRIG 185 07/07/2016     Lab Results   Component Value Date    CHOLHDLRATIO 3.2 02/27/2015     Lab Results   Component Value Date    NHDL 114 07/07/2016       Is the patient on a Statin? YES             Is the patient on Aspirin? YES    Medications     HMG CoA Reductase Inhibitors    simvastatin (ZOCOR) 20 MG tablet          Last three blood pressure readings:  BP Readings from Last 3 Encounters:   04/03/18 138/80   03/28/18 111/73   12/26/17 120/80       Date of last diabetes office visit: 1/2/18     Tobacco History:     History   Smoking Status     Former Smoker     Packs/day: 1.50     Years: 13.00     Quit date: 1/1/1994   Smokeless Tobacco     Never Used           Composite cancer screening  Chart review shows that this patient is due/due soon for the following None  Summary:    Patient is due/failing the following:   A1C, DAP and PHQ9    Action needed:   Patient needs office visit for Diabetes. and Patient needs to do PHQ9.    Type of outreach:    Sent letter.    Questions for  provider review:    None                                                                                                                                    Carlota Smith [facility]        Chart routed to None .

## 2018-06-14 NOTE — LETTER
June 14, 2018          Gladys Singh,  [address] [place] [address]        Dear Gladys Singh      Monitoring and managing your preventative and chronic health conditions are very important to us. Our records indicate that you have not scheduled for Appointment with your provider, Diabetic Check, HgbA1C and Depression Check  which was recommended by Vania Monroe.       If you have received your health care elsewhere, please call the clinic so the information can be documented in your chart.    Please call [phone] or message us through your [facility] account to schedule an appointment or provide information for your chart.     Feel free to contact us if you have any questions or concerns!    I look forward to seeing you and working with you on your health care needs. Please fill put the PHQ9 questionnaire and bring to your appointment.     Sincerely,         Vania Monroe / Carlota Smith CMA

## 2018-08-05 DIAGNOSIS — K59.00 CONSTIPATION, UNSPECIFIED CONSTIPATION TYPE: ICD-10-CM

## 2018-08-05 DIAGNOSIS — E78.5 HYPERLIPIDEMIA LDL GOAL <100: ICD-10-CM

## 2018-08-06 RX ORDER — DOCUSATE SODIUM 100 MG/1
CAPSULE, LIQUID FILLED ORAL
Qty: 180 CAPSULE | Refills: 1 | Status: SHIPPED | OUTPATIENT
Start: 2018-08-06

## 2018-08-06 RX ORDER — SIMVASTATIN 20 MG
TABLET ORAL
Qty: 90 TABLET | Refills: 1 | Status: SHIPPED | OUTPATIENT
Start: 2018-08-06

## 2018-08-06 NOTE — TELEPHONE ENCOUNTER
Signed Prescriptions:                        Disp   Refills    docusate sodium (COLACE) 100 MG capsule    180 ca*1        Sig: TAKE 1 CAPSULE (100 MG) BY MOUTH 2 TIMES DAILY  Authorizing Provider: NAKUL MONK  Ordering User: MELISSA HERNANDEZ    simvastatin (ZOCOR) 20 MG tablet           90 tab*1        Sig: TAKE 1 TABLET BY MOUTH AT BEDTIME.  Authorizing Provider: NAKUL MONK  Ordering User: MELISSA HERNANDEZ RN refilled medication per [facility] [place].     Melissa Hernandez RN

## 2018-08-21 DIAGNOSIS — E11.9 TYPE 2 DIABETES MELLITUS WITHOUT COMPLICATION, WITHOUT LONG-TERM CURRENT USE OF INSULIN (H): ICD-10-CM

## 2018-09-04 DIAGNOSIS — E53.8 VITAMIN B12 DEFICIENCY (NON ANEMIC): ICD-10-CM

## 2018-09-05 RX ORDER — OMEGA-3/DHA/EPA/FISH OIL 35-113.5MG
TABLET,CHEWABLE ORAL
Qty: 90 TABLET | Refills: 0 | Status: SHIPPED | OUTPATIENT
Start: 2018-09-05

## 2018-09-14 ENCOUNTER — OFFICE VISIT (OUTPATIENT)
Dept: FAMILY MEDICINE | Facility: CLINIC | Age: 58
End: 2018-09-14
Payer: COMMERCIAL

## 2018-09-14 DIAGNOSIS — Z23 NEED FOR PROPHYLACTIC VACCINATION AND INOCULATION AGAINST INFLUENZA: Primary | ICD-10-CM

## 2018-09-14 PROCEDURE — 90682 RIV4 VACC RECOMBINANT DNA IM: CPT | Performed by: NURSE PRACTITIONER

## 2018-09-14 PROCEDURE — 90471 IMMUNIZATION ADMIN: CPT | Performed by: NURSE PRACTITIONER

## 2018-09-14 NOTE — PROGRESS NOTES

## 2018-09-14 NOTE — MR AVS SNAPSHOT
After Visit Summary   9/14/2018    Gladys Singh    MRN: [id number]           Patient Information     Date Of Birth          1960        Visit Information        Provider Department      9/14/2018 12:20 PM Vania Monroe APRN CNP [facility]        Today's Diagnoses     Need for prophylactic vaccination and inoculation against influenza    -  1       Follow-ups after your visit        Who to contact     If you have questions or need follow up information about today's clinic visit or your schedule please contact [facility] directly at [phone].  Normal or non-critical lab and imaging results will be communicated to you by TopFunhart, letter or phone within 4 business days after the clinic has received the results. If you do not hear from us within 7 days, please contact the clinic through TopFunhart or phone. If you have a critical or abnormal lab result, we will notify you by phone as soon as possible.  Submit refill requests through [facility] or call your pharmacy and they will forward the refill request to us. Please allow 3 business days for your refill to be completed.          Additional Information About Your Visit        MyChart Information     [facility] gives you secure access to your electronic health record. If you see a primary care provider, you can also send messages to your care team and make appointments. If you have questions, please call your primary care clinic.  If you do not have a primary care provider, please call [phone] and they will assist you.        Care EveryWhere ID     This is your Care EveryWhere ID. This could be used by other organizations to access your [place] medical records  [id number]        Your Vitals Were     Last Period                   02/01/2010            Blood Pressure from Last 3 Encounters:   04/03/18 138/80   03/28/18 111/73   12/26/17 120/80    Weight from Last 3 Encounters:   03/28/18 290 lb (131.5  kg)   12/26/17 289 lb 8 oz (131.3 kg)   12/18/17 293 lb 3.2 oz (133 kg)              We Performed the Following     FLU VACCINE, (RIV4) RECOMBINANT HA  , IM (FluBlok, egg free) [76296]- >18 YRS (G recommended  50-64 YRS)     Vaccine Administration, Initial [02920]        Primary Care Provider Office Phone # Fax #    [facility] [phone] [address]        Equal Access to Services     [facility]: Hadii aad ku hadasho Soomaali, waaxda luqadaha, qaybta kaalmada adeegyada, waxjanie guadarrama haylinda arguello . So [facility] [phone].    ATENCIÓN: Si habla español, tiene a eason disposición servicios gratuitos de asistencia lingüística. [facility] [phone].    We comply with applicable federal civil rights laws and [place] laws. We do not discriminate on the basis of race, color, national origin, age, disability, sex, sexual orientation, or gender identity.            Thank you!     Thank you for choosing [facility]  for your care. Our goal is always to provide you with excellent care. Hearing back from our patients is one way we can continue to improve our services. Please take a few minutes to complete the written survey that you may receive in the mail after your visit with us. Thank you!             Your Updated Medication List - Protect others around you: Learn how to safely use, store and throw away your medicines at www.disposemymeds.org.          This list is accurate as of 9/14/18 12:50 PM.  Always use your most recent med list.                   Brand Name Dispense Instructions for use Diagnosis    AMBIEN 10 MG tablet   Generic drug:  zolpidem      1 TABLET DAILY AT BEDTIME        ASPIRIN NOT PRESCRIBED    INTENTIONAL    0 each    Please choose reason not prescribed, below    Paroxysmal atrial fibrillation (H)       blood glucose monitoring lancets     1 Box    TEST ONCE DAILY OR AS NEEDED *DISPENSE WHATEVER IS  COVERED BY INS*    Type 2 diabetes mellitus without complication, without long-term current use of insulin (H)       blood glucose monitoring meter device kit    no brand specified    1 kit    Use to test blood sugar one times daily or as directed.    Type 2 diabetes mellitus without complication, without long-term current use of insulin (H)       * blood glucose monitoring test strip    no brand specified    100 strip    Use to test blood sugars one times daily or as directed    Type 2 diabetes mellitus without complication, without long-term current use of insulin (H)       * blood glucose monitoring test strip    ONETOUCH ULTRA    50 strip    USE TO TEST BLOOD SUGARS ONE TIMES DAILY OR AS DIRECTED Need to see MD for further refills    Type 2 diabetes mellitus without complication, without long-term current use of insulin (H)       * BUSPAR PO      Take 30 mg by mouth 2 times daily        * BusPIRone HCl 30 MG Tabs      Take by mouth 2 times daily        BYDUREON 2 MG SQ pen for weelky inj   Generic drug:  exenatide ER      Inject 2 mg Subcutaneous every 7 days        calcium carbonate 500 MG tablet   Generic drug:  calcium carbonate 500 mg {elemental}     270 tablet    TAKE 1 TABLET BY MOUTH. THREE TIMES DAILY.    Elevated PTHrP level (H)       [facility]S SLOW RELEASE IRON 143 (45 Fe) MG Tbcr   Generic drug:  Ferrous Sulfate     180 tablet    TAKE 1 TABLET BY MOUTH 2 TIMES DAILY    Iron deficiency       CVS SPECTRAVITE ULTRA WOMEN Tabs     90 tablet    TAKE 1 TABLET BY MOUTH DAILY    Constipation, chronic       CVS vitamin  B12 1000 MCG Tabs   Generic drug:  cyanocobalamin     90 tablet    TAKE 1 TABLET (1,000 MCG) BY MOUTH DAILY    Vitamin B12 deficiency (non anemic)       docusate sodium 100 MG capsule    COLACE    180 capsule    TAKE 1 CAPSULE (100 MG) BY MOUTH 2 TIMES DAILY    Constipation, unspecified constipation type       flecainide 50 MG tablet    TAMBOCOR    90 tablet    Take 1.5 tablets (75 mg) by mouth 2  times daily    Atrial fibrillation (H)       FLUoxetine 20 MG capsule    PROzac     Take 1 capsule by mouth        furosemide 40 MG tablet    LASIX    90 tablet    TAKE 1 TABLET (40 MG) BY MOUTH DAILY    Pedal edema       insulin pen needle 31G X 5 MM    B-D U/F    100 each    Use once daily or as directed.    Type 2 diabetes mellitus without complication (H)       * levothyroxine 75 MCG tablet    SYNTHROID/LEVOTHROID    60 tablet    TAKE 1 TABLET (75 MG) BY MOUTH EVERY DAY    Acquired hypothyroidism, Vitamin D deficiency, Hyperparathyroidism (H), Abdominal pain, epigastric       * levothyroxine 75 MCG tablet    SYNTHROID/LEVOTHROID    90 tablet    TAKE 1 TABLET (75 MCG) BY MOUTH DAILY    Acquired hypothyroidism, Vitamin D deficiency, Hyperparathyroidism (H), Abdominal pain, epigastric       lisinopril 10 MG tablet    PRINIVIL/ZESTRIL    90 tablet    TAKE 1 TABLET (10 MG) BY MOUTH DAILY    Type 2 diabetes mellitus without complication, without long-term current use of insulin (H), Essential hypertension with goal blood pressure less than 140/90       LORazepam 1 MG tablet    ATIVAN     Take 1 mg by mouth At Bedtime        metoprolol succinate 100 MG 24 hr tablet    TOPROL-XL    90 tablet    Take 1 tablet (100 mg) by mouth daily    Paroxysmal atrial fibrillation (H), Chest pain, unspecified type       omeprazole 20 MG CR capsule    priLOSEC    180 capsule    Take 1 capsule (20 mg) by mouth 2 times daily    Hiatal hernia       order for DME     1 Box    Test strips for pt's glucometer, brand as covered by insurance. Test twice daily or PRN.    Type 2 diabetes mellitus without complication (H)       * potassium chloride SA 20 MEQ CR tablet    KLOR-CON    180 tablet    TAKE 1 TABLET (20 MEQ) BY MOUTH 2 TIMES DAILY    Diuresis       * KLOR-CON 20 MEQ CR tablet   Generic drug:  potassium chloride SA     180 tablet    TAKE 1 TABLET (20 MEQ) BY MOUTH 2 TIMES DAILY    Diuresis       PRADAXA ANTICOAGULANT 150 MG capsule    Generic drug:  dabigatran ANTICOAGULANT     60 capsule    TAKE ONE CAPSULE BY MOUTH TWICE A DAY STORE IN ORIGINAL  BOTTLE  DAYS OF OPENING    Paroxysmal atrial fibrillation (H)       ranitidine 150 MG tablet    ZANTAC    60 tablet    TAKE 1 TABLET (150 MG) BY MOUTH 2 TIMES DAILY    Gastroesophageal reflux disease, esophagitis presence not specified       simvastatin 20 MG tablet    ZOCOR    90 tablet    TAKE 1 TABLET BY MOUTH AT BEDTIME.    Hyperlipidemia LDL goal <100       * vitamin D 39512 UNIT capsule    ERGOCALCIFEROL    8 capsule    TAKE 1 CAPSULE BY MOUTH ONCE WEEKLY.    Vitamin D deficiency, Hyperparathyroidism (H), Acquired hypothyroidism, Abdominal pain, epigastric       * vitamin D 01196 UNIT capsule    ERGOCALCIFEROL    8 capsule    TAKE 1 CAPSULE BY MOUTH ONCE WEEKLY.    Vitamin D deficiency, Hyperparathyroidism (H), Acquired hypothyroidism, Abdominal pain, epigastric       Vitamin D3 3000 units Tabs           * Notice:  This list has 10 medication(s) that are the same as other medications prescribed for you. Read the directions carefully, and ask your doctor or other care provider to review them with you.

## 2018-10-31 DIAGNOSIS — K59.09 CONSTIPATION, CHRONIC: ICD-10-CM

## 2018-10-31 DIAGNOSIS — K21.9 GASTROESOPHAGEAL REFLUX DISEASE, ESOPHAGITIS PRESENCE NOT SPECIFIED: ICD-10-CM

## 2018-10-31 DIAGNOSIS — E61.1 IRON DEFICIENCY: ICD-10-CM

## 2018-10-31 RX ORDER — MULTIVITAMIN/IRON/FOLIC ACID 18MG-0.4MG
TABLET ORAL
Qty: 90 TABLET | Refills: 3 | Status: SHIPPED | OUTPATIENT
Start: 2018-10-31 | End: 2020-01-02

## 2018-10-31 NOTE — TELEPHONE ENCOUNTER
Requested Prescriptions   Pending Prescriptions Disp Refills     ranitidine (ZANTAC) 150 MG tablet 60 tablet 5     Sig: TAKE 1 TABLET (150 MG) BY MOUTH 2 TIMES DAILY    There is no refill protocol information for this order        Last Written Prescription Date:  12/18/2017  Last Fill Quantity: 60,  # refills: 5   Last office visit: 9/14/2018 with prescribing provider:  Oneil Mercedes Office Visit:

## 2018-11-01 DIAGNOSIS — K21.9 GASTROESOPHAGEAL REFLUX DISEASE, ESOPHAGITIS PRESENCE NOT SPECIFIED: ICD-10-CM

## 2018-11-05 NOTE — TELEPHONE ENCOUNTER
Please contact pharmacy to confirm if duplicate. Script was sent on 10/31/18.    Fariba Pimentel RN  [facility]

## 2018-11-05 NOTE — TELEPHONE ENCOUNTER
Pharmacy closed. Please call after 9 to confirm duplicate.    ranitidine (ZANTAC) 150 MG tablet 60 tablet 5 10/31/2018  No      Sig: TAKE 1 TABLET (150 MG) BY MOUTH 2 TIMES DAILY     Class: E-Prescribe     Order: [id number]     E-Prescribing Status: Receipt confirmed by pharmacy (10/31/2018  2:55 PM CDT)       Fariba THOMPSON CMA ([facility])

## 2018-11-05 NOTE — TELEPHONE ENCOUNTER
Spoke to pharmacy and informed them patient has active rx with [facility].  Fariba THOMPSON CMA ([facility])

## 2018-11-26 DIAGNOSIS — E61.1 IRON DEFICIENCY: ICD-10-CM

## 2018-11-26 DIAGNOSIS — E53.8 VITAMIN B12 DEFICIENCY (NON ANEMIC): ICD-10-CM

## 2018-11-28 ENCOUNTER — TELEPHONE (OUTPATIENT)
Dept: INTERNAL MEDICINE | Facility: CLINIC | Age: 58
End: 2018-11-28

## 2018-11-28 NOTE — TELEPHONE ENCOUNTER
Panel Management Review      Patient has the following on her problem list:     Depression / Dysthymia review    Measure:  Needs PHQ-9 score of 4 or less during index window.  Administer PHQ-9 and if score is 5 or more, send encounter to provider for next steps.    5 - 7 month window range: Due    PHQ-9 SCORE 9/1/2017 12/8/2017 12/18/2017   PHQ-9 Total Score - - -   PHQ-9 Total Score 3 7 4       If PHQ-9 recheck is 5 or more, route to provider for next steps.    Patient is due for:  PHQ9    Diabetes    ASA: Failed    Last A1C  Lab Results   Component Value Date    A1C 6.0 12/06/2016    A1C 5.8 08/09/2016    A1C 6.4 04/07/2016    A1C 5.9 06/15/2015    A1C 5.9 02/27/2015     A1C tested: FAILED    Last LDL:    Lab Results   Component Value Date    CHOL 153 07/07/2016     Lab Results   Component Value Date    HDL 39 07/07/2016     Lab Results   Component Value Date    LDL 85 09/01/2017    LDL 77 07/07/2016     Lab Results   Component Value Date    TRIG 185 07/07/2016     Lab Results   Component Value Date    CHOLHDLRATIO 3.2 02/27/2015     Lab Results   Component Value Date    NHDL 114 07/07/2016       Is the patient on a Statin? YES             Is the patient on Aspirin? NO    Medications     HMG CoA Reductase Inhibitors    simvastatin (ZOCOR) 20 MG tablet          Last three blood pressure readings:  BP Readings from Last 3 Encounters:   04/03/18 138/80   03/28/18 111/73   12/26/17 120/80       Date of last diabetes office visit: 09/20/17     Tobacco History:     History   Smoking Status     Former Smoker     Packs/day: 1.50     Years: 13.00     Quit date: 1/1/1994   Smokeless Tobacco     Never Used           Composite cancer screening  Chart review shows that this patient is due/due soon for the following None  Summary:    Patient is due/failing the following:   Diabetic check     Action needed:   Patient needs office visit for diabetic check.    Type of outreach:    Sent letter.    Questions for provider review:     None                                                                                                                                    Stefany Rich MA       Chart routed to none .

## 2018-11-28 NOTE — TELEPHONE ENCOUNTER
I have not seen patient in over a year and it looks like she is seen outside our system now.  Please ask pharmacy to send to new PCP.    Vania Monroe, CNP

## 2018-11-28 NOTE — TELEPHONE ENCOUNTER
"Routing refill request to provider for review/approval because:  Roxy given x1 and patient did not follow up, please advise  Labs not current:        Requested Prescriptions   Pending Prescriptions Disp Refills     CVS SLOW RELEASE IRON 143 (45 Fe) MG TBCR [Pharmacy Med Name: CVS SLOW RELEASE IRON TABLET]  Last Written Prescription Date:  10/31/18  Last Fill Quantity: 60,  # refills: 0   Last office visit: 12/26/2017 with prescribing provider:     Future Office Visit:     60 tablet 0     Sig: PLEASE SEE ATTACHED FOR DETAILED DIRECTIONS    Iron Supplements Failed    11/26/2018  5:33 PM       Failed - Hgb OR Hct on record within the past 12 mos.    Patient need only have had a HGB or HCT on file in the past 12 mos. That result does not need to be normal.    Recent Labs   Lab Test  08/18/17   1010  10/28/16   0955  11/18/15   1022   HGB  14.8  14.5  14.3     Recent Labs   Lab Test  08/18/17   1010  10/28/16   0955  11/18/15   1022   HCT  43.9  44.8  42.1       Please verify a HGB or HCT has been checked SINCE THE LAST DOSE CHANGE.           Passed - Patient is 12 years of age or older       Passed - Recent (12 mo) or future (30 days) visit within the authorizing provider's specialty    Patient had office visit in the last 12 months or has a visit in the next 30 days with authorizing provider or within the authorizing provider's specialty.  See \"Patient Info\" tab in inbasket, or \"Choose Columns\" in Meds & Orders section of the refill encounter.              CVS VITAMIN  B12 1000 MCG tablet [Pharmacy Med Name: [facility]S B-12 1,000 MCG TABLET]  Last Written Prescription Date:  9/5/18  Last Fill Quantity: 90,  # refills: 0   Last office visit: 12/26/2017 with prescribing provider:     Future Office Visit:     90 tablet 0     Sig: TAKE 1 TABLET (1,000 MCG) BY MOUTH DAILY    Vitamin Supplements (Adult) Protocol Failed    11/26/2018  5:33 PM       Failed - Normal Hgb on file in past 12 mos    Recent Labs   Lab Test  08/18/17   " "1010   HGB  14.8              Passed - High dose Vitamin D not ordered       Passed - Recent (12 mo) or future (30 days) visit within the authorizing provider's specialty    Patient had office visit in the last 12 months or has a visit in the next 30 days with authorizing provider or within the authorizing provider's specialty.  See \"Patient Info\" tab in inbasket, or \"Choose Columns\" in Meds & Orders section of the refill encounter.              Sondra Cohen RN - BC      "

## 2018-11-28 NOTE — LETTER
November 28, 2018          Gladys Singh,  [address] [place] [id number]        Dear Gladys Singh      Monitoring and managing your preventative and chronic health conditions are very important to us. Our records indicate that you have not scheduled for Appointment with your provider  which was recommended by Dr. Solis      If you have received your health care elsewhere, please call the clinic so the information can be documented in your chart.    Please call [phone] or message us through your Flashpoint account to schedule an appointment or provide information for your chart.     Feel free to contact us if you have any questions or concerns!    I look forward to seeing you and working with you on your health care needs.     Sincerely,       Your [facility] Team/sg

## 2018-11-29 RX ORDER — OMEGA-3/DHA/EPA/FISH OIL 35-113.5MG
TABLET,CHEWABLE ORAL
Qty: 90 TABLET | Refills: 0
Start: 2018-11-29

## 2018-11-29 NOTE — TELEPHONE ENCOUNTER
Spoke to pharmacy and informed them of message.  Fariba THOMPSON CMA ([facility])

## 2018-12-02 DIAGNOSIS — I48.91 A-FIB (H): Primary | ICD-10-CM

## 2018-12-11 DIAGNOSIS — E61.1 IRON DEFICIENCY: ICD-10-CM

## 2018-12-11 DIAGNOSIS — E53.8 VITAMIN B12 DEFICIENCY (NON ANEMIC): ICD-10-CM

## 2018-12-14 NOTE — TELEPHONE ENCOUNTER
Called patient and left VM to call clinic. Okay to speak to anyone on pink team.  Fariba THOMPSON CMA ([facility])

## 2018-12-14 NOTE — TELEPHONE ENCOUNTER
Pt needs appt-pt may have changed clinics. Please clarify and inform pharmacy.  Living kidney/pancreas donor

## 2018-12-17 RX ORDER — OMEGA-3/DHA/EPA/FISH OIL 35-113.5MG
TABLET,CHEWABLE ORAL
Qty: 90 TABLET | Refills: 0
Start: 2018-12-17

## 2018-12-17 NOTE — TELEPHONE ENCOUNTER
Spoke to patient and she has switched to [facility]. She stated she has spoken with the pharmacy and they have it figured out. Please disregard.  Fariba THOMPSON CMA ([facility])

## 2019-01-07 DIAGNOSIS — K44.9 HIATAL HERNIA: ICD-10-CM

## 2019-01-07 NOTE — TELEPHONE ENCOUNTER
Requested Prescriptions   Pending Prescriptions Disp Refills     omeprazole (PRILOSEC) 20 MG DR capsule 180 capsule 3     Sig: Take 1 capsule (20 mg) by mouth 2 times daily    There is no refill protocol information for this order        Last Written Prescription Date:  12/18/17  Last Fill Quantity: 180,  # refills: 3   Last office visit: 9/14/2018 with prescribing provider:  9/14/18   Future Office Visit:

## 2019-02-13 ENCOUNTER — DOCUMENTATION ONLY (OUTPATIENT)
Dept: OPHTHALMOLOGY | Facility: CLINIC | Age: 59
End: 2019-02-13

## 2019-03-21 ENCOUNTER — TELEPHONE (OUTPATIENT)
Dept: FAMILY MEDICINE | Facility: CLINIC | Age: 59
End: 2019-03-21

## 2019-03-21 NOTE — LETTER
March 21, 2019          Gladys Singh,  [address] [place] [id number]        Dear Gladys Singh      Monitoring and managing your preventative and chronic health conditions are very important to us. Our records indicate that you have not scheduled for Appointment with your provider for blood pressure and diabetes which was recommended by Vania Monroe CNP.        If you have received your health care elsewhere, please call the clinic so the information can be documented in your chart.    Please call [phone] or message us through your [employer]Desk account to schedule an appointment or provide information for your chart.     Feel free to contact us if you have any questions or concerns!    I look forward to seeing you and working with you on your health care needs.     Sincerely,       Your [facility] Team/carmen

## 2019-03-21 NOTE — TELEPHONE ENCOUNTER
Panel Management Review      Patient has the following on her problem list:     Diabetes    ASA: Passed    Last A1C  Lab Results   Component Value Date    A1C 6.0 12/06/2016    A1C 5.8 08/09/2016    A1C 6.4 04/07/2016    A1C 5.9 06/15/2015    A1C 5.9 02/27/2015     A1C tested: FAILED    Last LDL:    Lab Results   Component Value Date    CHOL 153 07/07/2016     Lab Results   Component Value Date    HDL 39 07/07/2016     Lab Results   Component Value Date    LDL 85 09/01/2017    LDL 77 07/07/2016     Lab Results   Component Value Date    TRIG 185 07/07/2016     Lab Results   Component Value Date    CHOLHDLRATIO 3.2 02/27/2015     Lab Results   Component Value Date    NHDL 114 07/07/2016       Is the patient on a Statin? YES  Is the patient on Aspirin? YES    Medications     HMG CoA Reductase Inhibitors     simvastatin (ZOCOR) 20 MG tablet             Last three blood pressure readings:  BP Readings from Last 3 Encounters:   04/03/18 138/80   03/28/18 111/73   12/26/17 120/80       Date of last diabetes office visit: 12/6/16     Tobacco History:     History   Smoking Status     Former Smoker     Packs/day: 1.50     Years: 13.00     Quit date: 1/1/1994   Smokeless Tobacco     Never Used         Hypertension   Last three blood pressure readings:  BP Readings from Last 3 Encounters:   04/03/18 138/80   03/28/18 111/73   12/26/17 120/80     Blood pressure: Passed    HTN Guidelines:  Age 18-59 BP range:  Less than 140/90  Age 60-85 with Diabetes:  Less than 140/90  Age 60-85 without Diabetes:  less than 150/90      Composite cancer screening  Chart review shows that this patient is due/due soon for the following None  Summary:    Patient is due/failing the following:   A1C and BP CHECK    Action needed:   Patient needs office visit for Diabetes and Hypertension.    Type of outreach:    Sent letter.    Questions for provider review:    None                                                                                                                                     Carlota SADLER [facility]       Chart routed to none .

## 2019-05-15 ENCOUNTER — TRANSFERRED RECORDS (OUTPATIENT)
Dept: HEALTH INFORMATION MANAGEMENT | Facility: CLINIC | Age: 59
End: 2019-05-15

## 2019-12-29 DIAGNOSIS — K59.09 CONSTIPATION, CHRONIC: ICD-10-CM

## 2020-01-02 RX ORDER — MULTIVITAMIN/IRON/FOLIC ACID 18MG-0.4MG
TABLET ORAL
Qty: 30 TABLET | Refills: 0 | Status: SHIPPED | OUTPATIENT
Start: 2020-01-02

## 2020-01-30 DIAGNOSIS — K59.09 CONSTIPATION, CHRONIC: ICD-10-CM

## 2020-02-03 NOTE — TELEPHONE ENCOUNTER
Attempt 1, called patient and left VM to call clinic. Please clarify if patient still goes to [facility] [place].  Fariba THOMPSON CMA ([facility])

## 2020-02-07 RX ORDER — MULTIVITAMIN/IRON/FOLIC ACID 18MG-0.4MG
TABLET ORAL
Qty: 30 TABLET | Refills: 0 | OUTPATIENT
Start: 2020-02-07

## 2020-02-08 ENCOUNTER — HEALTH MAINTENANCE LETTER (OUTPATIENT)
Age: 60
End: 2020-02-08

## 2020-06-08 ENCOUNTER — TRANSFERRED RECORDS (OUTPATIENT)
Dept: HEALTH INFORMATION MANAGEMENT | Facility: CLINIC | Age: 60
End: 2020-06-08

## 2020-11-06 ENCOUNTER — TRANSFERRED RECORDS (OUTPATIENT)
Dept: HEALTH INFORMATION MANAGEMENT | Facility: CLINIC | Age: 60
End: 2020-11-06

## 2020-11-08 ENCOUNTER — HEALTH MAINTENANCE LETTER (OUTPATIENT)
Age: 60
End: 2020-11-08

## 2021-03-22 ENCOUNTER — IMMUNIZATION (OUTPATIENT)
Dept: NURSING | Facility: CLINIC | Age: 61
End: 2021-03-22
Payer: COMMERCIAL

## 2021-03-22 PROCEDURE — 0001A PR COVID VAC PFIZER DIL RECON 30 MCG/0.3 ML IM: CPT

## 2021-03-22 PROCEDURE — 91300 PR COVID VAC PFIZER DIL RECON 30 MCG/0.3 ML IM: CPT

## 2021-03-28 ENCOUNTER — HEALTH MAINTENANCE LETTER (OUTPATIENT)
Age: 61
End: 2021-03-28

## 2021-04-12 ENCOUNTER — IMMUNIZATION (OUTPATIENT)
Dept: NURSING | Facility: CLINIC | Age: 61
End: 2021-04-12
Attending: FAMILY MEDICINE
Payer: COMMERCIAL

## 2021-04-12 PROCEDURE — 0002A PR COVID VAC PFIZER DIL RECON 30 MCG/0.3 ML IM: CPT

## 2021-04-12 PROCEDURE — 91300 PR COVID VAC PFIZER DIL RECON 30 MCG/0.3 ML IM: CPT

## 2021-05-04 NOTE — PROGRESS NOTES
HISTORY OF PRESENT ILLNESS:    Gladys Singh is a 61 year old female who is seen for evaluation of low back pain and bilateral hip pain, x 6 weeks or so  Present symptoms: she notes pain in posterior calves and achilles areas in last month, on/off.  Worse the more she walks.  Laying down improves it.  Worse in afternoon/evening.  Occasional numbness/tingling feet, (but has DM)  No cramping with walking.   Treatments tried to this point: none    Other PMH:   Past Medical History:   Diagnosis Date     Anxiety     sees Dr. Chance     Arthritis involving multiple sites     knees, hip     ASCUS (atypical squamous cells of undetermined significance) on Pap smear 3/13/14     Benign neoplasm of stomach      BMI 40.0-44.9, adult (H)     sees Dr. Quan     Cervical high risk human papillomavirus (HPV) DNA test positive 2010     Chronic atrial fibrillation (H) 3/28/2018    S/P ablation.     Colon polyp 2005     Constipation, chronic      Crohn's disease (H) 2012     Depression     sees Dr. Elio Chance     Diverticulosis 11/1/2010     Fundic gland polyps of stomach, benign 11/1/2010     H/O colposcopy with cervical biopsy 9/13/13    no lesions seen.  repeat pap due in 6 months     Hiatal hernia 11/1/2010     High risk HPV infection 8/21/13    NIL pap/+ HR HPV (16)     Hyperlipidemia LDL goal <100      Hyperparathyroidism, unspecified     sees Dr Morris, Endocrine every 6 mos     Hypothyroidism     sees Dr. Morris -endo      Ileitis 9/19/12    ? crohn's     Iliotibial band friction syndrome of both knees      Ingrown toenail      Iron deficiency     on iron bid. had egd and colon per Dr. Sheppard.     KHUSHI (obstructive sleep apnea)     CPAP     Painful total knee replacement (H)     TCO B TKA 2004 [facility]     Papanicolaou smear of cervix with atypical squamous cells of undetermined significance (ASC-US) 2011     Pedal edema     on lasix and potasium     Plantar fasciitis     resolved     Type II or unspecified  type diabetes mellitus without mention of complication, not stated as uncontrolled     resolved     Vitamin D deficiency     sees Dr Morris, Endocrine every 6 mos     Surgical Hx:   Past Surgical History:   Procedure Laterality Date     APPENDECTOMY       ARTHROSCOPY KNEE RT/LT       C  DELIVERY ONLY      x 2     C TOTAL KNEE ARTHROPLASTY  2004    bilateral     COLONOSCOPY  , , , 2017     H ABLATION FOCAL AFIB  2018     HC COLP CERVIX/UPPER VAGINA W BX CERVIX  ,     neg     HC ESOPHAGOSCOPY, DIAGNOSTIC  , ,     benign, fungal infection     HC REPAIR OF NASAL SEPTUM       HERNIA REPAIR, UMBILICAL       PE TUBES       TONSILLECTOMY & ADENOIDECTOMY       total knee Bilateral 2004    RBAD Mikael Prasad MD      Family Hx: See EMR  Social Hx: See EMR    REVIEW OF SYSTEMS: \  CONSTITUTIONAL:  NEGATIVE for fever, chills, change in weight  INTEGUMENTARY/SKIN:  NEGATIVE for worrisome rashes, moles or lesions  EYES:  NEGATIVE for vision changes or irritation  ENT/MOUTH:  NEGATIVE for ear, mouth and throat problems  RESP:  NEGATIVE for significant cough or SOB  BREAST:  NEGATIVE for masses, tenderness or discharge  CV:  NEGATIVE for chest pain, palpitations or peripheral edema  GI:  NEGATIVE for nausea, abdominal pain, heartburn, or change in bowel habits  :  Negative   MUSCULOSKELETAL:  See HPI above  NEURO:  NEGATIVE for weakness, dizziness or paresthesias  ENDOCRINE:  NEGATIVE for temperature intolerance, skin/hair changes  HEME/ALLERGY/IMMUNE:  NEGATIVE for bleeding problems  PSYCHIATRIC:  NEGATIVE for changes in mood or affect      PHYSICAL EXAM:  /80 (BP Location: Left arm, Patient Position: Sitting, Cuff Size: Adult Regular)   Pulse 70   LMP 2010   SpO2 95%     GENERAL APPEARANCE: healthy, alert, no distress and over weight   SKIN: no suspicious lesions or rashes  NEURO: Normal strength and tone, mentation intact and speech normal  VASCULAR:  good pulses, and capillary  refill   LYMPH: no lymphadenopathy   PSYCH:  mentation appears normal and affect normal/bright    MSK:  Bilateral Hip Exam:  Lumbar range of motion:    Flexion: to touch toes   Extension: good, but some low back pain, no hip pain   Side bending:  some low back pain, no hip pain  Toe stand: Able  Heel stand: Able    Seated SLR: negative  Supine SLR: negative     Tender: left greater trochanter, right greater trochanter, lumbar paraspinals, and close to SI joints  Hip range of motion: full without pain  Strength: full strength, negative trendelenburg  Leg lengths: not tested    Sensation:normal  Motor: all normal    Imaging: none today     Impression: I reassured her that based on her pain-free range of motion of the hips that she very likely does not have significant hip arthritis.  I do think that she has bilateral greater trochanteric bursitis.  But the pain also could be referred from her low back.  I cannot quite tell if the pain could be coming from her SI joints.  I suspect that the Achilles area pains that she had could have been referred from her low back.  I see no evidence of Achilles thickening or swelling in the area.    Plan: I offered possible steroid injections for her hips.  She was a bit concerned about elevating her blood sugars.  I suggested physical therapy for her low back and they could do therapy also for her hips.  She will have the option of coming back for steroid injections down the road if she wants to.  If she has residual symptoms, when she returns we would get the appropriate x-rays, specifically expecting to look at her SI joints if they still hurt.    Return to clinic as needed     KATHRINE Wong MD  Dept. Orthopedic Surgery  [facility]

## 2021-05-05 ENCOUNTER — OFFICE VISIT (OUTPATIENT)
Dept: ORTHOPEDICS | Facility: CLINIC | Age: 61
End: 2021-05-05
Payer: COMMERCIAL

## 2021-05-05 VITALS — SYSTOLIC BLOOD PRESSURE: 139 MMHG | HEART RATE: 70 BPM | OXYGEN SATURATION: 95 % | DIASTOLIC BLOOD PRESSURE: 80 MMHG

## 2021-05-05 DIAGNOSIS — M54.50 CHRONIC MIDLINE LOW BACK PAIN WITHOUT SCIATICA: Primary | ICD-10-CM

## 2021-05-05 DIAGNOSIS — G89.29 CHRONIC MIDLINE LOW BACK PAIN WITHOUT SCIATICA: Primary | ICD-10-CM

## 2021-05-05 DIAGNOSIS — M70.62 GREATER TROCHANTERIC BURSITIS OF BOTH HIPS: ICD-10-CM

## 2021-05-05 DIAGNOSIS — M70.61 GREATER TROCHANTERIC BURSITIS OF BOTH HIPS: ICD-10-CM

## 2021-05-05 PROCEDURE — 99204 OFFICE O/P NEW MOD 45 MIN: CPT | Performed by: ORTHOPAEDIC SURGERY

## 2021-05-05 ASSESSMENT — PAIN SCALES - GENERAL: PAINLEVEL: MODERATE PAIN (5)

## 2021-05-05 NOTE — LETTER
5/5/2021         RE: Gladys Singh  [address]  [place] [address]        Dear Colleague,    Thank you for referring your patient, Gladys Singh, to the [facility]. Please see a copy of my visit note below.    HISTORY OF PRESENT ILLNESS:    Gladys Singh is a 61 year old female who is seen for evaluation of low back pain and bilateral hip pain, x 6 weeks or so  Present symptoms: she notes pain in posterior calves and achilles areas in last month, on/off.  Worse the more she walks.  Laying down improves it.  Worse in afternoon/evening.  Occasional numbness/tingling feet, (but has DM)  No cramping with walking.   Treatments tried to this point: none    Other PMH:   Past Medical History:   Diagnosis Date     Anxiety     sees Dr. Chance     Arthritis involving multiple sites     knees, hip     ASCUS (atypical squamous cells of undetermined significance) on Pap smear 3/13/14     Benign neoplasm of stomach      BMI 40.0-44.9, adult (H)     sees Dr. Quan     Cervical high risk human papillomavirus (HPV) DNA test positive 2010     Chronic atrial fibrillation (H) 3/28/2018    S/P ablation.     Colon polyp 2005     Constipation, chronic      Crohn's disease (H) 2012     Depression     sees Dr. Elio Chance     Diverticulosis 11/1/2010     Fundic gland polyps of stomach, benign 11/1/2010     H/O colposcopy with cervical biopsy 9/13/13    no lesions seen.  repeat pap due in 6 months     Hiatal hernia 11/1/2010     High risk HPV infection 8/21/13    NIL pap/+ HR HPV (16)     Hyperlipidemia LDL goal <100      Hyperparathyroidism, unspecified     sees Dr Morris, Endocrine every 6 mos     Hypothyroidism     sees Dr. Morris -endo      Ileitis 9/19/12    ? crohn's     Iliotibial band friction syndrome of both knees      Ingrown toenail      Iron deficiency     on iron bid. had egd and colon per Dr. Sheppard.     KHUSHI (obstructive sleep apnea)     CPAP     Painful total  knee replacement (H)     TCO B TKA  [facility]     Papanicolaou smear of cervix with atypical squamous cells of undetermined significance (ASC-US)      Pedal edema     on lasix and potasium     Plantar fasciitis     resolved     Type II or unspecified type diabetes mellitus without mention of complication, not stated as uncontrolled     resolved     Vitamin D deficiency     sees Dr Morris, Endocrine every 6 mos     Surgical Hx:   Past Surgical History:   Procedure Laterality Date     APPENDECTOMY       ARTHROSCOPY KNEE RT/LT       C  DELIVERY ONLY      x 2     C TOTAL KNEE ARTHROPLASTY  2004    bilateral     COLONOSCOPY  , , ,      H ABLATION FOCAL AFIB  2018     HC COLP CERVIX/UPPER VAGINA W BX CERVIX  ,     neg     HC ESOPHAGOSCOPY, DIAGNOSTIC  , ,     benign, fungal infection     HC REPAIR OF NASAL SEPTUM       HERNIA REPAIR, UMBILICAL       PE TUBES       TONSILLECTOMY & ADENOIDECTOMY       total knee Bilateral 2004    BRAD Mikael Prasad MD      Family Hx: See EMR  Social Hx: See EMR    REVIEW OF SYSTEMS: \  CONSTITUTIONAL:  NEGATIVE for fever, chills, change in weight  INTEGUMENTARY/SKIN:  NEGATIVE for worrisome rashes, moles or lesions  EYES:  NEGATIVE for vision changes or irritation  ENT/MOUTH:  NEGATIVE for ear, mouth and throat problems  RESP:  NEGATIVE for significant cough or SOB  BREAST:  NEGATIVE for masses, tenderness or discharge  CV:  NEGATIVE for chest pain, palpitations or peripheral edema  GI:  NEGATIVE for nausea, abdominal pain, heartburn, or change in bowel habits  :  Negative   MUSCULOSKELETAL:  See HPI above  NEURO:  NEGATIVE for weakness, dizziness or paresthesias  ENDOCRINE:  NEGATIVE for temperature intolerance, skin/hair changes  HEME/ALLERGY/IMMUNE:  NEGATIVE for bleeding problems  PSYCHIATRIC:  NEGATIVE for changes in mood or affect      PHYSICAL EXAM:  /80 (BP Location: Left arm, Patient Position: Sitting, Cuff Size:  Adult Regular)   Pulse 70   LMP 02/01/2010   SpO2 95%     GENERAL APPEARANCE: healthy, alert, no distress and over weight   SKIN: no suspicious lesions or rashes  NEURO: Normal strength and tone, mentation intact and speech normal  VASCULAR:  good pulses, and capillary refill   LYMPH: no lymphadenopathy   PSYCH:  mentation appears normal and affect normal/bright    MSK:  Bilateral Hip Exam:  Lumbar range of motion:    Flexion: to touch toes   Extension: good, but some low back pain, no hip pain   Side bending:  some low back pain, no hip pain  Toe stand: Able  Heel stand: Able    Seated SLR: negative  Supine SLR: negative     Tender: left greater trochanter, right greater trochanter, lumbar paraspinals, and close to SI joints  Hip range of motion: full without pain  Strength: full strength, negative trendelenburg  Leg lengths: not tested    Sensation:normal  Motor: all normal    Imaging: none today     Impression: I reassured her that based on her pain-free range of motion of the hips that she very likely does not have significant hip arthritis.  I do think that she has bilateral greater trochanteric bursitis.  But the pain also could be referred from her low back.  I cannot quite tell if the pain could be coming from her SI joints.  I suspect that the Achilles area pains that she had could have been referred from her low back.  I see no evidence of Achilles thickening or swelling in the area.    Plan: I offered possible steroid injections for her hips.  She was a bit concerned about elevating her blood sugars.  I suggested physical therapy for her low back and they could do therapy also for her hips.  She will have the option of coming back for steroid injections down the road if she wants to.  If she has residual symptoms, when she returns we would get the appropriate x-rays, specifically expecting to look at her SI joints if they still hurt.    Return to clinic as needed     KATHRINE Wong MD  Dept.  Orthopedic Surgery  [facility]         Again, thank you for allowing me to participate in the care of your patient.        Sincerely,        Dante Wong MD

## 2021-05-22 ENCOUNTER — HEALTH MAINTENANCE LETTER (OUTPATIENT)
Age: 61
End: 2021-05-22

## 2021-06-14 ENCOUNTER — TRANSFERRED RECORDS (OUTPATIENT)
Dept: HEALTH INFORMATION MANAGEMENT | Facility: CLINIC | Age: 61
End: 2021-06-14

## 2021-09-11 ENCOUNTER — HEALTH MAINTENANCE LETTER (OUTPATIENT)
Age: 61
End: 2021-09-11

## 2021-10-28 ENCOUNTER — IMMUNIZATION (OUTPATIENT)
Dept: NURSING | Facility: CLINIC | Age: 61
End: 2021-10-28
Payer: COMMERCIAL

## 2021-10-28 PROCEDURE — 0003A PR COVID VAC PFIZER DIL RECON 30 MCG/0.3 ML IM: CPT

## 2021-10-28 PROCEDURE — 91300 PR COVID VAC PFIZER DIL RECON 30 MCG/0.3 ML IM: CPT

## 2021-11-12 ENCOUNTER — TRANSFERRED RECORDS (OUTPATIENT)
Dept: HEALTH INFORMATION MANAGEMENT | Facility: CLINIC | Age: 61
End: 2021-11-12
Payer: COMMERCIAL

## 2022-01-01 ENCOUNTER — HEALTH MAINTENANCE LETTER (OUTPATIENT)
Age: 62
End: 2022-01-01

## 2022-03-31 ENCOUNTER — IMMUNIZATION (OUTPATIENT)
Dept: NURSING | Facility: CLINIC | Age: 62
End: 2022-03-31
Payer: COMMERCIAL

## 2022-03-31 PROCEDURE — 0054A COVID-19,PF,PFIZER (12+ YRS): CPT

## 2022-03-31 PROCEDURE — 91305 COVID-19,PF,PFIZER (12+ YRS): CPT

## 2022-04-23 ENCOUNTER — HEALTH MAINTENANCE LETTER (OUTPATIENT)
Age: 62
End: 2022-04-23

## 2022-10-02 NOTE — TELEPHONE ENCOUNTER
0848:  Pt has exp rhonchi heard over Rt Post Upper lobe. Pt has a non-productive cough & states, \"I feel kind of congested'. Pt voids 150-200ml at a time. Pt also wanted the heat turned up to 72 in the room - done.    Patient educated on getting some exercise - encouraged to ambulate in the hallway w/ his cane & a mask. He stated he will do this.    Pt is eating well & watching TV in his chair. Pt declined the colace - he stated he had a \"good\" BM yesterday afternoon.    0850: Dr. To in to assess & she said she will order HD for tomorrow.    0934: Perfect Served Dr. Arthur to DC the Discharge order (as pt has no Chair time at his HD centre at [place].)   Patient would need to be seen for antibiotic refill.    Vania Monroe, CNP

## 2022-10-30 ENCOUNTER — HEALTH MAINTENANCE LETTER (OUTPATIENT)
Age: 62
End: 2022-10-30

## 2022-11-16 NOTE — TELEPHONE ENCOUNTER
Disp Refills Start End DARYL   Multiple Vitamins-Minerals ([facility] S[facility]RAV[facility] ADVANCED) TABS 30 tablet 0 1/2/2020  No   Sig: TAKE 1 TABLET BY MOUTH DAILY   Sent to pharmacy as: Multiple Vitamins-Minerals ([facility] S[facility]RA[facility]) TABS   Class: E-Prescribe   Notes to Pharmacy: Needs appointment prior to further refills. Please call [phone] to schedule   Order: [id number]   E-Prescribing Status: Receipt confirmed by pharmacy (1/2/2020  8:23 AM CST)     Rosalinda Flores MA on 1/31/2020 at 9:44 AM    NP Left Shoulder Pain/Imaging in Epic. Please advise if additional imaging in needed.   Future Appointments   Date Time Provider Анна Hall   11/17/2022 12:40 PM CHRISTINA Esparza Doing [id number]

## 2023-03-02 NOTE — MR AVS SNAPSHOT
After Visit Summary   10/31/2017    Gladys Singh    MRN: [id number]           Patient Information     Date Of Birth          1960        Visit Information        Provider Department      10/31/2017 1:40 PM Vania Monroe APRN [facility]        Today's Diagnoses     Recurrent acute suppurative otitis media without spontaneous rupture of left tympanic membrane    -  1    Paroxysmal atrial fibrillation (H)        Type 2 diabetes mellitus without complication, without long-term current use of insulin (H)          Care Instructions    [facility]    If you have any questions regarding to your visit please contact your care team:     Team Pink:   Clinic Hours Telephone Number   Internal Medicine:  Dr. Octavia Monroe, NP       7am-7pm  Monday - Thursday   7am-5pm  Fridays  [phone]  (Appointment scheduling available 24/7)    Questions about your visit?  Team Line  [phone]   Urgent Care - [place] - 11am-9pm Monday-Friday Saturday-Sunday- 9am-5pm   [place] - 5pm-9pm Monday-Friday Saturday-Sunday- 9am-5pm  [phone] - [place]   [phone] - [place]       What options do I have for visits at the clinic other than the traditional office visit?  To expand how we care for you, many of our providers are utilizing electronic visits (e-visits) and telephone visits, when medically appropriate, for interactions with their patients rather than a visit in the clinic.   We also offer nurse visits for many medical concerns. Just like any other service, we will bill your insurance company for this type of visit based on time spent on the phone with your provider. Not all insurance companies cover these visits. Please check with your medical insurance if this type of visit is covered. You will be responsible for any charges that are not paid by your insurance.      E-visits via  TRANSFER - OUT REPORT:    Verbal report given to [facility] RN on Kevan Yepez  being transferred to ICU 11 for routine post - op       Report consisted of patients Situation, Background, Assessment and   Recommendations(SBAR). Information from the following report(s) SBAR was reviewed with the receiving nurse. Lines:   Peripheral IV 03/02/23 Posterior;Right Forearm (Active)   Site Assessment Clean, dry, & intact 03/02/23 1010   Phlebitis Assessment 0 03/02/23 1010   Infiltration Assessment 0 03/02/23 1010   Dressing Status Clean, dry, & intact 03/02/23 1010   Dressing Type Tape;Transparent 03/02/23 1010   Hub Color/Line Status Green;Capped 03/02/23 1010   Action Taken Open ports on tubing capped 03/02/23 1010   Alcohol Cap Used Yes 03/02/23 1010       Peripheral IV 03/02/23 Distal;Left;Posterior Forearm (Active)   Site Assessment Clean, dry, & intact 03/02/23 1010   Phlebitis Assessment 0 03/02/23 1010   Infiltration Assessment 0 03/02/23 1010   Dressing Status Clean, dry, & intact 03/02/23 1010   Dressing Type Tape;Transparent 03/02/23 1010   Hub Color/Line Status Green; Infusing 03/02/23 1010   Action Taken Open ports on tubing capped 03/02/23 1010   Alcohol Cap Used Yes 03/02/23 1010        Opportunity for questions and clarification was provided.       Patient transported with:   Registered Nurse [employer]:  generally incur a $35.00 fee.  Telephone visits:  Time spent on the phone: *charged based on time that is spent on the phone in increments of 10 minutes. Estimated cost:   5-10 mins $30.00   11-20 mins. $59.00   21-30 mins. $85.00   Use [employer] (secure email communication and access to your chart) to send your primary care provider a message or make an appointment. Ask someone on your Team how to sign up for [employer].    For a Price Quote for your services, please call our [employer] Line at [phone].    As always, Thank you for trusting us with your health care needs!    Discharge by PRASHANT COREA             Follow-ups after your visit        Follow-up notes from your care team     Return in about 6 months (around 4/30/2018) for Diabetes.      Your next 10 appointments already scheduled     Nov 03, 2017  8:15 AM CDT   MR CARDIAC W CONTRAST AND STRESS with UUMR4, UU CV MR NURSE   [facility] [place] ([facility])    [address] [facility] [address] [phone]           Take your medicines as usual, unless your doctor tells you not to.   If you take Viagra, Levitra or Cialis, stop taking it 48 hours before your test.   If you take Aggrenox or dipyridamole (Persantine, Permole), stop taking it 48 hours before your test.   If you take Theophylline or Aminophylline, stop taking it 12 hours before your test.   If you are diabetic and take oral hypoglycemics, do not take them on the day of your test.  The day before your exam, drink extra fluids at least six 8-ounce glasses (unless your doctor wants you to limit your fluids).  Stop all caffeine 12 hours before the test. This includes coffee, tea, soda, chocolate and certain medicines (such as Anacin, Excedrin and NoDoz). Also avoid decaf coffee and tea, as these contain small amounts of caffeine.  Do not eat or drink for 3 hours before your exam. You may drink water and take your morning  medicines.  You may need a blood test (creatinine test) within 30 days of your exam. Follow your doctor s orders if this is arranged before your exam.  If you are very claustrophobic, please let you doctor know. You may get a sedative medicine from your doctor before you arrive. Bring the medicine to the exam. Do not take it at home. Arrive one hour early. Bring someone who can take you home after the test. Your medicine will make you sleepy. After the exam, you may not drive, take a bus or take a taxi by yourself.  The MRI machine uses a strong magnet. Please wear clothes without metal (snaps, zippers). A sweatsuit works well, or we may give you a hospital gown.  Please remove any body piercings and hair extensions before you arrive. You will remove watches, jewelry, hairpins, wallets, dentures, partial dental plates and hearing aids. You may wear contact lenses, and you may be able to wear your rings. We have a safe place to keep your personal items, but it is safer to leave them at home.   **IMPORTANT** THE INSTRUCTIONS BELOW ARE ONLY FOR THOSE PATIENTS WHO HAVE BEEN TOLD THEY WILL RECEIVE SEDATION OR GENERAL ANESTHESIA DURING THEIR MRI PROCEDURE:  IF YOU WILL RECEIVE SEDATION (take medicine to help you relax during your exam):   You must get the medicine from your doctor before you arrive. Bring the medicine to the exam. Do not take it at home.   Arrive one hour early. Bring someone who can take you home after the test. Your medicine will make you sleepy. After the exam, you may not drive, take a bus or take a taxi by yourself.   No eating 8 hours before your exam. You may have clear liquids up until 4 hours before your exam. (Clear liquids include water, clear tea, black coffee and fruit juice without pulp.)  IF YOU WILL RECEIVE ANESTHESIA (be asleep for your exam):   Arrive 1 1/2 hours early. Bring someone who can take you home after the test. You may not drive, take a bus or take a taxi by yourself.   No  eating 8 hours before your exam. You may have clear liquids up until 4 hours before your exam. (Clear liquids include water, clear tea, black coffee and fruit juice without pulp.)  If you have any questions, please contact your Imaging Department exam site.            Nov 22, 2017  2:30 PM CST   CONSULT with Octavia Solis MD   [facility]    [address] [id number] [phone]            Dec 11, 2017  4:20 PM CST   Return Visit with Ira Pierre MD   [facility] AT [facility])    [address]  [facility] [address] [phone]              Who to contact     If you have questions or need follow up information about today's clinic visit or your schedule please contact [facility] directly at [phone].  Normal or non-critical lab and imaging results will be communicated to you by [employer], letter or phone within 4 business days after the clinic has received the results. If you do not hear from us within 7 days, please contact the clinic through [employer] or phone. If you have a critical or abnormal lab result, we will notify you by phone as soon as possible.  Submit refill requests through [employer] or call your pharmacy and they will forward the refill request to us. Please allow 3 business days for your refill to be completed.          Additional Information About Your Visit        [employer] Information     [employer] gives you secure access to your electronic health record. If you see a primary care provider, you can also send messages to your care team and make appointments. If you have questions, please call your primary care clinic.  If you do not have a primary care provider, please call [phone] and they will assist you.        Care EveryWhere ID     This is your Care EveryWhere ID. This could be used by other organizations to access your [facility]  medical records  [id number]        Your Vitals Were     Pulse Temperature Last Period Pulse Oximetry BMI (Body Mass Index)       76 96.5  F (35.8  C) (Oral) 02/01/2010 97% 47.06 kg/m2        Blood Pressure from Last 3 Encounters:   10/31/17 120/78   10/23/17 (!) 120/5   10/05/17 130/72    Weight from Last 3 Encounters:   10/31/17 285 lb (129.3 kg)   09/20/17 286 lb (129.7 kg)   09/08/17 285 lb (129.3 kg)              Today, you had the following     No orders found for display         Today's Medication Changes          These changes are accurate as of: 10/31/17  2:13 PM.  If you have any questions, ask your nurse or doctor.               Start taking these medicines.        Dose/Directions    amoxicillin 500 MG capsule   Commonly known as:  AMOXIL   Used for:  Recurrent acute suppurative otitis media without spontaneous rupture of left tympanic membrane        Dose:  500 mg   Take 1 capsule (500 mg) by mouth 3 times daily for 7 days   Quantity:  21 capsule   Refills:  0            Where to get your medicines      These medications were sent to [facility]/pharmacy #[id number] [place] [address]     Phone:  [phone]     amoxicillin 500 MG capsule                Primary Care Provider Office Phone # Fax #    Vania Ramsey Oneil Almendarez, APRN [facility] [phone]       [address] [address]        Equal Access to Services     OSCAR URBANO AH: Luis Alfredo gonsalezo Sodanny, waaxda luqadaha, qaybta kaalmada adeegyada, waxjanie thierry rosas adeseamus mata. So [facility] [phone].    ATENCIÓN: Si habla español, tiene a eason disposición servicios gratuitos de asistencia lingüística. Llame al [phone].    We comply with applicable federal civil rights laws and [place] laws. We do not discriminate on the basis of race, color, national origin, age, disability, sex, sexual orientation, or gender identity.            Thank you!     Thank you for  choosing [facility]  for your care. Our goal is always to provide you with excellent care. Hearing back from our patients is one way we can continue to improve our services. Please take a few minutes to complete the written survey that you may receive in the mail after your visit with us. Thank you!             Your Updated Medication List - Protect others around you: Learn how to safely use, store and throw away your medicines at www.disposemymeds.org.          This list is accurate as of: 10/31/17  2:13 PM.  Always use your most recent med list.                   Brand Name Dispense Instructions for use Diagnosis    AMBIEN 10 MG tablet   Generic drug:  zolpidem      1 TABLET DAILY AT BEDTIME        amoxicillin 500 MG capsule    AMOXIL    21 capsule    Take 1 capsule (500 mg) by mouth 3 times daily for 7 days    Recurrent acute suppurative otitis media without spontaneous rupture of left tympanic membrane       ASPIRIN NOT PRESCRIBED    INTENTIONAL    0 each    Please choose reason not prescribed, below    Paroxysmal atrial fibrillation (H)       blood glucose monitoring lancets     1 Box    TEST ONCE DAILY OR AS NEEDED *DISPENSE WHATEVER IS COVERED BY INS*    Type 2 diabetes mellitus without complication, without long-term current use of insulin (H)       blood glucose monitoring meter device kit    no brand specified    1 kit    Use to test blood sugar one times daily or as directed.    Type 2 diabetes mellitus without complication, without long-term current use of insulin (H)       blood glucose monitoring test strip    no brand specified    100 strip    Use to test blood sugars one times daily or as directed    Type 2 diabetes mellitus without complication, without long-term current use of insulin (H)       BUSPAR PO      Take 30 mg by mouth 2 times daily        BYDUREON 2 MG pen   Generic drug:  exenatide ER      Inject 2 mg Subcutaneous every 7 days        calcium carbonate 1250 (500 CA) MG Tabs  tablet    OSCAL 500    270 tablet    TAKE 1 TABLET BY MOUTH. THREE TIMES DAILY.    Elevated PTHrP level (H)       CVS SPECTRAVITE ULTRA WOMEN Tabs     90 tablet    TAKE 1 TABLET BY MOUTH DAILY    Constipation, chronic       cyanocobalamin 1000 MCG Tabs    CVS vitamin  B12    90 tablet    TAKE 1 TABLET (1,000 MCG) BY MOUTH DAILY    Vitamin B12 deficiency (non anemic)       dabigatran ANTICOAGULANT 150 MG capsule    PRADAXA    60 capsule    Take 1 capsule (150 mg) by mouth twice daily. Store in original 's bottle or blister pack; use within 120 days of opening.    Paroxysmal atrial fibrillation (H)       docusate sodium 100 MG capsule    COLACE    180 capsule    TAKE 1 CAPSULE (100 MG) BY MOUTH 2 TIMES DAILY    Constipation, unspecified constipation type       escitalopram 20 MG tablet    LEXAPRO    90 tablet    Take 1 tablet (20 mg) by mouth daily    Paroxysmal atrial fibrillation (H)       Ferrous Sulfate 143 (45 FE) MG Tbcr    CVS SLOW RELEASE IRON    180 tablet    Take 1 tablet by mouth 2 times daily    Iron deficiency       furosemide 40 MG tablet    LASIX    90 tablet    TAKE 1 TABLET (40 MG) BY MOUTH DAILY    Pedal edema       insulin pen needle 31G X 5 MM    B-D U/F    100 each    Use once daily or as directed.    Type 2 diabetes mellitus without complication (H)       KLOR-CON 20 MEQ CR tablet   Generic drug:  potassium chloride SA     180 tablet    TAKE 1 TABLET (20 MEQ) BY MOUTH 2 TIMES DAILY    Diuresis       levothyroxine 75 MCG tablet    SYNTHROID/LEVOTHROID    60 tablet    TAKE 1 TABLET (75 MG) BY MOUTH EVERY DAY    Acquired hypothyroidism, Vitamin D deficiency, Hyperparathyroidism (H), Abdominal pain, epigastric       lisinopril 10 MG tablet    PRINIVIL/ZESTRIL    90 tablet    TAKE 1 TABLET (10 MG) BY MOUTH DAILY    Type 2 diabetes mellitus without complication, without long-term current use of insulin (H), Essential hypertension with goal blood pressure less than 140/90       LORazepam 1  MG tablet    ATIVAN     Take 1 mg by mouth At Bedtime        metoprolol 100 MG 24 hr tablet    TOPROL-XL    90 tablet    Take 1 tablet (100 mg) by mouth daily    Paroxysmal atrial fibrillation (H), Chest pain, unspecified type       omeprazole 20 MG CR capsule    priLOSEC    180 capsule    Take 1 capsule (20 mg) by mouth 2 times daily    Hiatal hernia       order for DME     1 Box    Test strips for pt's glucometer, brand as covered by insurance. Test twice daily or PRN.    Type 2 diabetes mellitus without complication (H)       PROVIGIL 200 MG tablet   Generic drug:  modafinil      Take 0.5 tablets by mouth daily.        ranitidine 150 MG tablet    ZANTAC    60 tablet    TAKE 1 TABLET (150 MG) BY MOUTH 2 TIMES DAILY    Gastroesophageal reflux disease, esophagitis presence not specified       simvastatin 20 MG tablet    ZOCOR    90 tablet    TAKE 1 TABLET (20 MG) BY MOUTH AT BEDTIME NEED LABS    Hyperlipidemia LDL goal <100       vitamin D 51459 UNIT capsule    ERGOCALCIFEROL    8 capsule    TAKE 1 CAPSULE BY MOUTH ONCE WEEKLY.    Vitamin D deficiency, Hyperparathyroidism (H), Acquired hypothyroidism, Abdominal pain, epigastric       Vitamin D3 3000 UNITS Tabs

## 2023-06-25 ENCOUNTER — HEALTH MAINTENANCE LETTER (OUTPATIENT)
Age: 63
End: 2023-06-25

## 2023-09-03 ENCOUNTER — HEALTH MAINTENANCE LETTER (OUTPATIENT)
Age: 63
End: 2023-09-03

## 2023-09-12 ENCOUNTER — IMMUNIZATION (OUTPATIENT)
Dept: FAMILY MEDICINE | Facility: CLINIC | Age: 63
End: 2023-09-12
Payer: COMMERCIAL

## 2023-09-12 DIAGNOSIS — Z23 NEED FOR PROPHYLACTIC VACCINATION AND INOCULATION AGAINST INFLUENZA: Primary | ICD-10-CM

## 2023-09-12 PROCEDURE — 90471 IMMUNIZATION ADMIN: CPT

## 2023-09-12 PROCEDURE — 99207 PR NO CHARGE NURSE ONLY: CPT

## 2023-09-12 PROCEDURE — 90682 RIV4 VACC RECOMBINANT DNA IM: CPT

## 2024-03-31 ENCOUNTER — HEALTH MAINTENANCE LETTER (OUTPATIENT)
Age: 64
End: 2024-03-31

## 2024-08-04 NOTE — TELEPHONE ENCOUNTER
Prescription approved per [facility] Refill Protocol.    Signed Prescriptions:                        Disp   Refills    docusate sodium (COLACE) 100 MG capsule    180 ca*2        Sig: TAKE 1 CAPSULE (100 MG) BY MOUTH 2 TIMES DAILY  Authorizing Provider: NAKUL MONK  Ordering User: RAFAEL MARTINEZ    lisinopril (PRINIVIL/ZESTRIL) 10 MG tablet 90 tab*2        Sig: TAKE 1 TABLET (10 MG) BY MOUTH DAILY  Authorizing Provider: NAKUL MONK  Ordering User: RAFAEL MARTINEZ RN    
lisinopril (PRINIVIL/ZESTRIL) 10 MG tablet      Last Written Prescription Date: 4/26/2017  Last Fill Quantity: 90, # refills: 1  Last Office Visit with [facility], [facility] or  Health prescribing provider: 9/20/2017  Next 5 appointments (look out 90 days)     Nov 14, 2017  9:30 AM CST   Return Visit with Marium Mi MD   [facility] ([facility])    [address] [place] [id number]   [phone]                   Potassium   Date Value Ref Range Status   08/18/2017 4.4 3.4 - 5.3 mmol/L Final     Creatinine   Date Value Ref Range Status   08/18/2017 0.90 0.52 - 1.04 mg/dL Final     BP Readings from Last 3 Encounters:   10/05/17 130/72   10/04/17 127/84   09/20/17 118/80     docusate sodium (COLACE) 100 MG capsule      Last Written Prescription Date: 11/10/2016  Last Fill Quantity: 180,  # refills: 1   Last Office Visit with [facility], [facility] or  Health prescribing provider: 9/20/2017                                         Next 5 appointments (look out 90 days)     Nov 14, 2017  9:30 AM CST   Return Visit with Marium Mi MD   [facility] AT [facility] ([facility])    [address] [place] [id number]   [phone]                      
No

## 2024-08-18 ENCOUNTER — HEALTH MAINTENANCE LETTER (OUTPATIENT)
Age: 64
End: 2024-08-18

## 2024-10-27 ENCOUNTER — HEALTH MAINTENANCE LETTER (OUTPATIENT)
Age: 64
End: 2024-10-27

## 2025-03-01 ENCOUNTER — HEALTH MAINTENANCE LETTER (OUTPATIENT)
Age: 65
End: 2025-03-01

## 2025-05-04 ENCOUNTER — HEALTH MAINTENANCE LETTER (OUTPATIENT)
Age: 65
End: 2025-05-04

## (undated) RX ORDER — AMINOPHYLLINE 25 MG/ML
INJECTION, SOLUTION INTRAVENOUS
Status: DISPENSED
Start: 2017-11-03

## (undated) RX ORDER — REGADENOSON 0.08 MG/ML
INJECTION, SOLUTION INTRAVENOUS
Status: DISPENSED
Start: 2017-11-03